# Patient Record
Sex: FEMALE | Race: WHITE | NOT HISPANIC OR LATINO
[De-identification: names, ages, dates, MRNs, and addresses within clinical notes are randomized per-mention and may not be internally consistent; named-entity substitution may affect disease eponyms.]

---

## 2021-11-11 ENCOUNTER — NON-APPOINTMENT (OUTPATIENT)
Age: 51
End: 2021-11-11

## 2021-11-15 ENCOUNTER — APPOINTMENT (OUTPATIENT)
Dept: BREAST CENTER | Facility: CLINIC | Age: 51
End: 2021-11-15
Payer: MEDICAID

## 2021-11-15 ENCOUNTER — NON-APPOINTMENT (OUTPATIENT)
Age: 51
End: 2021-11-15

## 2021-11-15 DIAGNOSIS — Z86.59 PERSONAL HISTORY OF OTHER MENTAL AND BEHAVIORAL DISORDERS: ICD-10-CM

## 2021-11-15 DIAGNOSIS — Z83.3 FAMILY HISTORY OF DIABETES MELLITUS: ICD-10-CM

## 2021-11-15 DIAGNOSIS — Z78.9 OTHER SPECIFIED HEALTH STATUS: ICD-10-CM

## 2021-11-15 DIAGNOSIS — Z82.49 FAMILY HISTORY OF ISCHEMIC HEART DISEASE AND OTHER DISEASES OF THE CIRCULATORY SYSTEM: ICD-10-CM

## 2021-11-15 PROCEDURE — 99203 OFFICE O/P NEW LOW 30 MIN: CPT

## 2021-11-15 RX ORDER — ALPRAZOLAM 0.5 MG/1
0.5 TABLET ORAL
Qty: 30 | Refills: 0 | Status: ACTIVE | COMMUNITY
Start: 2021-10-15

## 2021-11-15 NOTE — PHYSICAL EXAM
[Normocephalic] : normocephalic [Atraumatic] : atraumatic [Supple] : supple [No Supraclavicular Adenopathy] : no supraclavicular adenopathy [Examined in the supine and seated position] : examined in the supine and seated position [Bra Size: ___] : Bra Size: [unfilled] [No dominant masses] : no dominant masses in right breast  [No Nipple Retraction] : no left nipple retraction [No Nipple Discharge] : no left nipple discharge [No Axillary Lymphadenopathy] : no right axillary lymphadenopathy [No Edema] : no edema [No Rashes] : no rashes [No Ulceration] : no ulceration [de-identified] : bilateral implants intact. left axillary tail with 4cm soft vague mass. left axilla with 2cm soft palpable node

## 2021-11-15 NOTE — DATA REVIEWED
[FreeTextEntry1] : 11/5/21 (R) b/l dx mmg and US: heterogeneously dense. Corresponding to the patient's palpable finding is a 3.9 cm mass in the left breast 1-2 o'clock which is suspicious for malignancy. This would correspond to the area of architectural distortion seen on mammography. Recommend ultrasound-guided core biopsy and clip placement. BI-RADS 4.\par \par 11/9/21 (R/[facility] path) US bx:1. Left breast 1:00-2:00 5cm fn, core biopsy:  Invasive moderately differentiated mammary carcinoma with associated calcifications - Focal mammary carcinoma in situ. IHC: ESTROGEN RECEPTOR: POSITIVE 45% NUCLEAR STAINING WITH WEAK/MODERATE 1+/2+ INTENSITY PROGESTERONE RECEPTOR: POSITIVE 30% NUCLEAR STAINING WITH MODERATE 2+ INTENSITY HER-2: NEGATIVE (0 MEMBRANOUS STAINING) \par

## 2021-11-15 NOTE — PAST MEDICAL HISTORY
[Menstruating] : The patient is menstruating [Menarche Age ____] : age at menarche was [unfilled] [Definite ___ (Date)] : the last menstrual period was [unfilled] [Total Preg ___] : G[unfilled] [Living ___] : Living: [unfilled] [History of Hormone Replacement Treatment] : has no history of hormone replacement treatment [FreeTextEntry5] : No [FreeTextEntry6] : No [FreeTextEntry7] : No [FreeTextEntry8] : No

## 2021-11-15 NOTE — HISTORY OF PRESENT ILLNESS
[FreeTextEntry1] : 52 yo female referred by Dr. Figueroa at [facility] presents for newly diagnosed left breast invasive mammary carcinoma s/ focal mammary carcinoma in situ (ER+ UT+ HER2 negative) found on breast US as a 3.9cm mass that was felt on palpation by the patient; she states that the mass has been present for many years. Patient's last mammogram was in 2013, due to multiple personal issues. Denies nipple discharge, skin changes, fevers, chills. \par \par Of note the patient has b/l implants that were placed >10 years ago\par \par Discussed patient's new diagnosis of cancer. Discussed local therapy which would include breast and axillary surgery, depending on results of MRI. Discussed the possibility of radiation therapy if breast conservation is pursued. Discussed option for mastectomy and contralateral prophylactic mastectomy depending on MRI and genetic testing results. Discussed need for plastic surgery consult in regards to the implant. Also discussed systemic therapy including endocrine therapy as her tumor is ER+, and chemotherapy depending on the Ki67 (which is pending), final pathology and medical oncology recommendations. Patient expressed understanding and agrees with the plan.\par \par Discussed importance and implication of genetic testing in regards to her new cancer diagnosis and pre op planning. Patient would like to go forward with genetic testing.\par \par Discussed benefits of surveillance and well as implication of the sensitivity of breast MRI. Patient understands and agrees to go forward with MRI for pre op planning.

## 2021-11-23 DIAGNOSIS — R92.8 OTHER ABNORMAL AND INCONCLUSIVE FINDINGS ON DIAGNOSTIC IMAGING OF BREAST: ICD-10-CM

## 2021-11-29 ENCOUNTER — APPOINTMENT (OUTPATIENT)
Dept: BREAST CENTER | Facility: CLINIC | Age: 51
End: 2021-11-29
Payer: MEDICAID

## 2021-11-29 PROCEDURE — 99213 OFFICE O/P EST LOW 20 MIN: CPT | Mod: 95

## 2021-12-01 NOTE — DATA REVIEWED
[FreeTextEntry1] : 11/5/21 ([facility]) b/l dx mmg and US: heterogeneously dense. Corresponding to the patient's palpable finding is a 3.9 cm mass in the left breast 1-2 o'clock which is suspicious for malignancy. This would correspond to the area of architectural distortion seen on mammography. Recommend ultrasound-guided core biopsy and clip placement. BI-RADS 4.\par \par 11/9/21 (R/[facility] path) US bx:1. Left breast 1:00-2:00 5cm fn, core biopsy:  Invasive moderately differentiated mammary carcinoma with associated calcifications - Focal mammary carcinoma in situ. IHC: ESTROGEN RECEPTOR: POSITIVE 45% NUCLEAR STAINING WITH WEAK/MODERATE 1+/2+ INTENSITY PROGESTERONE RECEPTOR: POSITIVE 30% NUCLEAR STAINING WITH MODERATE 2+ INTENSITY HER-2: NEGATIVE (0 MEMBRANOUS STAINING) \par \par 11/23/21 ([facility]) MRI: 1. MR guided core biopsy or clip placement for the right 10:00 axis mass enhancement. 2. Appropriate action for the large left breast cancer.  3. Targeted left axillary ultrasound and possible ultrasound core biopsy based on a palpable abnormality in the left axilla by the referring clinician, which is likely not included on this study. BI-RADS 4. \par

## 2021-12-01 NOTE — REASON FOR VISIT
[Consultation] : a consultation visit [Home] : at home, [unfilled] , at the time of the visit. [Medical Office: ([facility])___] : at the medical office located in  [Verbal consent obtained from patient] : the patient, [unfilled] [Friend] : friend

## 2021-12-01 NOTE — HISTORY OF PRESENT ILLNESS
[FreeTextEntry1] : 52 yo female referred by Dr. Figueroa at [facility] presents for newly diagnosed left breast invasive mammary carcinoma s/ focal mammary carcinoma in situ (ER+ OH+ HER2 negative) found on breast US as a 3.9cm mass that was felt on palpation by the patient; she states that the mass has been present for many years. Patient's last mammogram was in 2013, due to multiple personal issues. Denies nipple discharge, skin changes, fevers, chills. \par \par Of note the patient has b/l implants that were placed >10 years ago\par \par Discussed patient's MRI results which show suspicious enhancement on right breast which is too close to the implant for a biopsy, therefore will have clip placed and will excise with the cancer surgery. Discussed recommendation for mastectomy due to the size of the mass on MRI. Discussed recommendation to meet with medical oncology due to the Ki67% of 35%. Discussed the possibility of systemic therapy prior to surgery. Discussed recommendation for plastic surgery consult. Patient and friend understood and agree with plan.

## 2021-12-06 ENCOUNTER — APPOINTMENT (OUTPATIENT)
Dept: PLASTIC SURGERY | Facility: CLINIC | Age: 51
End: 2021-12-06
Payer: MEDICAID

## 2021-12-06 VITALS — HEIGHT: 71.5 IN | WEIGHT: 136 LBS | BODY MASS INDEX: 18.62 KG/M2 | HEART RATE: 85 BPM | OXYGEN SATURATION: 98 %

## 2021-12-06 PROCEDURE — 99204 OFFICE O/P NEW MOD 45 MIN: CPT

## 2021-12-07 NOTE — HISTORY OF PRESENT ILLNESS
[FreeTextEntry1] : 52 y/o female newly diagnosed left breast invasive carcinoma referred by Dr. Elise presents for initial consultation to discuss options for breast reconstruction. She is unsure if she will be having mastectomy vs lumpectomy. She had a cosmetic breast augmentation with saline implants in 2003. She is currently a 31 D. \par Breasts: b/l retropectoral implants in place, well healed periareolar incisions, no nipple inversion, no nipple discharge. BD: 14-15 cm, projection: 4 cm \par Abdomen: inadequate tissue for autologous reconstruction\par \par Today we discussed all options for breast reconstruction including oncoplastic reconstruction for lumpectomy vs breast reconstruction with tissue expanders vs direct to implant exchange. The nature of each surgery, its risks, benefits, alternatives, expected postoperative course, recovery and long term results were discussed in detail. All questions were answered.\par Will coordinate surgery date with Dr. Elise.\par

## 2021-12-14 ENCOUNTER — LABORATORY RESULT (OUTPATIENT)
Age: 51
End: 2021-12-14

## 2021-12-14 ENCOUNTER — TRANSCRIPTION ENCOUNTER (OUTPATIENT)
Age: 51
End: 2021-12-14

## 2021-12-15 ENCOUNTER — NON-APPOINTMENT (OUTPATIENT)
Age: 51
End: 2021-12-15

## 2021-12-15 ENCOUNTER — APPOINTMENT (OUTPATIENT)
Dept: HEMATOLOGY ONCOLOGY | Facility: CLINIC | Age: 51
End: 2021-12-15
Payer: MEDICAID

## 2021-12-15 VITALS
DIASTOLIC BLOOD PRESSURE: 88 MMHG | WEIGHT: 134 LBS | BODY MASS INDEX: 18.76 KG/M2 | SYSTOLIC BLOOD PRESSURE: 136 MMHG | HEART RATE: 100 BPM | TEMPERATURE: 98 F | OXYGEN SATURATION: 97 % | HEIGHT: 71 IN | RESPIRATION RATE: 18 BRPM

## 2021-12-15 DIAGNOSIS — Z78.9 OTHER SPECIFIED HEALTH STATUS: ICD-10-CM

## 2021-12-15 DIAGNOSIS — R59.9 ENLARGED LYMPH NODES, UNSPECIFIED: ICD-10-CM

## 2021-12-15 PROCEDURE — 99205 OFFICE O/P NEW HI 60 MIN: CPT

## 2021-12-16 LAB
25(OH)D3 SERPL-MCNC: 28.2 NG/ML
BASOPHILS # BLD AUTO: 0.05 K/UL
BASOPHILS NFR BLD AUTO: 0.9 %
CANCER AG27-29 SERPL-ACNC: 22 U/ML
CEA SERPL-MCNC: 1.2 NG/ML
EOSINOPHIL # BLD AUTO: 0.08 K/UL
EOSINOPHIL NFR BLD AUTO: 1.4 %
ESTRADIOL SERPL-MCNC: 216 PG/ML
FSH SERPL-MCNC: 9.4 IU/L
HCT VFR BLD CALC: 45.7 %
HGB BLD-MCNC: 14.7 G/DL
IMM GRANULOCYTES NFR BLD AUTO: 0.4 %
LH SERPL-ACNC: 18 IU/L
LYMPHOCYTES # BLD AUTO: 0.61 K/UL
LYMPHOCYTES NFR BLD AUTO: 10.8 %
MAN DIFF?: NORMAL
MCHC RBC-ENTMCNC: 32.2 GM/DL
MCHC RBC-ENTMCNC: 33.3 PG
MCV RBC AUTO: 103.6 FL
MONOCYTES # BLD AUTO: 0.72 K/UL
MONOCYTES NFR BLD AUTO: 12.7 %
NEUTROPHILS # BLD AUTO: 4.18 K/UL
NEUTROPHILS NFR BLD AUTO: 73.8 %
PLATELET # BLD AUTO: 279 K/UL
RBC # BLD: 4.41 M/UL
RBC # FLD: 12.6 %
VIT B12 SERPL-MCNC: 619 PG/ML
WBC # FLD AUTO: 5.66 K/UL

## 2021-12-20 ENCOUNTER — TRANSCRIPTION ENCOUNTER (OUTPATIENT)
Age: 51
End: 2021-12-20

## 2021-12-29 PROBLEM — R59.9 PALPABLE LYMPH NODE: Status: ACTIVE | Noted: 2021-11-15

## 2021-12-29 PROBLEM — Z78.9: Status: ACTIVE | Noted: 2021-12-15
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
## 2021-12-29 NOTE — ASSESSMENT
[FreeTextEntry1] : 50 yo premenopausal Polish woman ( fluent in English and Polish) referred by Dr. Elise for evaluation of left breast cancer.   Biopsy revealed IDC ESTROGEN RECEPTOR: POSITIVE 45% NUCLEAR STAINING WITH WEAK/MODERATE 1+/2+ INTENSITY PROGESTERONE RECEPTOR: POSITIVE 30% NUCLEAR STAINING WITH MODERATE 2+ INTENSITY HER-2: NEGATIVE (0 MEMBRANOUS STAINING) KI 67 35 %.\par \par Imaging studies with left breast mass in US 3.9 x 1.1 x 1.2 cm, MRI max dimension 5.3 cm.\par Right breast lesion - biopsy unsuccessful.\par \par Patient decided on bilateral mastectomy.  She understand indication for systemic chemotherapy based on lymph node status and molecular testing. 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 Reviewed protocols of chemotherapy including ACdd> Tweekly, TC and CMF with the last one with lowest incidence of hair loss.\par Reviewed options for scalp cooling to decrease hair loss. \par Reviewed with patient indication for radiation therapy if tumor over 5 cm or LN positive.\par Plan for endocrine treatment with or without CDK 4/6 based on the final pathology.\par \par

## 2021-12-29 NOTE — PHYSICAL EXAM
[Fully active, able to carry on all pre-disease performance without restriction] : Status 0 - Fully active, able to carry on all pre-disease performance without restriction [Normal] : affect appropriate [de-identified] : b/l retropec implants, soft left axilla LN

## 2022-01-06 ENCOUNTER — TRANSCRIPTION ENCOUNTER (OUTPATIENT)
Age: 52
End: 2022-01-06

## 2022-01-06 ENCOUNTER — APPOINTMENT (OUTPATIENT)
Dept: PLASTIC SURGERY | Facility: CLINIC | Age: 52
End: 2022-01-06
Payer: MEDICAID

## 2022-01-06 PROCEDURE — 99442: CPT

## 2022-01-06 NOTE — HISTORY OF PRESENT ILLNESS
[Home] : at home, [unfilled] , at the time of the visit. [Medical Office: ([facility])___] : at the medical office located in  [Verbal consent obtained from patient] : the patient, [unfilled] [FreeTextEntry1] : Patient Name: MARY REYES \par : 1970 \par Date: 2022 \par Attending: Dr. Jose R Barry\par \par HPI: preop consultation for bilateral tissue expander breast reconstruction 21.\par \par Allergies: NKDA\par Medication prescribed: valium 5 mg, percocet 5-325 mg\par \par ROS: complete 14 point review of systems negative except pertinent items reviewed in the HPI. Other non-contributory items reviewed in our new patient questionnaire and we have submitted it to be scanned into the medical record. \par \par Physical Exam: \par completed at time of in office evaluation \par \par Assessment/Plan:\par We have discussed pre and postop instructions, recovery limitations, restrictions and expectations, ERAS protocol, NPO status, transportation home, postop medications, COVID-19 policies, benefits and risks of the procedure. Pre-op labs reviewed. The patient would like to proceed with surgery as scheduled.\par \par JERO MARCOS NP\par \par

## 2022-01-10 ENCOUNTER — LABORATORY RESULT (OUTPATIENT)
Age: 52
End: 2022-01-10

## 2022-01-12 ENCOUNTER — NON-APPOINTMENT (OUTPATIENT)
Age: 52
End: 2022-01-12

## 2022-01-19 ENCOUNTER — APPOINTMENT (OUTPATIENT)
Dept: HEMATOLOGY ONCOLOGY | Facility: CLINIC | Age: 52
End: 2022-01-19
Payer: MEDICAID

## 2022-01-26 ENCOUNTER — LABORATORY RESULT (OUTPATIENT)
Age: 52
End: 2022-01-26

## 2022-01-26 ENCOUNTER — APPOINTMENT (OUTPATIENT)
Dept: HEMATOLOGY ONCOLOGY | Facility: CLINIC | Age: 52
End: 2022-01-26
Payer: MEDICAID

## 2022-01-26 ENCOUNTER — OUTPATIENT (OUTPATIENT)
Dept: OUTPATIENT SERVICES | Facility: HOSPITAL | Age: 52
LOS: 1 days | End: 2022-01-26
Payer: COMMERCIAL

## 2022-01-26 ENCOUNTER — APPOINTMENT (OUTPATIENT)
Age: 52
End: 2022-01-26

## 2022-01-26 VITALS
BODY MASS INDEX: 19.04 KG/M2 | TEMPERATURE: 97.7 F | HEART RATE: 77 BPM | DIASTOLIC BLOOD PRESSURE: 79 MMHG | SYSTOLIC BLOOD PRESSURE: 130 MMHG | WEIGHT: 136 LBS | RESPIRATION RATE: 18 BRPM | HEIGHT: 71 IN | OXYGEN SATURATION: 100 %

## 2022-01-26 DIAGNOSIS — C50.919 MALIGNANT NEOPLASM OF UNSPECIFIED SITE OF UNSPECIFIED FEMALE BREAST: ICD-10-CM

## 2022-01-26 PROCEDURE — 99214 OFFICE O/P EST MOD 30 MIN: CPT | Mod: 25

## 2022-01-26 PROCEDURE — 96372 THER/PROPH/DIAG INJ SC/IM: CPT

## 2022-01-26 RX ORDER — BUPROPION HYDROCHLORIDE 200 MG/1
200 TABLET, FILM COATED, EXTENDED RELEASE ORAL
Qty: 30 | Refills: 0 | Status: DISCONTINUED | COMMUNITY
Start: 2021-11-01 | End: 2022-01-26

## 2022-01-26 RX ORDER — BUPROPION HYDROCHLORIDE 300 MG/1
300 TABLET, EXTENDED RELEASE ORAL
Qty: 30 | Refills: 0 | Status: DISCONTINUED | COMMUNITY
Start: 2021-11-11 | End: 2022-01-26

## 2022-01-26 RX ORDER — GOSERELIN ACETATE 10.8 MG/1
3.6 IMPLANT SUBCUTANEOUS ONCE
Refills: 0 | Status: COMPLETED | OUTPATIENT
Start: 2022-01-26 | End: 2022-01-26

## 2022-01-26 RX ORDER — BUSPIRONE HYDROCHLORIDE 30 MG/1
30 TABLET ORAL
Qty: 60 | Refills: 0 | Status: DISCONTINUED | COMMUNITY
Start: 2021-11-11 | End: 2022-01-26

## 2022-01-26 RX ADMIN — GOSERELIN ACETATE 3.6 MILLIGRAM(S): 10.8 IMPLANT SUBCUTANEOUS at 16:24

## 2022-01-26 NOTE — ASSESSMENT
[FreeTextEntry1] : 50 yo premenopausal Polish woman ( fluent in English and Polish) referred by Dr. Elise for evaluation of left breast cancer.   Biopsy revealed IDC ESTROGEN RECEPTOR: POSITIVE 45% NUCLEAR STAINING WITH WEAK/MODERATE 1+/2+ INTENSITY PROGESTERONE RECEPTOR: POSITIVE 30% NUCLEAR STAINING WITH MODERATE 2+ INTENSITY HER-2: NEGATIVE (0 MEMBRANOUS STAINING) KI 67 35 %.\par \par Imaging studies with left breast mass in US 3.9 x 1.1 x 1.2 cm, MRI max dimension 5.3 cm.\par Right breast lesion - biopsy unsuccessful.\par \par Patient decided on bilateral mastectomy.  She understand indication for systemic chemotherapy based on lymph node status and molecular testing. 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 Reviewed protocols of chemotherapy including ACdd> Tweekly, TC and CMF with the last one with lowest incidence of hair loss.\par Reviewed options for scalp cooling to decrease hair loss. \par Reviewed with patient indication for radiation therapy if tumor over 5 cm or LN positive.\par Plan for endocrine treatment with or without CDK 4/6 based on the final pathology.\par \par Patient scheduled for bilateral mastectomy next week.\par Mammaprint results c/w low recurrence score, reviewed with patient if LN positive given the fact that she is premenopausal she will have indication for adjuvant chemotherapy. If LN negative plan for AI. \par Zoladex 3.6 mg today, reviewed side effects.
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
## 2022-01-26 NOTE — DISCUSSION/SUMMARY
[FreeTextEntry1] : Pt with slightly elevated LFTS's- she states when she was diagnosed she increased her wine consuption however has stopped last week.  Will obtain abdominal US and repeat labs in 2-3 weeks

## 2022-01-27 LAB
CANCER AG27-29 SERPL-ACNC: 20.8 U/ML
CEA SERPL-MCNC: 0.9 NG/ML
CRP SERPL-MCNC: <3 MG/L
ERYTHROCYTE [SEDIMENTATION RATE] IN BLOOD BY WESTERGREN METHOD: 16 MM/HR
LDH SERPL-CCNC: 209 U/L
MAGNESIUM SERPL-MCNC: 1.9 MG/DL
PHOSPHATE SERPL-MCNC: 3.4 MG/DL
URATE SERPL-MCNC: 4.7 MG/DL

## 2022-01-31 ENCOUNTER — TRANSCRIPTION ENCOUNTER (OUTPATIENT)
Age: 52
End: 2022-01-31

## 2022-01-31 ENCOUNTER — OUTPATIENT (OUTPATIENT)
Dept: OUTPATIENT SERVICES | Facility: HOSPITAL | Age: 52
LOS: 1 days | End: 2022-01-31
Payer: COMMERCIAL

## 2022-01-31 DIAGNOSIS — N88.8 OTHER SPECIFIED NONINFLAMMATORY DISORDERS OF CERVIX UTERI: Chronic | ICD-10-CM

## 2022-01-31 DIAGNOSIS — Z98.82 BREAST IMPLANT STATUS: Chronic | ICD-10-CM

## 2022-01-31 DIAGNOSIS — Z86.018 PERSONAL HISTORY OF OTHER BENIGN NEOPLASM: Chronic | ICD-10-CM

## 2022-01-31 PROCEDURE — 78195 LYMPH SYSTEM IMAGING: CPT

## 2022-01-31 PROCEDURE — 78195 LYMPH SYSTEM IMAGING: CPT | Mod: 26

## 2022-01-31 PROCEDURE — A9541: CPT

## 2022-01-31 NOTE — ASU PATIENT PROFILE, ADULT - FALL HARM RISK - UNIVERSAL INTERVENTIONS
Bed in lowest position, wheels locked, appropriate side rails in place/Call bell, personal items and telephone in reach/Instruct patient to call for assistance before getting out of bed or chair/Non-slip footwear when patient is out of bed/[place] to call system/Physically safe environment - no spills, clutter or unnecessary equipment/Purposeful Proactive Rounding/Room/bathroom lighting operational, light cord in reach

## 2022-01-31 NOTE — ASU PATIENT PROFILE, ADULT - NSICDXPASTSURGICALHX_GEN_ALL_CORE_FT
PAST SURGICAL HISTORY:  Cervical mass removal    H/O breast implant     History of benign breast tumor removal     PAST SURGICAL HISTORY:  Cervical mass removal    H/O breast implant     History of benign breast tumor removal right
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
## 2022-02-01 ENCOUNTER — OUTPATIENT (OUTPATIENT)
Dept: OUTPATIENT SERVICES | Facility: HOSPITAL | Age: 52
LOS: 1 days | Discharge: HOME | End: 2022-02-01
Payer: COMMERCIAL

## 2022-02-01 ENCOUNTER — RESULT REVIEW (OUTPATIENT)
Age: 52
End: 2022-02-01

## 2022-02-01 ENCOUNTER — APPOINTMENT (OUTPATIENT)
Dept: BREAST CENTER | Facility: CLINIC | Age: 52
End: 2022-02-01

## 2022-02-01 VITALS
SYSTOLIC BLOOD PRESSURE: 121 MMHG | HEART RATE: 83 BPM | OXYGEN SATURATION: 98 % | WEIGHT: 137.35 LBS | TEMPERATURE: 98 F | RESPIRATION RATE: 18 BRPM | HEIGHT: 71 IN | DIASTOLIC BLOOD PRESSURE: 81 MMHG

## 2022-02-01 DIAGNOSIS — Z86.018 PERSONAL HISTORY OF OTHER BENIGN NEOPLASM: Chronic | ICD-10-CM

## 2022-02-01 DIAGNOSIS — N88.8 OTHER SPECIFIED NONINFLAMMATORY DISORDERS OF CERVIX UTERI: Chronic | ICD-10-CM

## 2022-02-01 DIAGNOSIS — Z98.82 BREAST IMPLANT STATUS: Chronic | ICD-10-CM

## 2022-02-01 LAB
ANION GAP SERPL CALC-SCNC: 14 MMOL/L — SIGNIFICANT CHANGE UP (ref 5–17)
BASE EXCESS BLDA CALC-SCNC: -2.1 MMOL/L — LOW (ref -2–3)
BASE EXCESS BLDA CALC-SCNC: -3 MMOL/L — LOW (ref -2–3)
BUN SERPL-MCNC: 10 MG/DL — SIGNIFICANT CHANGE UP (ref 7–23)
CA-I BLDA-SCNC: 1.17 MMOL/L — SIGNIFICANT CHANGE UP (ref 1.15–1.33)
CALCIUM SERPL-MCNC: 8.4 MG/DL — SIGNIFICANT CHANGE UP (ref 8.4–10.5)
CHLORIDE SERPL-SCNC: 108 MMOL/L — SIGNIFICANT CHANGE UP (ref 96–108)
CO2 BLDA-SCNC: 22 MMOL/L — SIGNIFICANT CHANGE UP (ref 19–24)
CO2 BLDA-SCNC: 24 MMOL/L — SIGNIFICANT CHANGE UP (ref 19–24)
CO2 SERPL-SCNC: 18 MMOL/L — LOW (ref 22–31)
COHGB MFR BLDA: 0 % — SIGNIFICANT CHANGE UP
CREAT SERPL-MCNC: 0.49 MG/DL — LOW (ref 0.5–1.3)
GLUCOSE BLDA-MCNC: 212 MG/DL — HIGH (ref 70–99)
GLUCOSE BLDC GLUCOMTR-MCNC: 193 MG/DL — HIGH (ref 70–99)
GLUCOSE SERPL-MCNC: 202 MG/DL — HIGH (ref 70–99)
HCO3 BLDA-SCNC: 21 MMOL/L — SIGNIFICANT CHANGE UP (ref 21–28)
HCO3 BLDA-SCNC: 23 MMOL/L — SIGNIFICANT CHANGE UP (ref 21–28)
HCT VFR BLD CALC: 35 % — SIGNIFICANT CHANGE UP (ref 34.5–45)
HGB BLD-MCNC: 11.9 G/DL — SIGNIFICANT CHANGE UP (ref 11.5–15.5)
HGB BLDA-MCNC: 13.3 G/DL — SIGNIFICANT CHANGE UP (ref 11.7–16.1)
ISTAT ARTERIAL BE: -1 MMOL/L — SIGNIFICANT CHANGE UP (ref -2–3)
ISTAT ARTERIAL GLUCOSE: 142 MG/DL — HIGH (ref 70–99)
ISTAT ARTERIAL HCO3: 24 MMOL/L — SIGNIFICANT CHANGE UP (ref 22–26)
ISTAT ARTERIAL HEMATOCRIT: 37 % — SIGNIFICANT CHANGE UP (ref 34.5–45)
ISTAT ARTERIAL HEMOGLOBIN: 12.6 G/DL — SIGNIFICANT CHANGE UP (ref 11.5–15.5)
ISTAT ARTERIAL IONIZED CALCIUM: 1.15 MMOL/L — SIGNIFICANT CHANGE UP (ref 1.12–1.3)
ISTAT ARTERIAL PCO2: 44 MMHG — SIGNIFICANT CHANGE UP (ref 35–45)
ISTAT ARTERIAL PH: 7.35 — SIGNIFICANT CHANGE UP (ref 7.35–7.45)
ISTAT ARTERIAL PO2: 429 MMHG — HIGH (ref 80–105)
ISTAT ARTERIAL POTASSIUM: 2.7 MMOL/L — CRITICAL LOW (ref 3.5–5.3)
ISTAT ARTERIAL SO2: 100 % — HIGH (ref 95–98)
ISTAT ARTERIAL SODIUM: 140 MMOL/L — SIGNIFICANT CHANGE UP (ref 135–145)
ISTAT ARTERIAL TCO2: 26 MMOL/L — SIGNIFICANT CHANGE UP (ref 22–31)
LACTATE SERPL-SCNC: 2.2 MMOL/L — HIGH (ref 0.5–2)
MAGNESIUM SERPL-MCNC: 1.1 MG/DL — LOW (ref 1.6–2.6)
MCHC RBC-ENTMCNC: 32.1 PG — SIGNIFICANT CHANGE UP (ref 27–34)
MCHC RBC-ENTMCNC: 34 GM/DL — SIGNIFICANT CHANGE UP (ref 32–36)
MCV RBC AUTO: 94.3 FL — SIGNIFICANT CHANGE UP (ref 80–100)
METHGB MFR BLDA: 1.2 % — SIGNIFICANT CHANGE UP
NRBC # BLD: 0 /100 WBCS — SIGNIFICANT CHANGE UP (ref 0–0)
OXYHGB MFR BLDA: 96.6 % — HIGH (ref 90–95)
PCO2 BLDA: 29 MMHG — LOW (ref 32–35)
PCO2 BLDA: 42 MMHG — HIGH (ref 32–35)
PH BLDA: 7.34 — LOW (ref 7.35–7.45)
PH BLDA: 7.46 — HIGH (ref 7.35–7.45)
PHOSPHATE SERPL-MCNC: 2.7 MG/DL — SIGNIFICANT CHANGE UP (ref 2.5–4.5)
PLATELET # BLD AUTO: 191 K/UL — SIGNIFICANT CHANGE UP (ref 150–400)
PO2 BLDA: 215 MMHG — HIGH (ref 83–108)
PO2 BLDA: 399 MMHG — HIGH (ref 83–108)
POTASSIUM BLDA-SCNC: 2.9 MMOL/L — CRITICAL LOW (ref 3.5–5.1)
POTASSIUM SERPL-MCNC: 3.6 MMOL/L — SIGNIFICANT CHANGE UP (ref 3.5–5.3)
POTASSIUM SERPL-SCNC: 3.6 MMOL/L — SIGNIFICANT CHANGE UP (ref 3.5–5.3)
RBC # BLD: 3.71 M/UL — LOW (ref 3.8–5.2)
RBC # FLD: 11.7 % — SIGNIFICANT CHANGE UP (ref 10.3–14.5)
SAO2 % BLDA: 97.8 % — SIGNIFICANT CHANGE UP (ref 94–98)
SAO2 % BLDA: 97.9 % — SIGNIFICANT CHANGE UP (ref 94–98)
SODIUM BLDA-SCNC: 137 MMOL/L — SIGNIFICANT CHANGE UP (ref 136–145)
SODIUM SERPL-SCNC: 140 MMOL/L — SIGNIFICANT CHANGE UP (ref 135–145)
WBC # BLD: 15.83 K/UL — HIGH (ref 3.8–10.5)
WBC # FLD AUTO: 15.83 K/UL — HIGH (ref 3.8–10.5)

## 2022-02-01 PROCEDURE — 88305 TISSUE EXAM BY PATHOLOGIST: CPT | Mod: 26

## 2022-02-01 PROCEDURE — 99291 CRITICAL CARE FIRST HOUR: CPT

## 2022-02-01 PROCEDURE — 71045 X-RAY EXAM CHEST 1 VIEW: CPT | Mod: 26

## 2022-02-01 RX ORDER — FENTANYL CITRATE 50 UG/ML
40 INJECTION INTRAVENOUS ONCE
Refills: 0 | Status: DISCONTINUED | OUTPATIENT
Start: 2022-02-01 | End: 2022-02-01

## 2022-02-01 RX ORDER — SODIUM CHLORIDE 9 MG/ML
1000 INJECTION, SOLUTION INTRAVENOUS
Refills: 0 | Status: DISCONTINUED | OUTPATIENT
Start: 2022-02-01 | End: 2022-02-01

## 2022-02-01 RX ORDER — SODIUM CHLORIDE 9 MG/ML
1000 INJECTION, SOLUTION INTRAVENOUS
Refills: 0 | Status: DISCONTINUED | OUTPATIENT
Start: 2022-02-01 | End: 2022-02-03

## 2022-02-01 RX ORDER — DEXTROSE 50 % IN WATER 50 %
25 SYRINGE (ML) INTRAVENOUS ONCE
Refills: 0 | Status: DISCONTINUED | OUTPATIENT
Start: 2022-02-01 | End: 2022-02-03

## 2022-02-01 RX ORDER — SODIUM CHLORIDE 9 MG/ML
1000 INJECTION, SOLUTION INTRAVENOUS
Refills: 0 | Status: DISCONTINUED | OUTPATIENT
Start: 2022-02-01 | End: 2022-02-02

## 2022-02-01 RX ORDER — DEXTROSE 50 % IN WATER 50 %
15 SYRINGE (ML) INTRAVENOUS ONCE
Refills: 0 | Status: DISCONTINUED | OUTPATIENT
Start: 2022-02-01 | End: 2022-02-01

## 2022-02-01 RX ORDER — EPINEPHRINE 0.3 MG/.3ML
0.05 INJECTION INTRAMUSCULAR; SUBCUTANEOUS
Qty: 4 | Refills: 0 | Status: DISCONTINUED | OUTPATIENT
Start: 2022-02-01 | End: 2022-02-02

## 2022-02-01 RX ORDER — DIPHENHYDRAMINE HCL 50 MG
25 CAPSULE ORAL EVERY 6 HOURS
Refills: 0 | Status: DISCONTINUED | OUTPATIENT
Start: 2022-02-01 | End: 2022-02-02

## 2022-02-01 RX ORDER — DIPHENHYDRAMINE HCL 50 MG
25 CAPSULE ORAL EVERY 6 HOURS
Refills: 0 | Status: DISCONTINUED | OUTPATIENT
Start: 2022-02-01 | End: 2022-02-01

## 2022-02-01 RX ORDER — POTASSIUM CHLORIDE 20 MEQ
10 PACKET (EA) ORAL
Refills: 0 | Status: COMPLETED | OUTPATIENT
Start: 2022-02-01 | End: 2022-02-01

## 2022-02-01 RX ORDER — SODIUM CHLORIDE 9 MG/ML
1000 INJECTION, SOLUTION INTRAVENOUS ONCE
Refills: 0 | Status: COMPLETED | OUTPATIENT
Start: 2022-02-01 | End: 2022-02-01

## 2022-02-01 RX ORDER — FENTANYL CITRATE 50 UG/ML
0.5 INJECTION INTRAVENOUS ONCE
Qty: 2500 | Refills: 0 | Status: DISCONTINUED | OUTPATIENT
Start: 2022-02-01 | End: 2022-02-01

## 2022-02-01 RX ORDER — INSULIN LISPRO 100/ML
VIAL (ML) SUBCUTANEOUS ONCE
Refills: 0 | Status: DISCONTINUED | OUTPATIENT
Start: 2022-02-01 | End: 2022-02-01

## 2022-02-01 RX ORDER — FENTANYL CITRATE 50 UG/ML
0.5 INJECTION INTRAVENOUS
Qty: 2500 | Refills: 0 | Status: DISCONTINUED | OUTPATIENT
Start: 2022-02-01 | End: 2022-02-01

## 2022-02-01 RX ORDER — CHLORHEXIDINE GLUCONATE 213 G/1000ML
15 SOLUTION TOPICAL EVERY 12 HOURS
Refills: 0 | Status: DISCONTINUED | OUTPATIENT
Start: 2022-02-01 | End: 2022-02-02

## 2022-02-01 RX ORDER — INSULIN LISPRO 100/ML
VIAL (ML) SUBCUTANEOUS EVERY 6 HOURS
Refills: 0 | Status: DISCONTINUED | OUTPATIENT
Start: 2022-02-01 | End: 2022-02-03

## 2022-02-01 RX ORDER — BUPIVACAINE 13.3 MG/ML
20 INJECTION, SUSPENSION, LIPOSOMAL INFILTRATION ONCE
Refills: 0 | Status: DISCONTINUED | OUTPATIENT
Start: 2022-02-01 | End: 2022-02-03

## 2022-02-01 RX ORDER — DEXTROSE 50 % IN WATER 50 %
15 SYRINGE (ML) INTRAVENOUS ONCE
Refills: 0 | Status: DISCONTINUED | OUTPATIENT
Start: 2022-02-01 | End: 2022-02-03

## 2022-02-01 RX ORDER — GLUCAGON INJECTION, SOLUTION 0.5 MG/.1ML
1 INJECTION, SOLUTION SUBCUTANEOUS ONCE
Refills: 0 | Status: DISCONTINUED | OUTPATIENT
Start: 2022-02-01 | End: 2022-02-03

## 2022-02-01 RX ORDER — CHLORHEXIDINE GLUCONATE 213 G/1000ML
1 SOLUTION TOPICAL ONCE
Refills: 0 | Status: COMPLETED | OUTPATIENT
Start: 2022-02-01 | End: 2022-02-02

## 2022-02-01 RX ORDER — PANTOPRAZOLE SODIUM 20 MG/1
40 TABLET, DELAYED RELEASE ORAL ONCE
Refills: 0 | Status: COMPLETED | OUTPATIENT
Start: 2022-02-01 | End: 2022-02-01

## 2022-02-01 RX ORDER — FENTANYL CITRATE 50 UG/ML
50 INJECTION INTRAVENOUS ONCE
Refills: 0 | Status: DISCONTINUED | OUTPATIENT
Start: 2022-02-01 | End: 2022-02-01

## 2022-02-01 RX ORDER — DEXTROSE 50 % IN WATER 50 %
25 SYRINGE (ML) INTRAVENOUS ONCE
Refills: 0 | Status: DISCONTINUED | OUTPATIENT
Start: 2022-02-01 | End: 2022-02-01

## 2022-02-01 RX ORDER — FENTANYL CITRATE 50 UG/ML
0.5 INJECTION INTRAVENOUS
Qty: 2500 | Refills: 0 | Status: DISCONTINUED | OUTPATIENT
Start: 2022-02-01 | End: 2022-02-02

## 2022-02-01 RX ORDER — DEXTROSE 50 % IN WATER 50 %
12.5 SYRINGE (ML) INTRAVENOUS ONCE
Refills: 0 | Status: DISCONTINUED | OUTPATIENT
Start: 2022-02-01 | End: 2022-02-03

## 2022-02-01 RX ORDER — PROPOFOL 10 MG/ML
13.38 INJECTION, EMULSION INTRAVENOUS
Qty: 1000 | Refills: 0 | Status: DISCONTINUED | OUTPATIENT
Start: 2022-02-01 | End: 2022-02-02

## 2022-02-01 RX ORDER — MAGNESIUM SULFATE 500 MG/ML
2 VIAL (ML) INJECTION
Refills: 0 | Status: COMPLETED | OUTPATIENT
Start: 2022-02-01 | End: 2022-02-01

## 2022-02-01 RX ORDER — CHLORHEXIDINE GLUCONATE 213 G/1000ML
15 SOLUTION TOPICAL ONCE
Refills: 0 | Status: DISCONTINUED | OUTPATIENT
Start: 2022-02-01 | End: 2022-02-01

## 2022-02-01 RX ORDER — DEXTROSE 50 % IN WATER 50 %
12.5 SYRINGE (ML) INTRAVENOUS ONCE
Refills: 0 | Status: DISCONTINUED | OUTPATIENT
Start: 2022-02-01 | End: 2022-02-01

## 2022-02-01 RX ORDER — NOREPINEPHRINE BITARTRATE/D5W 8 MG/250ML
0.05 PLASTIC BAG, INJECTION (ML) INTRAVENOUS
Qty: 8 | Refills: 0 | Status: DISCONTINUED | OUTPATIENT
Start: 2022-02-01 | End: 2022-02-02

## 2022-02-01 RX ORDER — FAMOTIDINE 10 MG/ML
20 INJECTION INTRAVENOUS EVERY 12 HOURS
Refills: 0 | Status: DISCONTINUED | OUTPATIENT
Start: 2022-02-01 | End: 2022-02-02

## 2022-02-01 RX ORDER — GLUCAGON INJECTION, SOLUTION 0.5 MG/.1ML
1 INJECTION, SOLUTION SUBCUTANEOUS ONCE
Refills: 0 | Status: DISCONTINUED | OUTPATIENT
Start: 2022-02-01 | End: 2022-02-01

## 2022-02-01 RX ADMIN — Medication 25 MILLIGRAM(S): at 23:01

## 2022-02-01 RX ADMIN — SODIUM CHLORIDE 100 MILLILITER(S): 9 INJECTION, SOLUTION INTRAVENOUS at 18:38

## 2022-02-01 RX ADMIN — SODIUM CHLORIDE 1000 MILLILITER(S): 9 INJECTION, SOLUTION INTRAVENOUS at 18:00

## 2022-02-01 RX ADMIN — FENTANYL CITRATE 50 MICROGRAM(S): 50 INJECTION INTRAVENOUS at 17:57

## 2022-02-01 RX ADMIN — PROPOFOL 5 MICROGRAM(S)/KG/MIN: 10 INJECTION, EMULSION INTRAVENOUS at 22:40

## 2022-02-01 RX ADMIN — Medication 5.84 MICROGRAM(S)/KG/MIN: at 18:37

## 2022-02-01 RX ADMIN — FENTANYL CITRATE 0.62 MICROGRAM(S)/KG/HR: 50 INJECTION INTRAVENOUS at 18:37

## 2022-02-01 RX ADMIN — Medication 25 GRAM(S): at 19:22

## 2022-02-01 RX ADMIN — Medication 100 MILLIEQUIVALENT(S): at 22:32

## 2022-02-01 RX ADMIN — FENTANYL CITRATE 50 MICROGRAM(S): 50 INJECTION INTRAVENOUS at 18:00

## 2022-02-01 RX ADMIN — EPINEPHRINE 11.7 MICROGRAM(S)/KG/MIN: 0.3 INJECTION INTRAMUSCULAR; SUBCUTANEOUS at 18:36

## 2022-02-01 RX ADMIN — FENTANYL CITRATE 50 MICROGRAM(S): 50 INJECTION INTRAVENOUS at 17:55

## 2022-02-01 RX ADMIN — PROPOFOL 5 MICROGRAM(S)/KG/MIN: 10 INJECTION, EMULSION INTRAVENOUS at 18:36

## 2022-02-01 RX ADMIN — PANTOPRAZOLE SODIUM 40 MILLIGRAM(S): 20 TABLET, DELAYED RELEASE ORAL at 19:22

## 2022-02-01 NOTE — PROVIDER CONTACT NOTE (CRITICAL VALUE NOTIFICATION) - ACTION/TREATMENT ORDERED:
PA made aware. LR bolus active, no other interventions at current time. Pt will be monitored closely.
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
## 2022-02-01 NOTE — CONSULT NOTE ADULT - ATTENDING COMMENTS
S/P acute hypotension and bradycardia intraop during injection of isosulfan blue dye in prep for bilateral mastectomy  Possible anaphylactic shock  Pressor needs of NE and epinephrine are stable  Her lips are swollen but not tongue.   No rash now, no wheezing  It is possible this is a reaction to something else but appears temporally related to the dye.  Leak test in AM; for now keep intubated with propofol/fentanyl sedation to RASS -3 to -4  Perfusion appears excellent on /hour  Got decadron 40 and ; likely enough steroid for today, will reevaluate in AM  Benadryl/pepcid as antihistamine
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
## 2022-02-01 NOTE — H&P ADULT - NSHPLABSRESULTS_GEN_ALL_CORE
Blood Gas Profile - Arterial (02.01.22 @ 18:03)   pH, Arterial: 7.46   pCO2, Arterial: 29 mmHg   pO2, Arterial: 215 mmHg   HCO3, Arterial: 21 mmol/L   Base Excess, Arterial: -2.1 mmol/L   Oxygen Saturation, Arterial: 97.9 %   Total CO2, Arterial: 22 mmol/L     Basic Metabolic Panel (02.01.22 @ 18:02)   Sodium, Serum: 140 mmol/L   Chloride, Serum: 108 mmol/L   Carbon Dioxide, Serum: 18 mmol/L   Anion Gap, Serum: 14 mmol/L   Blood Urea Nitrogen, Serum: 10 mg/dL   Creatinine, Serum: 0.49 mg/dL   Glucose, Serum: 202 mg/dL     Complete Blood Count (02.01.22 @ 18:02)   Nucleated RBC: 0 /100 WBCs   WBC Count: 15.83 K/uL   RBC Count: 3.71 M/uL   Hemoglobin: 11.9 g/dL   Hematocrit: 35.0 %   Mean Cell Volume: 94.3 fl   Mean Cell Hemoglobin: 32.1 pg   Mean Cell Hemoglobin Conc: 34.0 gm/dL   Red Cell Distrib Width: 11.7 %   Platelet Count - Automated: 191 K/uL
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
## 2022-02-01 NOTE — H&P ADULT - NSHPPHYSICALEXAM_GEN_ALL_CORE
Pre-operative physical exam:  General- well appearing female in NAD  Card- no peripheral edema  Pulm- no acute respiratory distress, speaking in complete sentences  Extremities- warm and well perfused.
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
## 2022-02-01 NOTE — BRIEF OPERATIVE NOTE - OPERATION/FINDINGS
Bilateral breasts prepped and draped. Isosulfan blue dye injected into bilateral breast and massaged into lymphatics. Left vertical breast incision made inferiorly to left areola. Dissected down to breast implant capsule. Case aborted secondary to hypotension and bradycardia 15-20min into case, associated peripheral erythema, working diagnosis of anaphylaxis. Levophed and epinephrine infusions initiated and titrated to BP and HR response. Bilateral breasts prepped and draped. Isosulfan blue dye injected into bilateral breast and massaged into lymphatics. Left vertical breast incision made inferiorly to left areola. Dissected down to breast implant capsule. Case aborted secondary to hypotension and bradycardia 15-20min into case, associated peripheral erythema, working diagnosis of anaphylaxis. Levophed and epinephrine infusions initiated and titrated to BP and HR response after steroid and anti-histamine boluses.

## 2022-02-01 NOTE — BRIEF OPERATIVE NOTE - NSICDXBRIEFPROCEDURE_GEN_ALL_CORE_FT
PROCEDURES:  Incision of lesion of left breast 01-Feb-2022 18:55:49  Nacho Hernandez  Intraoperative identification of sentinel lymph node using injection of dye 01-Feb-2022 18:57:20  Nacho Hernandez

## 2022-02-02 LAB
A1C WITH ESTIMATED AVERAGE GLUCOSE RESULT: 4.8 % — SIGNIFICANT CHANGE UP (ref 4–5.6)
ANION GAP SERPL CALC-SCNC: 9 MMOL/L — SIGNIFICANT CHANGE UP (ref 5–17)
BASOPHILS # BLD AUTO: 0.03 K/UL — SIGNIFICANT CHANGE UP (ref 0–0.2)
BASOPHILS NFR BLD AUTO: 0.2 % — SIGNIFICANT CHANGE UP (ref 0–2)
BUN SERPL-MCNC: 6 MG/DL — LOW (ref 7–23)
CALCIUM SERPL-MCNC: 7.5 MG/DL — LOW (ref 8.4–10.5)
CHLORIDE SERPL-SCNC: 111 MMOL/L — HIGH (ref 96–108)
CO2 SERPL-SCNC: 22 MMOL/L — SIGNIFICANT CHANGE UP (ref 22–31)
CREAT SERPL-MCNC: 0.42 MG/DL — LOW (ref 0.5–1.3)
EOSINOPHIL # BLD AUTO: 0 K/UL — SIGNIFICANT CHANGE UP (ref 0–0.5)
EOSINOPHIL NFR BLD AUTO: 0 % — SIGNIFICANT CHANGE UP (ref 0–6)
ESTIMATED AVERAGE GLUCOSE: 91 MG/DL — SIGNIFICANT CHANGE UP (ref 68–114)
GLUCOSE BLDC GLUCOMTR-MCNC: 114 MG/DL — HIGH (ref 70–99)
GLUCOSE BLDC GLUCOMTR-MCNC: 80 MG/DL — SIGNIFICANT CHANGE UP (ref 70–99)
GLUCOSE BLDC GLUCOMTR-MCNC: 81 MG/DL — SIGNIFICANT CHANGE UP (ref 70–99)
GLUCOSE BLDC GLUCOMTR-MCNC: 95 MG/DL — SIGNIFICANT CHANGE UP (ref 70–99)
GLUCOSE SERPL-MCNC: 121 MG/DL — HIGH (ref 70–99)
HCT VFR BLD CALC: 35.8 % — SIGNIFICANT CHANGE UP (ref 34.5–45)
HGB BLD-MCNC: 12 G/DL — SIGNIFICANT CHANGE UP (ref 11.5–15.5)
IMM GRANULOCYTES NFR BLD AUTO: 0.5 % — SIGNIFICANT CHANGE UP (ref 0–1.5)
LYMPHOCYTES # BLD AUTO: 0.98 K/UL — LOW (ref 1–3.3)
LYMPHOCYTES # BLD AUTO: 6.1 % — LOW (ref 13–44)
MAGNESIUM SERPL-MCNC: 2 MG/DL — SIGNIFICANT CHANGE UP (ref 1.6–2.6)
MCHC RBC-ENTMCNC: 32.1 PG — SIGNIFICANT CHANGE UP (ref 27–34)
MCHC RBC-ENTMCNC: 33.5 GM/DL — SIGNIFICANT CHANGE UP (ref 32–36)
MCV RBC AUTO: 95.7 FL — SIGNIFICANT CHANGE UP (ref 80–100)
MONOCYTES # BLD AUTO: 1.22 K/UL — HIGH (ref 0–0.9)
MONOCYTES NFR BLD AUTO: 7.6 % — SIGNIFICANT CHANGE UP (ref 2–14)
NEUTROPHILS # BLD AUTO: 13.74 K/UL — HIGH (ref 1.8–7.4)
NEUTROPHILS NFR BLD AUTO: 85.6 % — HIGH (ref 43–77)
NRBC # BLD: 0 /100 WBCS — SIGNIFICANT CHANGE UP (ref 0–0)
PHOSPHATE SERPL-MCNC: 3.6 MG/DL — SIGNIFICANT CHANGE UP (ref 2.5–4.5)
PLATELET # BLD AUTO: 254 K/UL — SIGNIFICANT CHANGE UP (ref 150–400)
POTASSIUM SERPL-MCNC: 4 MMOL/L — SIGNIFICANT CHANGE UP (ref 3.5–5.3)
POTASSIUM SERPL-SCNC: 4 MMOL/L — SIGNIFICANT CHANGE UP (ref 3.5–5.3)
RBC # BLD: 3.74 M/UL — LOW (ref 3.8–5.2)
RBC # FLD: 11.8 % — SIGNIFICANT CHANGE UP (ref 10.3–14.5)
SODIUM SERPL-SCNC: 142 MMOL/L — SIGNIFICANT CHANGE UP (ref 135–145)
WBC # BLD: 16.37 K/UL — HIGH (ref 3.8–10.5)
WBC # FLD AUTO: 16.37 K/UL — HIGH (ref 3.8–10.5)

## 2022-02-02 PROCEDURE — 99214 OFFICE O/P EST MOD 30 MIN: CPT

## 2022-02-02 PROCEDURE — 71045 X-RAY EXAM CHEST 1 VIEW: CPT | Mod: 26

## 2022-02-02 RX ORDER — ENOXAPARIN SODIUM 100 MG/ML
40 INJECTION SUBCUTANEOUS EVERY 24 HOURS
Refills: 0 | Status: DISCONTINUED | OUTPATIENT
Start: 2022-02-02 | End: 2022-02-03

## 2022-02-02 RX ORDER — HEPARIN SODIUM 5000 [USP'U]/ML
5000 INJECTION INTRAVENOUS; SUBCUTANEOUS EVERY 8 HOURS
Refills: 0 | Status: DISCONTINUED | OUTPATIENT
Start: 2022-02-02 | End: 2022-02-02

## 2022-02-02 RX ORDER — BENZOCAINE AND MENTHOL 5; 1 G/100ML; G/100ML
1 LIQUID ORAL EVERY 4 HOURS
Refills: 0 | Status: DISCONTINUED | OUTPATIENT
Start: 2022-02-02 | End: 2022-02-03

## 2022-02-02 RX ORDER — ERYTHROMYCIN BASE 5 MG/GRAM
1 OINTMENT (GRAM) OPHTHALMIC (EYE) EVERY 6 HOURS
Refills: 0 | Status: DISCONTINUED | OUTPATIENT
Start: 2022-02-02 | End: 2022-02-03

## 2022-02-02 RX ORDER — CHLORHEXIDINE GLUCONATE 213 G/1000ML
15 SOLUTION TOPICAL EVERY 12 HOURS
Refills: 0 | Status: DISCONTINUED | OUTPATIENT
Start: 2022-02-02 | End: 2022-02-02

## 2022-02-02 RX ORDER — ZOLPIDEM TARTRATE 10 MG/1
5 TABLET ORAL AT BEDTIME
Refills: 0 | Status: DISCONTINUED | OUTPATIENT
Start: 2022-02-02 | End: 2022-02-03

## 2022-02-02 RX ORDER — ACETAMINOPHEN 500 MG
650 TABLET ORAL EVERY 6 HOURS
Refills: 0 | Status: DISCONTINUED | OUTPATIENT
Start: 2022-02-02 | End: 2022-02-03

## 2022-02-02 RX ADMIN — CHLORHEXIDINE GLUCONATE 15 MILLILITER(S): 213 SOLUTION TOPICAL at 06:48

## 2022-02-02 RX ADMIN — ZOLPIDEM TARTRATE 5 MILLIGRAM(S): 10 TABLET ORAL at 22:07

## 2022-02-02 RX ADMIN — Medication 1 APPLICATION(S): at 17:27

## 2022-02-02 RX ADMIN — Medication 2: at 00:17

## 2022-02-02 RX ADMIN — PROPOFOL 5 MICROGRAM(S)/KG/MIN: 10 INJECTION, EMULSION INTRAVENOUS at 05:34

## 2022-02-02 RX ADMIN — Medication 25 MILLIGRAM(S): at 06:48

## 2022-02-02 RX ADMIN — ENOXAPARIN SODIUM 40 MILLIGRAM(S): 100 INJECTION SUBCUTANEOUS at 19:38

## 2022-02-02 RX ADMIN — FAMOTIDINE 20 MILLIGRAM(S): 10 INJECTION INTRAVENOUS at 17:27

## 2022-02-02 RX ADMIN — SODIUM CHLORIDE 100 MILLILITER(S): 9 INJECTION, SOLUTION INTRAVENOUS at 07:03

## 2022-02-02 RX ADMIN — Medication 25 GRAM(S): at 00:07

## 2022-02-02 RX ADMIN — CHLORHEXIDINE GLUCONATE 1 APPLICATION(S): 213 SOLUTION TOPICAL at 06:49

## 2022-02-02 RX ADMIN — Medication 100 MILLIEQUIVALENT(S): at 00:07

## 2022-02-02 RX ADMIN — FAMOTIDINE 20 MILLIGRAM(S): 10 INJECTION INTRAVENOUS at 06:48

## 2022-02-02 RX ADMIN — Medication 1 APPLICATION(S): at 23:24

## 2022-02-02 NOTE — PROGRESS NOTE ADULT - SUBJECTIVE AND OBJECTIVE BOX
STATUS POST:  2/1: Aborted due to anaphylaxis - nipple sparing  mastectomy bilateral with tissue expander, L breast incision stapled     SUBJECTIVE: Patient seen and examined bedside by chief resident.  patient intubated, however alert and awake. writing on paper to communicate to team. complains of difficulty taking deep breathes due to constant need to spit.     norepinephrine Infusion 0.05 MICROgram(s)/kG/Min IV Continuous <Continuous>      Vital Signs Last 24 Hrs  T(C): 36.9 (02 Feb 2022 05:01), Max: 37.4 (01 Feb 2022 22:12)  T(F): 98.4 (02 Feb 2022 05:01), Max: 99.3 (01 Feb 2022 22:12)  HR: 65 (02 Feb 2022 05:24) (56 - 109)  BP: 112/55 (01 Feb 2022 19:46) (112/55 - 131/69)  BP(mean): 76 (01 Feb 2022 19:46) (76 - 93)  RR: 12 (02 Feb 2022 05:24) (8 - 29)  SpO2: 100% (02 Feb 2022 05:24) (98% - 100%)  I&O's Detail    01 Feb 2022 07:01  -  02 Feb 2022 07:00  --------------------------------------------------------  IN:    EPINEPHrine: 39.8 mL    FentaNYL: 68.5 mL    Lactated Ringers: 1000 mL    Lactated Ringers Bolus: 1000 mL    Norepinephrine: 131.4 mL    Propofol: 181.4 mL  Total IN: 2421.1 mL    OUT:    Indwelling Catheter - Urethral (mL): 1125 mL  Total OUT: 1125 mL    Total NET: 1296.1 mL          General: NAD, resting comfortably in bed, intubated   Breast: left breast incision with abdominal pad over, clean, dry, intact   Abd: soft, NT/ND.  Extrem: WWP, no edema, SCDs in place        LABS:                        12.0   16.37 )-----------( 254      ( 02 Feb 2022 05:52 )             35.8     02-02    142  |  111<H>  |  6<L>  ----------------------------<  121<H>  4.0   |  22  |  0.42<L>    Ca    7.5<L>      02 Feb 2022 05:54  Phos  3.6     02-02  Mg     2.0     02-02            RADIOLOGY & ADDITIONAL STUDIES:
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
## 2022-02-03 ENCOUNTER — TRANSCRIPTION ENCOUNTER (OUTPATIENT)
Age: 52
End: 2022-02-03

## 2022-02-03 ENCOUNTER — NON-APPOINTMENT (OUTPATIENT)
Age: 52
End: 2022-02-03

## 2022-02-03 VITALS — HEART RATE: 80 BPM

## 2022-02-03 DIAGNOSIS — T78.40XA ALLERGY, UNSPECIFIED, INITIAL ENCOUNTER: ICD-10-CM

## 2022-02-03 LAB
ANION GAP SERPL CALC-SCNC: 11 MMOL/L — SIGNIFICANT CHANGE UP (ref 5–17)
APPEARANCE UR: CLEAR — SIGNIFICANT CHANGE UP
BACTERIA # UR AUTO: SIGNIFICANT CHANGE UP /HPF
BILIRUB UR-MCNC: NEGATIVE — SIGNIFICANT CHANGE UP
BUN SERPL-MCNC: 7 MG/DL — SIGNIFICANT CHANGE UP (ref 7–23)
CALCIUM SERPL-MCNC: 8.2 MG/DL — LOW (ref 8.4–10.5)
CHLORIDE SERPL-SCNC: 105 MMOL/L — SIGNIFICANT CHANGE UP (ref 96–108)
CO2 SERPL-SCNC: 23 MMOL/L — SIGNIFICANT CHANGE UP (ref 22–31)
COLOR SPEC: YELLOW — SIGNIFICANT CHANGE UP
CREAT SERPL-MCNC: 0.5 MG/DL — SIGNIFICANT CHANGE UP (ref 0.5–1.3)
DIFF PNL FLD: ABNORMAL
EPI CELLS # UR: SIGNIFICANT CHANGE UP /HPF (ref 0–5)
GLUCOSE BLDC GLUCOMTR-MCNC: 100 MG/DL — HIGH (ref 70–99)
GLUCOSE BLDC GLUCOMTR-MCNC: 77 MG/DL — SIGNIFICANT CHANGE UP (ref 70–99)
GLUCOSE SERPL-MCNC: 84 MG/DL — SIGNIFICANT CHANGE UP (ref 70–99)
GLUCOSE UR QL: NEGATIVE — SIGNIFICANT CHANGE UP
HCT VFR BLD CALC: 33.9 % — LOW (ref 34.5–45)
HGB BLD-MCNC: 11 G/DL — LOW (ref 11.5–15.5)
KETONES UR-MCNC: NEGATIVE — SIGNIFICANT CHANGE UP
LEUKOCYTE ESTERASE UR-ACNC: NEGATIVE — SIGNIFICANT CHANGE UP
MAGNESIUM SERPL-MCNC: 1.6 MG/DL — SIGNIFICANT CHANGE UP (ref 1.6–2.6)
MCHC RBC-ENTMCNC: 31.3 PG — SIGNIFICANT CHANGE UP (ref 27–34)
MCHC RBC-ENTMCNC: 32.4 GM/DL — SIGNIFICANT CHANGE UP (ref 32–36)
MCV RBC AUTO: 96.6 FL — SIGNIFICANT CHANGE UP (ref 80–100)
NITRITE UR-MCNC: NEGATIVE — SIGNIFICANT CHANGE UP
NRBC # BLD: 0 /100 WBCS — SIGNIFICANT CHANGE UP (ref 0–0)
PH UR: 8 — SIGNIFICANT CHANGE UP (ref 5–8)
PHOSPHATE SERPL-MCNC: 2.8 MG/DL — SIGNIFICANT CHANGE UP (ref 2.5–4.5)
PLATELET # BLD AUTO: 142 K/UL — LOW (ref 150–400)
POTASSIUM SERPL-MCNC: 3.7 MMOL/L — SIGNIFICANT CHANGE UP (ref 3.5–5.3)
POTASSIUM SERPL-SCNC: 3.7 MMOL/L — SIGNIFICANT CHANGE UP (ref 3.5–5.3)
PROT UR-MCNC: NEGATIVE MG/DL — SIGNIFICANT CHANGE UP
RBC # BLD: 3.51 M/UL — LOW (ref 3.8–5.2)
RBC # FLD: 12 % — SIGNIFICANT CHANGE UP (ref 10.3–14.5)
RBC CASTS # UR COMP ASSIST: < 5 /HPF — SIGNIFICANT CHANGE UP
SODIUM SERPL-SCNC: 139 MMOL/L — SIGNIFICANT CHANGE UP (ref 135–145)
SP GR SPEC: 1.01 — SIGNIFICANT CHANGE UP (ref 1–1.03)
UROBILINOGEN FLD QL: 1 E.U./DL — SIGNIFICANT CHANGE UP
WBC # BLD: 5.14 K/UL — SIGNIFICANT CHANGE UP (ref 3.8–10.5)
WBC # FLD AUTO: 5.14 K/UL — SIGNIFICANT CHANGE UP (ref 3.8–10.5)
WBC UR QL: < 5 /HPF — SIGNIFICANT CHANGE UP

## 2022-02-03 PROCEDURE — 99232 SBSQ HOSP IP/OBS MODERATE 35: CPT | Mod: GC

## 2022-02-03 RX ORDER — ALPRAZOLAM 0.25 MG
0 TABLET ORAL
Qty: 0 | Refills: 0 | DISCHARGE

## 2022-02-03 RX ORDER — MAGNESIUM SULFATE 500 MG/ML
2 VIAL (ML) INJECTION ONCE
Refills: 0 | Status: COMPLETED | OUTPATIENT
Start: 2022-02-03 | End: 2022-02-03

## 2022-02-03 RX ORDER — POTASSIUM CHLORIDE 20 MEQ
20 PACKET (EA) ORAL ONCE
Refills: 0 | Status: COMPLETED | OUTPATIENT
Start: 2022-02-03 | End: 2022-02-03

## 2022-02-03 RX ORDER — ONDANSETRON 8 MG/1
4 TABLET, FILM COATED ORAL ONCE
Refills: 0 | Status: COMPLETED | OUTPATIENT
Start: 2022-02-03 | End: 2022-02-03

## 2022-02-03 RX ORDER — ACETAMINOPHEN 500 MG
1000 TABLET ORAL ONCE
Refills: 0 | Status: COMPLETED | OUTPATIENT
Start: 2022-02-03 | End: 2022-02-03

## 2022-02-03 RX ORDER — ONDANSETRON 8 MG/1
4 TABLET, FILM COATED ORAL EVERY 4 HOURS
Refills: 0 | Status: DISCONTINUED | OUTPATIENT
Start: 2022-02-03 | End: 2022-02-03

## 2022-02-03 RX ORDER — SODIUM,POTASSIUM PHOSPHATES 278-250MG
1 POWDER IN PACKET (EA) ORAL ONCE
Refills: 0 | Status: COMPLETED | OUTPATIENT
Start: 2022-02-03 | End: 2022-02-03

## 2022-02-03 RX ADMIN — Medication 650 MILLIGRAM(S): at 06:03

## 2022-02-03 RX ADMIN — Medication 650 MILLIGRAM(S): at 07:05

## 2022-02-03 RX ADMIN — Medication 1 PACKET(S): at 07:05

## 2022-02-03 RX ADMIN — Medication 20 MILLIEQUIVALENT(S): at 07:01

## 2022-02-03 RX ADMIN — ONDANSETRON 4 MILLIGRAM(S): 8 TABLET, FILM COATED ORAL at 09:40

## 2022-02-03 RX ADMIN — Medication 1000 MILLIGRAM(S): at 13:01

## 2022-02-03 RX ADMIN — Medication 25 GRAM(S): at 07:03

## 2022-02-03 RX ADMIN — Medication 1000 MILLIGRAM(S): at 08:00

## 2022-02-03 RX ADMIN — ONDANSETRON 4 MILLIGRAM(S): 8 TABLET, FILM COATED ORAL at 06:03

## 2022-02-03 RX ADMIN — Medication 400 MILLIGRAM(S): at 07:01

## 2022-02-03 RX ADMIN — BENZOCAINE AND MENTHOL 1 LOZENGE: 5; 1 LIQUID ORAL at 03:37

## 2022-02-03 RX ADMIN — Medication 400 MILLIGRAM(S): at 12:46

## 2022-02-03 RX ADMIN — Medication 1 APPLICATION(S): at 12:46

## 2022-02-03 RX ADMIN — Medication 1 APPLICATION(S): at 06:04
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
## 2022-02-03 NOTE — DISCHARGE NOTE PROVIDER - NSDCCPCAREPLAN_GEN_ALL_CORE_FT
PRINCIPAL DISCHARGE DIAGNOSIS  Diagnosis: Invasive carcinoma of breast  Assessment and Plan of Treatment: Aborted procedure due to intra-operative anaphylactic reaction. Patient stabilized in SICU and deemed stable for discharge with plan for return to OR next week.

## 2022-02-03 NOTE — DISCHARGE NOTE NURSING/CASE MANAGEMENT/SOCIAL WORK - PATIENT PORTAL LINK FT
You can access the FollowMyHealth Patient Portal offered by [facility] by registering at the following website: [email or web address]. By joining [employer]’s FollowMyHealth portal, you will also be able to view your health information using other applications (apps) compatible with our system.
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
## 2022-02-03 NOTE — DISCHARGE NOTE NURSING/CASE MANAGEMENT/SOCIAL WORK - NSDCPEFALRISK_GEN_ALL_CORE
For information on Fall & Injury Prevention, visit: [email or web address] OR  [email or web address] OR  https://www.cdc.gov/steadi/patient.html

## 2022-02-03 NOTE — DISCHARGE NOTE PROVIDER - NSDCFUSCHEDAPPT_GEN_ALL_CORE_FT
[facility] ; 02/14/2022 ; [facility] PlastSurg [address] [facility] ; 02/23/2022 ; [facility] HemOnc  [address] [facility] ; 02/23/2022 ; [facility]

## 2022-02-03 NOTE — DISCHARGE NOTE PROVIDER - NSDCACTIVITY_GEN_ALL_CORE
Showering allowed/Stairs allowed/Driving allowed/Walking - Indoors allowed/No heavy lifting/straining/Follow Instructions Provided by your Surgical Team

## 2022-02-03 NOTE — DISCHARGE NOTE PROVIDER - CARE PROVIDER_API CALL
Juli Elise (DO)  Surgery  [address] [place] [address] [id number]  Phone: [phone]  Fax: [phone]  Follow Up Time:

## 2022-02-03 NOTE — PROGRESS NOTE ADULT - ASSESSMENT
52 yo F with left invasive breast cancer (ER, [place] +ve, Her2 -ve) s/p attempted bilateral mastectomy aborted secondary to isosulfan blue anaphylaxis intra-operatively; patient transferred to SICU intubated. Patient extubated yesterday uneventfully. Patient tolerating room air well. +N. +HA.     Recommendations:  - Pain and nausea control  - Regular diet  - Surgical dressing replaced: xeroform + abdominal pad + tegaderm, will likely discharge with xeroform  - Allergist discussion pending  - May step down to Tele today  - Remaining care at discretion of SICU team

## 2022-02-03 NOTE — DISCHARGE NOTE PROVIDER - NSDCMRMEDTOKEN_GEN_ALL_CORE_FT
Ambien 10 mg oral tablet: 1 tab(s) orally once a day (at bedtime)   ALPRAZolam 0.5 mg oral tablet: 1 tab(s) orally once a day, As Needed  Ambien 10 mg oral tablet: 1 tab(s) orally once a day (at bedtime)

## 2022-02-03 NOTE — PROGRESS NOTE ADULT - ATTENDING COMMENTS
s/p possible anaphylactic reaction in OR  T to 100.3 today with headache and nausea that feels likel her migraines  lungs clear, breast wound Clean  UA negative  Plan is for patient to be discharged; she can monitor fever;  HA better with tylenol and zofran
Breast cancer s/p anaphylaxis in OR.  Extubated successfully after + leak test  Start diet  Likely from dye although possibly rocuronium or ancef; would use vanco for prophylaxis and consider trying not to paralyze; discussed avoiding dye for lymphatics

## 2022-02-03 NOTE — DISCHARGE NOTE PROVIDER - NSDCFUADDAPPT_GEN_ALL_CORE_FT
Surgery Scheduler from Dr. Elise's office will call to schedule your repeat surgery for Tuesday, 2/8/22. You will be pre-medicated the night before surgery in preparation for returning to the OR; medications will be sent to the pharmacy of your choosing.

## 2022-02-03 NOTE — DISCHARGE NOTE PROVIDER - NSDCFUADDINST_GEN_ALL_CORE_FT
Follow up with Dr. Orlando in 1 week. Call the office at  to schedule your appointment.  You may shower; soap and water over incision sites. Do not scrub. Pat dry when done. Apply a new dressing after showering daily: place xeroform over surgical incision, cover with gauze and tape around edges. No tub bathing or swimming until cleared. Keep incision sites out of the sun as scars will darken. No heavy lifting (>10lbs) or strenuous exercise. Diet: Regular diet as tolerated. 64 fluid ounces water daily. Drink small sips throughout the day. Continue diet as outlined by your surgeon. You should be urinating at least 3-4x per day. Call the office if you experience increasing abdominal pain, nausea, vomiting, or temperature >100.4F.  NO ASPIRIN OR NSAIDs until approved by Dr. Elise. Avoid alcoholic beverages until cleared by Dr. Elise.   Follow up with Dr. Orlando in 1 week. Call the office at  to schedule your appointment.  You may shower; soap and water over incision sites. Do not scrub. Pat dry when done. Apply a new dressing after showering daily: place xeroform over surgical incision, cover with gauze and tape around edges. No tub bathing or swimming until cleared. Keep incision sites out of the sun as scars will darken. No heavy lifting (>10lbs) or strenuous exercise. Diet: Regular diet as tolerated. 64 fluid ounces water daily. Drink small sips throughout the day. Continue diet as outlined by your surgeon. You should be urinating at least 3-4x per day. Call the office if you experience increasing abdominal pain, nausea, vomiting, or temperature >100.4F.  NO ASPIRIN OR NSAIDs until approved by Dr. Elise. Avoid alcoholic beverages until cleared by Dr. Elise.    Please take Tylenol as prescribed over the counter every 6 hours as needed for pain. Do not exceed 4000mg Tylenol/acetaminophen in 24 hours.    Follow up with Dr. Orlando in 1 week. Call the office at  to schedule your appointment.  You may shower; soap and water over incision sites. Do not scrub. Pat dry when done. Apply a new dressing after showering once-a-day: as instructed in the hospital, place a xeroform strip over the surgical incision and cover with gauze pads, secure with paper tape. No tub bathing or swimming until cleared. Keep incision sites out of the sun as scars will darken. No heavy lifting (>10lbs) or strenuous exercise. Diet: Regular diet as tolerated. 64 fluid ounces water daily. Drink small sips throughout the day. Continue diet as outlined by your surgeon. You should be urinating at least 3-4x per day. Call the office if you experience increasing abdominal pain, nausea, vomiting, or temperature >100.4F.  NO ASPIRIN OR NSAIDs until approved by Dr. Elise. Avoid alcoholic beverages until cleared by Dr. Elise.    Please take Tylenol as prescribed over the counter every 6 hours as needed for pain. Do not exceed 4000mg Tylenol/acetaminophen in 24 hours.

## 2022-02-03 NOTE — PROGRESS NOTE ADULT - SUBJECTIVE AND OBJECTIVE BOX
SUBJECTIVE:   Patient seen and evaluated. Patient currently endorses nausea as well as a mild headache. She denies any dizziness.     enoxaparin Injectable 40 milliGRAM(s) SubCutaneous every 24 hours      Vital Signs Last 24 Hrs  T(C): 37.9 (03 Feb 2022 05:20), Max: 37.9 (03 Feb 2022 05:20)  T(F): 100.3 (03 Feb 2022 05:20), Max: 100.3 (03 Feb 2022 05:20)  HR: 77 (03 Feb 2022 07:00) (64 - 104)  BP: 109/65 (03 Feb 2022 07:00) (94/65 - 127/68)  BP(mean): 82 (03 Feb 2022 07:00) (73 - 107)  RR: 17 (03 Feb 2022 07:00) (12 - 90)  SpO2: 95% (03 Feb 2022 07:00) (94% - 100%)  I&O's Detail    02 Feb 2022 07:01  -  03 Feb 2022 07:00  --------------------------------------------------------  IN:    FentaNYL: 9.2 mL    Lactated Ringers: 1000 mL    Norepinephrine: 11 mL    Oral Fluid: 2120 mL  Total IN: 3140.2 mL    OUT:    Indwelling Catheter - Urethral (mL): 1325 mL    Propofol: 0 mL    Voided (mL): 1350 mL  Total OUT: 2675 mL    Total NET: 465.2 mL      03 Feb 2022 07:01  -  03 Feb 2022 07:20  --------------------------------------------------------  IN:    IV PiggyBack: 150 mL  Total IN: 150 mL    OUT:  Total OUT: 0 mL    Total NET: 150 mL          General: NAD, resting comfortably in bed  C/V: Normal rate.   Pulm: Nonlabored breathing, no respiratory distress. Speaking in complete sentences.  Chest: L breast surgical dressing with no blood or purulence accumulated; dressing taken down, wound c/d/i, no erythema/purulence/fluctuance. Staples intact.   Extrem: WWP, no edema, SCDs in place      LABS:                        11.0   5.14  )-----------( 142      ( 03 Feb 2022 06:05 )             33.9     02-03    139  |  105  |  7   ----------------------------<  84  3.7   |  23  |  0.50    Ca    8.2<L>      03 Feb 2022 06:05  Phos  2.8     02-03  Mg     1.6     02-03

## 2022-02-04 PROBLEM — F41.9 ANXIETY DISORDER, UNSPECIFIED: Chronic | Status: ACTIVE | Noted: 2022-01-31

## 2022-02-04 PROBLEM — Z86.16 PERSONAL HISTORY OF COVID-19: Chronic | Status: ACTIVE | Noted: 2022-02-01

## 2022-02-04 PROBLEM — Z87.898 PERSONAL HISTORY OF OTHER SPECIFIED CONDITIONS: Chronic | Status: ACTIVE | Noted: 2022-01-31

## 2022-02-04 PROBLEM — C50.912 MALIGNANT NEOPLASM OF UNSPECIFIED SITE OF LEFT FEMALE BREAST: Chronic | Status: ACTIVE | Noted: 2022-01-31

## 2022-02-04 LAB
SURGICAL PATHOLOGY STUDY: SIGNIFICANT CHANGE UP
TRYPTASE SERPL-MCNC: 37 UG/L — HIGH (ref 2.2–13.2)

## 2022-02-07 ENCOUNTER — APPOINTMENT (OUTPATIENT)
Dept: PLASTIC SURGERY | Facility: CLINIC | Age: 52
End: 2022-02-07
Payer: MEDICAID

## 2022-02-07 ENCOUNTER — OUTPATIENT (OUTPATIENT)
Dept: OUTPATIENT SERVICES | Facility: HOSPITAL | Age: 52
LOS: 1 days | End: 2022-02-07
Payer: COMMERCIAL

## 2022-02-07 ENCOUNTER — TRANSCRIPTION ENCOUNTER (OUTPATIENT)
Age: 52
End: 2022-02-07

## 2022-02-07 DIAGNOSIS — Z86.018 PERSONAL HISTORY OF OTHER BENIGN NEOPLASM: Chronic | ICD-10-CM

## 2022-02-07 DIAGNOSIS — Z98.82 BREAST IMPLANT STATUS: Chronic | ICD-10-CM

## 2022-02-07 DIAGNOSIS — N88.8 OTHER SPECIFIED NONINFLAMMATORY DISORDERS OF CERVIX UTERI: Chronic | ICD-10-CM

## 2022-02-07 PROCEDURE — 78195 LYMPH SYSTEM IMAGING: CPT

## 2022-02-07 PROCEDURE — A9541: CPT

## 2022-02-07 PROCEDURE — 78195 LYMPH SYSTEM IMAGING: CPT | Mod: 26

## 2022-02-07 NOTE — ASU PATIENT PROFILE, ADULT - FALL HARM RISK - UNIVERSAL INTERVENTIONS
Bed in lowest position, wheels locked, appropriate side rails in place/Call bell, personal items and telephone in reach/Instruct patient to call for assistance before getting out of bed or chair/Non-slip footwear when patient is out of bed/[place] to call system/Physically safe environment - no spills, clutter or unnecessary equipment/Purposeful Proactive Rounding/Room/bathroom lighting operational, light cord in reach

## 2022-02-07 NOTE — ASU PATIENT PROFILE, ADULT - FALL HARM RISK - UNIVERSAL INTERVENTIONS
Bed in lowest position, wheels locked, appropriate side rails in place/Call bell, personal items and telephone in reach/Instruct patient to call for assistance before getting out of bed or chair/Non-slip footwear when patient is out of bed/[place] to call system/Physically safe environment - no spills, clutter or unnecessary equipment/Purposeful Proactive Rounding/Room/bathroom lighting operational, light cord in reach

## 2022-02-07 NOTE — ASU PATIENT PROFILE, ADULT - NSICDXPASTSURGICALHX_GEN_ALL_CORE_FT
PAST SURGICAL HISTORY:  Cervical mass removal    H/O breast implant     History of benign breast tumor removal right

## 2022-02-08 ENCOUNTER — RESULT REVIEW (OUTPATIENT)
Age: 52
End: 2022-02-08

## 2022-02-08 ENCOUNTER — APPOINTMENT (OUTPATIENT)
Dept: BREAST CENTER | Facility: CLINIC | Age: 52
End: 2022-02-08

## 2022-02-08 ENCOUNTER — OUTPATIENT (OUTPATIENT)
Dept: OUTPATIENT SERVICES | Facility: HOSPITAL | Age: 52
LOS: 1 days | Discharge: HOME | End: 2022-02-08
Payer: COMMERCIAL

## 2022-02-08 VITALS
SYSTOLIC BLOOD PRESSURE: 118 MMHG | RESPIRATION RATE: 16 BRPM | HEIGHT: 70.87 IN | TEMPERATURE: 98 F | WEIGHT: 134.48 LBS | HEART RATE: 83 BPM | OXYGEN SATURATION: 97 % | DIASTOLIC BLOOD PRESSURE: 78 MMHG

## 2022-02-08 DIAGNOSIS — N88.8 OTHER SPECIFIED NONINFLAMMATORY DISORDERS OF CERVIX UTERI: Chronic | ICD-10-CM

## 2022-02-08 DIAGNOSIS — Z86.018 PERSONAL HISTORY OF OTHER BENIGN NEOPLASM: Chronic | ICD-10-CM

## 2022-02-08 DIAGNOSIS — Z98.82 BREAST IMPLANT STATUS: Chronic | ICD-10-CM

## 2022-02-08 PROCEDURE — 88341 IMHCHEM/IMCYTCHM EA ADD ANTB: CPT | Mod: 26

## 2022-02-08 PROCEDURE — 88331 PATH CONSLTJ SURG 1 BLK 1SPC: CPT | Mod: 26

## 2022-02-08 PROCEDURE — 76098 X-RAY EXAM SURGICAL SPECIMEN: CPT | Mod: 26

## 2022-02-08 PROCEDURE — 88307 TISSUE EXAM BY PATHOLOGIST: CPT | Mod: 26

## 2022-02-08 PROCEDURE — 88302 TISSUE EXAM BY PATHOLOGIST: CPT | Mod: 26

## 2022-02-08 PROCEDURE — 88342 IMHCHEM/IMCYTCHM 1ST ANTB: CPT | Mod: 26

## 2022-02-08 PROCEDURE — 13101 CMPLX RPR TRUNK 2.6-7.5 CM: CPT

## 2022-02-08 PROCEDURE — 38900 IO MAP OF SENT LYMPH NODE: CPT | Mod: LT

## 2022-02-08 PROCEDURE — 88305 TISSUE EXAM BY PATHOLOGIST: CPT | Mod: 26

## 2022-02-08 PROCEDURE — 19303 MAST SIMPLE COMPLETE: CPT | Mod: 50

## 2022-02-08 PROCEDURE — 38525 BIOPSY/REMOVAL LYMPH NODES: CPT | Mod: 50

## 2022-02-08 PROCEDURE — 88332 PATH CONSLTJ SURG EA ADD BLK: CPT | Mod: 26

## 2022-02-08 PROCEDURE — 13102 CMPLX RPR TRUNK ADDL 5CM/<: CPT

## 2022-02-08 DEVICE — TISSEEL 4ML: Type: IMPLANTABLE DEVICE | Status: FUNCTIONAL

## 2022-02-08 RX ORDER — ACETAMINOPHEN 500 MG
1000 TABLET ORAL ONCE
Refills: 0 | Status: COMPLETED | OUTPATIENT
Start: 2022-02-08 | End: 2022-02-09

## 2022-02-08 RX ORDER — BUPIVACAINE 13.3 MG/ML
20 INJECTION, SUSPENSION, LIPOSOMAL INFILTRATION ONCE
Refills: 0 | Status: DISCONTINUED | OUTPATIENT
Start: 2022-02-08 | End: 2022-02-09

## 2022-02-08 RX ORDER — SODIUM CHLORIDE 9 MG/ML
1000 INJECTION, SOLUTION INTRAVENOUS
Refills: 0 | Status: DISCONTINUED | OUTPATIENT
Start: 2022-02-08 | End: 2022-02-09

## 2022-02-08 RX ORDER — LANOLIN ALCOHOL/MO/W.PET/CERES
1 CREAM (GRAM) TOPICAL AT BEDTIME
Refills: 0 | Status: DISCONTINUED | OUTPATIENT
Start: 2022-02-08 | End: 2022-02-08

## 2022-02-08 NOTE — BRIEF OPERATIVE NOTE - NSICDXBRIEFPROCEDURE_GEN_ALL_CORE_FT
PROCEDURES:  Nipple sparing mastectomy of left breast 08-Feb-2022 17:40:01  Gilberto Gao  Left mastectomy with sentinel lymph node biopsy 08-Feb-2022 17:40:19  Gilberto Gao  
PROCEDURES:  Closure, surgical wound or dehiscence, extensive or complex, secondary 08-Feb-2022 19:03:02  Jeanine Morataya E

## 2022-02-08 NOTE — ASU DISCHARGE PLAN (ADULT/PEDIATRIC) - NS MD DC FALL RISK RISK
For information on Fall & Injury Prevention, visit: [email or web address] OR  [email or web address] OR  https://www.cdc.gov/steadi/patient.html

## 2022-02-08 NOTE — PACU DISCHARGE NOTE - COMMENTS
Patient is hemodynamically stable and reports pain managed.  Meets PACU discharge criteria.  Report given to unit RN.  Patient trasnported via bed to unit.  Accompanied by PCAx2

## 2022-02-08 NOTE — BRIEF OPERATIVE NOTE - OPERATION/FINDINGS
Staples from previous incision removed prior to prep. Area prepped with chloraprep and betadine. Breast tissue and partial capsulectomy performed from clavicle, to inframammary fold from sternum to axillary tail. Skin sutures placed in skin proximate to mass to ab skin margin. Previous implant explanted in tact. Additional margins anterior sent for permanent section. Left sentinal lymph node biopsy performed through contiguous incision, hot count >34 sent for frozen section. Plastic surgery commenced their part of procedure
COmplex closure of wound following nipple sparing mastectomy

## 2022-02-08 NOTE — ASU DISCHARGE PLAN (ADULT/PEDIATRIC) - ASU DC SPECIAL INSTRUCTIONSFT
- no heavy lifting until you see Dr. Elise and Dr. Barry in the office  - You may take tylenol for pain. You may take oxycodone for severe pain  - You may shower, do not scrub  - Please empty the drain and keep a log of the output - no heavy lifting until you see Dr. Elise and Dr. Barry in the office  - You may take tylenol for pain. You may take oxycodone for severe pain  - You may shower, do not scrub, pat dry  - Please empty the drain and keep a log of the output

## 2022-02-08 NOTE — ASU DISCHARGE PLAN (ADULT/PEDIATRIC) - FOR NEXT 24 HOURS DO NOT:
Patient ID: Chrissy Pascal is a 83 [facility]   Statement Selected

## 2022-02-08 NOTE — ASU DISCHARGE PLAN (ADULT/PEDIATRIC) - CARE PROVIDER_API CALL
Juli Elise (DO)  Surgery  [address], [address] [place] [id number]  Phone: [phone]  Fax: [phone]  Follow Up Time:     Jose R Barry)  Plastic Surgery  [address] [address]  Phone: [phone]  Fax: [phone]  Follow Up Time:

## 2022-02-09 ENCOUNTER — TRANSCRIPTION ENCOUNTER (OUTPATIENT)
Age: 52
End: 2022-02-09

## 2022-02-09 VITALS
SYSTOLIC BLOOD PRESSURE: 116 MMHG | HEART RATE: 67 BPM | TEMPERATURE: 98 F | RESPIRATION RATE: 15 BRPM | OXYGEN SATURATION: 99 % | DIASTOLIC BLOOD PRESSURE: 67 MMHG

## 2022-02-09 LAB
ANION GAP SERPL CALC-SCNC: 11 MMOL/L — SIGNIFICANT CHANGE UP (ref 5–17)
BASOPHILS # BLD AUTO: 0.03 K/UL — SIGNIFICANT CHANGE UP (ref 0–0.2)
BASOPHILS NFR BLD AUTO: 0.3 % — SIGNIFICANT CHANGE UP (ref 0–2)
BUN SERPL-MCNC: 9 MG/DL — SIGNIFICANT CHANGE UP (ref 7–23)
CALCIUM SERPL-MCNC: 8.7 MG/DL — SIGNIFICANT CHANGE UP (ref 8.4–10.5)
CHLORIDE SERPL-SCNC: 104 MMOL/L — SIGNIFICANT CHANGE UP (ref 96–108)
CO2 SERPL-SCNC: 22 MMOL/L — SIGNIFICANT CHANGE UP (ref 22–31)
CREAT SERPL-MCNC: 0.47 MG/DL — LOW (ref 0.5–1.3)
EOSINOPHIL # BLD AUTO: 0.01 K/UL — SIGNIFICANT CHANGE UP (ref 0–0.5)
EOSINOPHIL NFR BLD AUTO: 0.1 % — SIGNIFICANT CHANGE UP (ref 0–6)
GLUCOSE SERPL-MCNC: 95 MG/DL — SIGNIFICANT CHANGE UP (ref 70–99)
HCT VFR BLD CALC: 33.9 % — LOW (ref 34.5–45)
HGB BLD-MCNC: 11 G/DL — LOW (ref 11.5–15.5)
IMM GRANULOCYTES NFR BLD AUTO: 0.6 % — SIGNIFICANT CHANGE UP (ref 0–1.5)
LYMPHOCYTES # BLD AUTO: 1.02 K/UL — SIGNIFICANT CHANGE UP (ref 1–3.3)
LYMPHOCYTES # BLD AUTO: 9.1 % — LOW (ref 13–44)
MAGNESIUM SERPL-MCNC: 1.7 MG/DL — SIGNIFICANT CHANGE UP (ref 1.6–2.6)
MCHC RBC-ENTMCNC: 31.6 PG — SIGNIFICANT CHANGE UP (ref 27–34)
MCHC RBC-ENTMCNC: 32.4 GM/DL — SIGNIFICANT CHANGE UP (ref 32–36)
MCV RBC AUTO: 97.4 FL — SIGNIFICANT CHANGE UP (ref 80–100)
MONOCYTES # BLD AUTO: 1.12 K/UL — HIGH (ref 0–0.9)
MONOCYTES NFR BLD AUTO: 10 % — SIGNIFICANT CHANGE UP (ref 2–14)
NEUTROPHILS # BLD AUTO: 8.91 K/UL — HIGH (ref 1.8–7.4)
NEUTROPHILS NFR BLD AUTO: 79.9 % — HIGH (ref 43–77)
NRBC # BLD: 0 /100 WBCS — SIGNIFICANT CHANGE UP (ref 0–0)
PHOSPHATE SERPL-MCNC: 3.5 MG/DL — SIGNIFICANT CHANGE UP (ref 2.5–4.5)
PLATELET # BLD AUTO: 246 K/UL — SIGNIFICANT CHANGE UP (ref 150–400)
POTASSIUM SERPL-MCNC: 4 MMOL/L — SIGNIFICANT CHANGE UP (ref 3.5–5.3)
POTASSIUM SERPL-SCNC: 4 MMOL/L — SIGNIFICANT CHANGE UP (ref 3.5–5.3)
RBC # BLD: 3.48 M/UL — LOW (ref 3.8–5.2)
RBC # FLD: 12.2 % — SIGNIFICANT CHANGE UP (ref 10.3–14.5)
SODIUM SERPL-SCNC: 137 MMOL/L — SIGNIFICANT CHANGE UP (ref 135–145)
WBC # BLD: 11.16 K/UL — HIGH (ref 3.8–10.5)
WBC # FLD AUTO: 11.16 K/UL — HIGH (ref 3.8–10.5)

## 2022-02-09 PROCEDURE — 88307 TISSUE EXAM BY PATHOLOGIST: CPT

## 2022-02-09 PROCEDURE — 88332 PATH CONSLTJ SURG EA ADD BLK: CPT

## 2022-02-09 PROCEDURE — 80048 BASIC METABOLIC PNL TOTAL CA: CPT

## 2022-02-09 PROCEDURE — 83735 ASSAY OF MAGNESIUM: CPT

## 2022-02-09 PROCEDURE — 36415 COLL VENOUS BLD VENIPUNCTURE: CPT

## 2022-02-09 PROCEDURE — 86900 BLOOD TYPING SEROLOGIC ABO: CPT

## 2022-02-09 PROCEDURE — 88305 TISSUE EXAM BY PATHOLOGIST: CPT

## 2022-02-09 PROCEDURE — 19303 MAST SIMPLE COMPLETE: CPT | Mod: LT

## 2022-02-09 PROCEDURE — C1889: CPT

## 2022-02-09 PROCEDURE — 88331 PATH CONSLTJ SURG 1 BLK 1SPC: CPT

## 2022-02-09 PROCEDURE — 88302 TISSUE EXAM BY PATHOLOGIST: CPT

## 2022-02-09 PROCEDURE — 86850 RBC ANTIBODY SCREEN: CPT

## 2022-02-09 PROCEDURE — 38525 BIOPSY/REMOVAL LYMPH NODES: CPT

## 2022-02-09 PROCEDURE — 88341 IMHCHEM/IMCYTCHM EA ADD ANTB: CPT

## 2022-02-09 PROCEDURE — 85025 COMPLETE CBC W/AUTO DIFF WBC: CPT

## 2022-02-09 PROCEDURE — 86901 BLOOD TYPING SEROLOGIC RH(D): CPT

## 2022-02-09 PROCEDURE — 84100 ASSAY OF PHOSPHORUS: CPT

## 2022-02-09 RX ORDER — MAGNESIUM SULFATE 500 MG/ML
1 VIAL (ML) INJECTION ONCE
Refills: 0 | Status: COMPLETED | OUTPATIENT
Start: 2022-02-09 | End: 2022-02-09

## 2022-02-09 RX ORDER — ACETAMINOPHEN 500 MG
650 TABLET ORAL ONCE
Refills: 0 | Status: COMPLETED | OUTPATIENT
Start: 2022-02-09 | End: 2022-02-09

## 2022-02-09 RX ORDER — MAGNESIUM SULFATE 500 MG/ML
1 VIAL (ML) INJECTION ONCE
Refills: 0 | Status: DISCONTINUED | OUTPATIENT
Start: 2022-02-09 | End: 2022-02-09

## 2022-02-09 RX ADMIN — Medication 100 GRAM(S): at 09:18

## 2022-02-09 RX ADMIN — Medication 650 MILLIGRAM(S): at 13:51

## 2022-02-09 RX ADMIN — Medication 1000 MILLIGRAM(S): at 07:03

## 2022-02-09 RX ADMIN — Medication 400 MILLIGRAM(S): at 06:33

## 2022-02-09 RX ADMIN — Medication 650 MILLIGRAM(S): at 14:51

## 2022-02-09 NOTE — PROGRESS NOTE ADULT - SUBJECTIVE AND OBJECTIVE BOX
Procedure: left nipple sparing mastectomy and left SLNB and complex wound closure with plastic surgery   Surgeon: Dr. Elise and Dr. Barry    S: Pt has no complaints. Denies CP, SOB, dizziness, calf tenderness. Pain well controlled with medication.     O:  T(C): 36.7 (02-08-22 @ 21:30), Max: 36.7 (02-08-22 @ 21:30)  T(F): 98.1 (02-08-22 @ 21:30), Max: 98.1 (02-08-22 @ 21:30)  HR: 74 (02-08-22 @ 21:30) (67 - 89)  BP: 108/68 (02-08-22 @ 21:30) (86/54 - 108/68)  RR: 16 (02-08-22 @ 21:30) (13 - 45)  SpO2: 98% (02-08-22 @ 21:30) (97% - 100%)  Wt(kg): --      Gen: NAD, resting comfortably in bed  Breast: Telfa and tegaderm covering incision, no swelling, no erythema, minimal tenderness to palpation, JAKE -sang  C/V: NSR  Pulm: Nonlabored breathing, no respiratory distress  Abd: soft, NT/ND   Extrem: WWP, no calf edema, SCDs in place      A/P: 51yFemale s/p above procedure  Diet: Regular  IVF: none  Pain/nausea control  DVT ppx: SCDs  Dispo plan: 23 hours obs
SUBJECTIVE:   Patient seen and evaluated. Patient says that she is doing well.       Vital Signs Last 24 Hrs  T(C): 36.8 (09 Feb 2022 04:48), Max: 37.1 (09 Feb 2022 00:29)  T(F): 98.2 (09 Feb 2022 04:48), Max: 98.7 (09 Feb 2022 00:29)  HR: 66 (09 Feb 2022 04:48) (66 - 89)  BP: 110/70 (09 Feb 2022 04:48) (86/54 - 118/78)  BP(mean): 83 (09 Feb 2022 04:48) (64 - 83)  RR: 16 (09 Feb 2022 04:48) (13 - 45)  SpO2: 98% (09 Feb 2022 04:48) (97% - 100%)  I&O's Detail    08 Feb 2022 07:01  -  09 Feb 2022 06:59  --------------------------------------------------------  IN:    Lactated Ringers: 800 mL  Total IN: 800 mL    OUT:    Bulb (mL): 35 mL    Voided (mL): 750 mL  Total OUT: 785 mL    Total NET: 15 mL          General: NAD, resting comfortably in bed  C/V: Normal rate.   Pulm: Nonlabored breathing, no respiratory distress. Speaking in complete sentences.  Chest: L breast surgical bandage in place with no blood or purulence accumulated, mild tenderness to palpation. No underlying hematoma appreciated. JAKE drain in place with serosanguinous (sang > ser) fluid collected.   Abd: no obvious abdominal distention  Extrem: WWP, no edema, SCDs in place      LABS:                  
SUBJECTIVE:  No overnight events.   Patient did not receive home dose of ambien so was unable to sleep last night.  Pain controlled when lying in bed. She has been doing IS every hour.     OBJECTIVE:     ** VITAL SIGNS / I&O's **    Vital Signs Last 24 Hrs  T(C): 36.8 (09 Feb 2022 04:48), Max: 37.1 (09 Feb 2022 00:29)  T(F): 98.2 (09 Feb 2022 04:48), Max: 98.7 (09 Feb 2022 00:29)  HR: 66 (09 Feb 2022 04:48) (66 - 89)  BP: 110/70 (09 Feb 2022 04:48) (86/54 - 118/78)  BP(mean): 83 (09 Feb 2022 04:48) (64 - 83)  RR: 16 (09 Feb 2022 04:48) (13 - 45)  SpO2: 98% (09 Feb 2022 04:48) (97% - 100%)      08 Feb 2022 07:01  -  09 Feb 2022 07:00  --------------------------------------------------------  IN:    Lactated Ringers: 880 mL  Total IN: 880 mL    OUT:    Bulb (mL): 35 mL    Voided (mL): 950 mL  Total OUT: 985 mL    Total NET: -105 mL          ** PHYSICAL EXAM **    -- CONSTITUTIONAL: Alert, Awake. NAD.   -- RESPIRATORY: unlabored breathing, no respiratory distress  -- CHEST: left chest dressing c/d/i, no collections, sherman serosanguinous      ** LABS **                          11.0   11.16 )-----------( 246      ( 09 Feb 2022 07:14 )             33.9     09 Feb 2022 07:14    137    |  104    |  9      ----------------------------<  95     4.0     |  22     |  x        Ca    8.7        09 Feb 2022 07:14  Phos  3.5       09 Feb 2022 07:14  Mg     1.7       09 Feb 2022 07:14        CAPILLARY BLOOD GLUCOSE

## 2022-02-09 NOTE — DISCHARGE NOTE NURSING/CASE MANAGEMENT/SOCIAL WORK - NSDCPEFALRISK_GEN_ALL_CORE
For information on Fall & Injury Prevention, visit: [email or web address] OR  [email or web address] OR  https://www.cdc.gov/steadi/patient.html

## 2022-02-09 NOTE — PROGRESS NOTE ADULT - ASSESSMENT
52 y/o female s/p L NSM with closure  - continue sherman care  - prn analgesia  - tylenol ATC  - keep dressing in place  - shower and pat dry  - follow up with Dr Barry in office 1-2 weeks
51F PMHx pituitary adenoma, invasive left breast invasive mammary carcinoma (focal mammary carcinoma in situ (ER+, FL+, HER2) now s/p left nipple sparing mastectomy and left SLNB and complex wound closure with plastic surgery (2/8). Patient doing well. No recurrent anaphylaxis.     Regular  Pain/Nausea PRN  JAKE x1  23 hours obs  No HSQ  Dispo: discharge today

## 2022-02-09 NOTE — DISCHARGE NOTE NURSING/CASE MANAGEMENT/SOCIAL WORK - PATIENT PORTAL LINK FT
You can access the FollowMyHealth Patient Portal offered by [facility] by registering at the following website: [email or web address]. By joining [facility]’s FollowMyHealth portal, you will also be able to view your health information using other applications (apps) compatible with our system.

## 2022-02-14 ENCOUNTER — APPOINTMENT (OUTPATIENT)
Dept: PLASTIC SURGERY | Facility: CLINIC | Age: 52
End: 2022-02-14
Payer: MEDICAID

## 2022-02-14 ENCOUNTER — APPOINTMENT (OUTPATIENT)
Dept: BREAST CENTER | Facility: CLINIC | Age: 52
End: 2022-02-14

## 2022-02-14 VITALS — BODY MASS INDEX: 18.2 KG/M2 | WEIGHT: 130 LBS | HEIGHT: 71 IN | OXYGEN SATURATION: 98 % | HEART RATE: 72 BPM

## 2022-02-14 PROCEDURE — 99024 POSTOP FOLLOW-UP VISIT: CPT

## 2022-02-15 ENCOUNTER — NON-APPOINTMENT (OUTPATIENT)
Age: 52
End: 2022-02-15

## 2022-02-15 LAB — SURGICAL PATHOLOGY STUDY: SIGNIFICANT CHANGE UP

## 2022-02-15 NOTE — HISTORY OF PRESENT ILLNESS
[FreeTextEntry1] : 52 y/o female presents for follow up regarding breast reconstruction. Patient had left mastectomy done with Dr. Elise on the 2/8. No reconstruction placed due to quality of skin and poor perfusion. Denies any f/c/n/v. Patient is taking oxycodone for the pain. She has one left brast JAKE drain --> serosang fluid, ready for removal. \par \par PE: Incisions c/d/i, no collections or s/sx of infection, JAKE drain removed\par \par RTCin 2 weeks \par Once she is healed, we will plan for left TE placement.

## 2022-02-17 ENCOUNTER — APPOINTMENT (OUTPATIENT)
Dept: BREAST CENTER | Facility: CLINIC | Age: 52
End: 2022-02-17
Payer: MEDICAID

## 2022-02-17 VITALS
DIASTOLIC BLOOD PRESSURE: 92 MMHG | HEART RATE: 78 BPM | HEIGHT: 71 IN | SYSTOLIC BLOOD PRESSURE: 134 MMHG | OXYGEN SATURATION: 99 % | WEIGHT: 131.19 LBS | TEMPERATURE: 98.2 F | BODY MASS INDEX: 18.37 KG/M2

## 2022-02-17 PROCEDURE — 99024 POSTOP FOLLOW-UP VISIT: CPT

## 2022-02-22 PROCEDURE — 71045 X-RAY EXAM CHEST 1 VIEW: CPT

## 2022-02-22 PROCEDURE — 83520 IMMUNOASSAY QUANT NOS NONAB: CPT

## 2022-02-22 PROCEDURE — 83735 ASSAY OF MAGNESIUM: CPT

## 2022-02-22 PROCEDURE — 84295 ASSAY OF SERUM SODIUM: CPT

## 2022-02-22 PROCEDURE — 85014 HEMATOCRIT: CPT

## 2022-02-22 PROCEDURE — 84100 ASSAY OF PHOSPHORUS: CPT

## 2022-02-22 PROCEDURE — 94002 VENT MGMT INPAT INIT DAY: CPT

## 2022-02-22 PROCEDURE — 82962 GLUCOSE BLOOD TEST: CPT

## 2022-02-22 PROCEDURE — 19301 PARTIAL MASTECTOMY: CPT | Mod: 74,LT

## 2022-02-22 PROCEDURE — 84132 ASSAY OF SERUM POTASSIUM: CPT

## 2022-02-22 PROCEDURE — 36415 COLL VENOUS BLD VENIPUNCTURE: CPT

## 2022-02-22 PROCEDURE — 85027 COMPLETE CBC AUTOMATED: CPT

## 2022-02-22 PROCEDURE — 82947 ASSAY GLUCOSE BLOOD QUANT: CPT

## 2022-02-22 PROCEDURE — 82330 ASSAY OF CALCIUM: CPT

## 2022-02-22 PROCEDURE — 83605 ASSAY OF LACTIC ACID: CPT

## 2022-02-22 PROCEDURE — 82803 BLOOD GASES ANY COMBINATION: CPT

## 2022-02-22 PROCEDURE — 85018 HEMOGLOBIN: CPT

## 2022-02-22 PROCEDURE — 81001 URINALYSIS AUTO W/SCOPE: CPT

## 2022-02-22 PROCEDURE — 88305 TISSUE EXAM BY PATHOLOGIST: CPT

## 2022-02-22 PROCEDURE — 83036 HEMOGLOBIN GLYCOSYLATED A1C: CPT

## 2022-02-22 PROCEDURE — 80048 BASIC METABOLIC PNL TOTAL CA: CPT

## 2022-02-23 ENCOUNTER — APPOINTMENT (OUTPATIENT)
Age: 52
End: 2022-02-23

## 2022-02-23 ENCOUNTER — OUTPATIENT (OUTPATIENT)
Dept: OUTPATIENT SERVICES | Facility: HOSPITAL | Age: 52
LOS: 1 days | End: 2022-02-23
Payer: COMMERCIAL

## 2022-02-23 VITALS
HEIGHT: 71 IN | SYSTOLIC BLOOD PRESSURE: 107 MMHG | RESPIRATION RATE: 17 BRPM | HEART RATE: 88 BPM | DIASTOLIC BLOOD PRESSURE: 74 MMHG | OXYGEN SATURATION: 98 % | TEMPERATURE: 98 F | WEIGHT: 130.07 LBS

## 2022-02-23 DIAGNOSIS — N88.8 OTHER SPECIFIED NONINFLAMMATORY DISORDERS OF CERVIX UTERI: Chronic | ICD-10-CM

## 2022-02-23 DIAGNOSIS — Z86.018 PERSONAL HISTORY OF OTHER BENIGN NEOPLASM: Chronic | ICD-10-CM

## 2022-02-23 DIAGNOSIS — C50.919 MALIGNANT NEOPLASM OF UNSPECIFIED SITE OF UNSPECIFIED FEMALE BREAST: ICD-10-CM

## 2022-02-23 DIAGNOSIS — Z98.82 BREAST IMPLANT STATUS: Chronic | ICD-10-CM

## 2022-02-23 PROCEDURE — 96372 THER/PROPH/DIAG INJ SC/IM: CPT

## 2022-02-23 RX ORDER — GOSERELIN ACETATE 10.8 MG/1
3.6 IMPLANT SUBCUTANEOUS ONCE
Refills: 0 | Status: COMPLETED | OUTPATIENT
Start: 2022-02-23 | End: 2022-02-23

## 2022-02-23 RX ADMIN — GOSERELIN ACETATE 3.6 MILLIGRAM(S): 10.8 IMPLANT SUBCUTANEOUS at 14:50

## 2022-02-24 ENCOUNTER — NON-APPOINTMENT (OUTPATIENT)
Age: 52
End: 2022-02-24

## 2022-02-24 NOTE — HISTORY OF PRESENT ILLNESS
[FreeTextEntry1] : 50 yo female presents for post-operative evaluation; she is s/p left breast mastectomy w/ SLN on 2/8/22 without recon, for left breast invasive mammary carcinoma s/ focal mammary carcinoma in situ (ER+ AR+ HER2 negative). The patient was planned for b/l mastectomy w/ TE recon on 2/1/22, with anaphylactic reaction. Final surgical path from 2/8/22 revealed IDC with 1/2 LN positive. Denies pain, fever, drainage or redness around the incision and is without complaint. \par \par Discussed the positive node and that Oncotype DX is pending. Patient understands. She also understands she made need chemotherapy and radiation at this point.\par \par She has an appt with allergist. After cleared will plan for completion of her surgery, right nipple sparing mastectomy and right SLN, with bilateral recon by Dr. Barry

## 2022-02-24 NOTE — PHYSICAL EXAM
[Normocephalic] : normocephalic [Atraumatic] : atraumatic [Supple] : supple [No Supraclavicular Adenopathy] : no supraclavicular adenopathy [Examined in the supine and seated position] : examined in the supine and seated position [No dominant masses] : no dominant masses in right breast  [No Nipple Retraction] : no left nipple retraction [No Nipple Discharge] : no left nipple discharge [No Axillary Lymphadenopathy] : no left axillary lymphadenopathy [No Edema] : no edema [No Rashes] : no rashes [No Ulceration] : no ulceration [de-identified] : left mastectomy flap healing well, no hematoma, seroma or signs of infection

## 2022-02-28 ENCOUNTER — APPOINTMENT (OUTPATIENT)
Dept: PLASTIC SURGERY | Facility: CLINIC | Age: 52
End: 2022-02-28
Payer: MEDICAID

## 2022-02-28 PROCEDURE — 99024 POSTOP FOLLOW-UP VISIT: CPT

## 2022-03-01 NOTE — SURGICAL HISTORY
[de-identified] : 01/13/22; bilateral tissue expander breast reconstruction [de-identified] : 02/08/22; Mastectomy

## 2022-03-01 NOTE — HISTORY OF PRESENT ILLNESS
[FreeTextEntry1] : 52 y/o female presents for follow up regarding breast reconstruction. Patient had left mastectomy done with Dr. Elise on the 2/8. No reconstruction placed due to quality of skin and poor perfusion. Denies any f/c/n/v. Patient takes OTC tylenol for heaches prn.\par \par PE: Incisions c/d/i, no collections or s/sx of infection\par \par Will plan for right mastectomy and b/l TE placement in the near future.\par Will coordinate with Dr. Elise.

## 2022-03-02 ENCOUNTER — NON-APPOINTMENT (OUTPATIENT)
Age: 52
End: 2022-03-02

## 2022-03-02 ENCOUNTER — APPOINTMENT (OUTPATIENT)
Dept: HEMATOLOGY ONCOLOGY | Facility: CLINIC | Age: 52
End: 2022-03-02
Payer: MEDICAID

## 2022-03-02 VITALS
DIASTOLIC BLOOD PRESSURE: 84 MMHG | BODY MASS INDEX: 18.06 KG/M2 | TEMPERATURE: 98.5 F | SYSTOLIC BLOOD PRESSURE: 129 MMHG | OXYGEN SATURATION: 99 % | RESPIRATION RATE: 18 BRPM | HEIGHT: 71 IN | WEIGHT: 129 LBS | HEART RATE: 86 BPM

## 2022-03-02 PROCEDURE — 99215 OFFICE O/P EST HI 40 MIN: CPT | Mod: 25
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
## 2022-03-02 NOTE — PHYSICAL EXAM
[Fully active, able to carry on all pre-disease performance without restriction] : Status 0 - Fully active, able to carry on all pre-disease performance without restriction [Normal] : affect appropriate [de-identified] : left sided mastectomy

## 2022-03-02 NOTE — ASSESSMENT
[FreeTextEntry1] : 50 yo premenopausal Polish woman ( fluent in English and Polish) referred by Dr. Elise for evaluation of left breast cancer.   Biopsy revealed IDC ESTROGEN RECEPTOR: POSITIVE 45% NUCLEAR STAINING WITH WEAK/MODERATE 1+/2+ INTENSITY PROGESTERONE RECEPTOR: POSITIVE 30% NUCLEAR STAINING WITH MODERATE 2+ INTENSITY HER-2: NEGATIVE (0 MEMBRANOUS STAINING) KI 67 35 %.\par \par Imaging studies with left breast mass in US 3.9 x 1.1 x 1.2 cm, MRI max dimension 5.3 cm.\par Right breast lesion - biopsy unsuccessful.\par \par Patient decided on bilateral mastectomy.  She understand indication for systemic chemotherapy based on lymph node status and molecular testing. 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 Reviewed protocols of chemotherapy including ACdd> Tweekly, TC and CMF with the last one with lowest incidence of hair loss.\par Reviewed options for scalp cooling to decrease hair loss. \par Reviewed with patient indication for radiation therapy if tumor over 5 cm or LN positive.\par Plan for endocrine treatment with or without CDK 4/6 based on the final pathology.\par \par Mammaprint results c/w low recurrence score, reviewed with patient if LN positive given the fact that she is premenopausal she will have indication for adjuvant chemotherapy. If LN negative plan for AI. \par Zoladex 3.6 mg today, reviewed side effects. \par \par Patient underwent left sided mastectomy - pathology report reviewed, IDC 4 cm, T2, N1, with LVI.\par Reviewed with patient high risk features and benefit from chemotherapy based of RxPonder data.  \par Reviewed with patient options for chemotherapy with Acdd> Tw.  Side effects and schema as well as need for Mediport placement and ECHO prior to Adriamycin reviewed with patient.  \par She is scheduled for right sided mastectomy- will follow pathology from right breast. \par AC dd q 2 weeks\par Adriamycin 60 mg /m2 x 1.75 = 105 mg over 30 min\par Cytoxan 600 mg /m2  x 1.75= 1050  mg over 30 min\par Neulasta onpro 6 mg SQ x 4 q 2 weeks\par Emend 150 mg IVSS\par Aloxi 0.25 IVP\par Dexamethasone 12 mg IVSS\par \par followed by \par \par Taxol 80 mg/m2 =140 mg over 60 min\par Benadryl 50 mg IV\par Famotidine 20mg  IV\par Dexamethasone 12 mg IVSS\par Plan for mastectomy followed by chemotherapy reviewed with Dr. Monteiro.\par Labs ordered and reviewed.

## 2022-03-03 LAB
ALBUMIN SERPL ELPH-MCNC: 4.4 G/DL
ALP BLD-CCNC: 61 U/L
ALT SERPL-CCNC: 16 U/L
ANION GAP SERPL CALC-SCNC: 18 MMOL/L
AST SERPL-CCNC: 18 U/L
BASOPHILS # BLD AUTO: 0.04 K/UL
BASOPHILS NFR BLD AUTO: 0.9 %
BILIRUB SERPL-MCNC: 0.3 MG/DL
BUN SERPL-MCNC: 8 MG/DL
CALCIUM SERPL-MCNC: 9.9 MG/DL
CANCER AG27-29 SERPL-ACNC: 22 U/ML
CEA SERPL-MCNC: 0.9 NG/ML
CHLORIDE SERPL-SCNC: 102 MMOL/L
CO2 SERPL-SCNC: 22 MMOL/L
CREAT SERPL-MCNC: 0.47 MG/DL
CRP SERPL-MCNC: <3 MG/L
EGFR: 114 ML/MIN/1.73M2
EOSINOPHIL # BLD AUTO: 0.07 K/UL
EOSINOPHIL NFR BLD AUTO: 1.6 %
ERYTHROCYTE [SEDIMENTATION RATE] IN BLOOD BY WESTERGREN METHOD: 11 MM/HR
FERRITIN SERPL-MCNC: 84 NG/ML
GLUCOSE SERPL-MCNC: 47 MG/DL
HCT VFR BLD CALC: 40.9 %
HGB BLD-MCNC: 13.4 G/DL
IMM GRANULOCYTES NFR BLD AUTO: 0.5 %
IRON SATN MFR SERPL: 29 %
IRON SERPL-MCNC: 95 UG/DL
LDH SERPL-CCNC: 205 U/L
LYMPHOCYTES # BLD AUTO: 0.83 K/UL
LYMPHOCYTES NFR BLD AUTO: 19.5 %
MAGNESIUM SERPL-MCNC: 1.9 MG/DL
MAN DIFF?: NORMAL
MCHC RBC-ENTMCNC: 31.2 PG
MCHC RBC-ENTMCNC: 32.8 GM/DL
MCV RBC AUTO: 95.3 FL
MONOCYTES # BLD AUTO: 0.44 K/UL
MONOCYTES NFR BLD AUTO: 10.3 %
NEUTROPHILS # BLD AUTO: 2.86 K/UL
NEUTROPHILS NFR BLD AUTO: 67.2 %
PHOSPHATE SERPL-MCNC: 3.5 MG/DL
PLATELET # BLD AUTO: 234 K/UL
POTASSIUM SERPL-SCNC: 3.8 MMOL/L
PROT SERPL-MCNC: 7.5 G/DL
RBC # BLD: 4.29 M/UL
RBC # FLD: 12.6 %
SODIUM SERPL-SCNC: 142 MMOL/L
TIBC SERPL-MCNC: 329 UG/DL
UIBC SERPL-MCNC: 235 UG/DL
URATE SERPL-MCNC: 5 MG/DL
WBC # FLD AUTO: 4.26 K/UL

## 2022-03-04 ENCOUNTER — APPOINTMENT (OUTPATIENT)
Dept: PLASTIC SURGERY | Facility: CLINIC | Age: 52
End: 2022-03-04

## 2022-03-08 ENCOUNTER — NON-APPOINTMENT (OUTPATIENT)
Age: 52
End: 2022-03-08

## 2022-03-08 ENCOUNTER — LABORATORY RESULT (OUTPATIENT)
Age: 52
End: 2022-03-08

## 2022-03-08 DIAGNOSIS — Z01.818 ENCOUNTER FOR OTHER PREPROCEDURAL EXAMINATION: ICD-10-CM

## 2022-03-08 RX ORDER — FAMOTIDINE 20 MG/1
20 TABLET, FILM COATED ORAL
Qty: 1 | Refills: 0 | Status: COMPLETED | COMMUNITY
Start: 2022-02-03 | End: 2022-03-09

## 2022-03-08 RX ORDER — DEXAMETHASONE 1 MG/1
1 TABLET ORAL
Qty: 1 | Refills: 0 | Status: COMPLETED | COMMUNITY
Start: 2022-02-03 | End: 2022-03-09

## 2022-03-08 RX ORDER — DIPHENHYDRAMINE HCL 25 MG/1
25 CAPSULE ORAL
Qty: 1 | Refills: 0 | Status: COMPLETED | COMMUNITY
Start: 2022-02-03 | End: 2022-03-09

## 2022-03-08 RX ORDER — LORATADINE 10 MG/1
10 TABLET ORAL
Qty: 1 | Refills: 0 | Status: COMPLETED | COMMUNITY
Start: 2022-02-03 | End: 2022-03-09

## 2022-03-09 ENCOUNTER — OUTPATIENT (OUTPATIENT)
Dept: OUTPATIENT SERVICES | Facility: HOSPITAL | Age: 52
LOS: 1 days | End: 2022-03-09
Payer: COMMERCIAL

## 2022-03-09 ENCOUNTER — RESULT REVIEW (OUTPATIENT)
Age: 52
End: 2022-03-09

## 2022-03-09 ENCOUNTER — NON-APPOINTMENT (OUTPATIENT)
Age: 52
End: 2022-03-09

## 2022-03-09 ENCOUNTER — TRANSCRIPTION ENCOUNTER (OUTPATIENT)
Age: 52
End: 2022-03-09

## 2022-03-09 DIAGNOSIS — N88.8 OTHER SPECIFIED NONINFLAMMATORY DISORDERS OF CERVIX UTERI: Chronic | ICD-10-CM

## 2022-03-09 DIAGNOSIS — Z86.018 PERSONAL HISTORY OF OTHER BENIGN NEOPLASM: Chronic | ICD-10-CM

## 2022-03-09 DIAGNOSIS — Z98.82 BREAST IMPLANT STATUS: Chronic | ICD-10-CM

## 2022-03-09 PROCEDURE — A9541: CPT

## 2022-03-09 PROCEDURE — 78195 LYMPH SYSTEM IMAGING: CPT | Mod: 26

## 2022-03-09 PROCEDURE — 78195 LYMPH SYSTEM IMAGING: CPT

## 2022-03-09 NOTE — ASU PATIENT PROFILE, ADULT - FALL HARM RISK - UNIVERSAL INTERVENTIONS
Bed in lowest position, wheels locked, appropriate side rails in place/Call bell, personal items and telephone in reach/Instruct patient to call for assistance before getting out of bed or chair/[place] to call system/Physically safe environment - no spills, clutter or unnecessary equipment/Purposeful Proactive Rounding/Room/bathroom lighting operational, light cord in reach Bed in lowest position, wheels locked, appropriate side rails in place/Call bell, personal items and telephone in reach/Instruct patient to call for assistance before getting out of bed or chair/Non-slip footwear when patient is out of bed/[place] to call system/Physically safe environment - no spills, clutter or unnecessary equipment/Purposeful Proactive Rounding/Room/bathroom lighting operational, light cord in reach

## 2022-03-09 NOTE — ASU PATIENT PROFILE, ADULT - NSICDXPASTSURGICALHX_GEN_ALL_CORE_FT
PAST SURGICAL HISTORY:  Cervical mass removal    H/O breast implant     History of benign breast tumor removal right     PAST SURGICAL HISTORY:  Cervical mass removal    H/O breast implant bilat    History of benign breast tumor removal left

## 2022-03-10 ENCOUNTER — RESULT REVIEW (OUTPATIENT)
Age: 52
End: 2022-03-10

## 2022-03-10 ENCOUNTER — INPATIENT (INPATIENT)
Facility: HOSPITAL | Age: 52
LOS: 2 days | Discharge: HOME HEALTH | DRG: 580 | End: 2022-03-13
Attending: SURGERY | Admitting: SURGERY
Payer: COMMERCIAL

## 2022-03-10 ENCOUNTER — APPOINTMENT (OUTPATIENT)
Dept: BREAST CENTER | Facility: CLINIC | Age: 52
End: 2022-03-10

## 2022-03-10 ENCOUNTER — TRANSCRIPTION ENCOUNTER (OUTPATIENT)
Age: 52
End: 2022-03-10

## 2022-03-10 VITALS
OXYGEN SATURATION: 100 % | TEMPERATURE: 98 F | WEIGHT: 128.97 LBS | DIASTOLIC BLOOD PRESSURE: 86 MMHG | HEART RATE: 80 BPM | HEIGHT: 71 IN | RESPIRATION RATE: 16 BRPM | SYSTOLIC BLOOD PRESSURE: 125 MMHG

## 2022-03-10 DIAGNOSIS — C50.919 MALIGNANT NEOPLASM OF UNSPECIFIED SITE OF UNSPECIFIED FEMALE BREAST: ICD-10-CM

## 2022-03-10 DIAGNOSIS — Z85.3 PERSONAL HISTORY OF MALIGNANT NEOPLASM OF BREAST: ICD-10-CM

## 2022-03-10 DIAGNOSIS — Z91.018 ALLERGY TO OTHER FOODS: ICD-10-CM
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
## 2022-03-10 DIAGNOSIS — Z98.82 BREAST IMPLANT STATUS: ICD-10-CM
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
## 2022-03-10 DIAGNOSIS — Z86.018 PERSONAL HISTORY OF OTHER BENIGN NEOPLASM: Chronic | ICD-10-CM

## 2022-03-10 DIAGNOSIS — Z91.410 PERSONAL HISTORY OF ADULT PHYSICAL AND SEXUAL ABUSE: ICD-10-CM

## 2022-03-10 DIAGNOSIS — Z17.0 ESTROGEN RECEPTOR POSITIVE STATUS [ER+]: ICD-10-CM

## 2022-03-10 DIAGNOSIS — Z45.811 ENCOUNTER FOR ADJUSTMENT OR REMOVAL OF RIGHT BREAST IMPLANT: ICD-10-CM

## 2022-03-10 DIAGNOSIS — Z90.12 ACQUIRED ABSENCE OF LEFT BREAST AND NIPPLE: ICD-10-CM

## 2022-03-10 DIAGNOSIS — M96.841 POSTPROCEDURAL HEMATOMA OF A MUSCULOSKELETAL STRUCTURE FOLLOWING OTHER PROCEDURE: ICD-10-CM

## 2022-03-10 DIAGNOSIS — Z86.16 PERSONAL HISTORY OF COVID-19: ICD-10-CM
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
## 2022-03-10 DIAGNOSIS — F41.9 ANXIETY DISORDER, UNSPECIFIED: ICD-10-CM

## 2022-03-10 DIAGNOSIS — Z98.82 BREAST IMPLANT STATUS: Chronic | ICD-10-CM

## 2022-03-10 DIAGNOSIS — D35.2 BENIGN NEOPLASM OF PITUITARY GLAND: ICD-10-CM

## 2022-03-10 DIAGNOSIS — Z42.1 ENCOUNTER FOR BREAST RECONSTRUCTION FOLLOWING MASTECTOMY: ICD-10-CM

## 2022-03-10 DIAGNOSIS — R63.6 UNDERWEIGHT: ICD-10-CM

## 2022-03-10 DIAGNOSIS — N88.8 OTHER SPECIFIED NONINFLAMMATORY DISORDERS OF CERVIX UTERI: Chronic | ICD-10-CM

## 2022-03-10 DIAGNOSIS — Z91.041 RADIOGRAPHIC DYE ALLERGY STATUS: ICD-10-CM

## 2022-03-10 DIAGNOSIS — D05.92 UNSPECIFIED TYPE OF CARCINOMA IN SITU OF LEFT BREAST: ICD-10-CM

## 2022-03-10 PROCEDURE — 88305 TISSUE EXAM BY PATHOLOGIST: CPT | Mod: 26

## 2022-03-10 PROCEDURE — 38900 IO MAP OF SENT LYMPH NODE: CPT | Mod: RT,79

## 2022-03-10 PROCEDURE — 38525 BIOPSY/REMOVAL LYMPH NODES: CPT | Mod: RT,79

## 2022-03-10 PROCEDURE — 19303 MAST SIMPLE COMPLETE: CPT | Mod: RT,79

## 2022-03-10 PROCEDURE — 88307 TISSUE EXAM BY PATHOLOGIST: CPT | Mod: 26

## 2022-03-10 PROCEDURE — 19357 TISS XPNDR PLMT BRST RCNSTJ: CPT | Mod: 50

## 2022-03-10 DEVICE — XPND TISSUE BREAST SMOOTH MOD VAR PROJ 500CC: Type: IMPLANTABLE DEVICE | Status: FUNCTIONAL

## 2022-03-10 RX ORDER — DIPHENHYDRAMINE HCL 50 MG
25 CAPSULE ORAL ONCE
Refills: 0 | Status: COMPLETED | OUTPATIENT
Start: 2022-03-10 | End: 2022-03-10

## 2022-03-10 RX ORDER — DIAZEPAM 5 MG
5 TABLET ORAL EVERY 8 HOURS
Refills: 0 | Status: DISCONTINUED | OUTPATIENT
Start: 2022-03-10 | End: 2022-03-11

## 2022-03-10 RX ORDER — OXYCODONE HYDROCHLORIDE 5 MG/1
5 TABLET ORAL EVERY 6 HOURS
Refills: 0 | Status: DISCONTINUED | OUTPATIENT
Start: 2022-03-10 | End: 2022-03-11

## 2022-03-10 RX ORDER — FAMOTIDINE 10 MG/ML
20 INJECTION INTRAVENOUS ONCE
Refills: 0 | Status: COMPLETED | OUTPATIENT
Start: 2022-03-10 | End: 2022-03-10

## 2022-03-10 RX ORDER — KETOROLAC TROMETHAMINE 30 MG/ML
30 SYRINGE (ML) INJECTION EVERY 6 HOURS
Refills: 0 | Status: DISCONTINUED | OUTPATIENT
Start: 2022-03-10 | End: 2022-03-11

## 2022-03-10 RX ORDER — BUPIVACAINE 13.3 MG/ML
20 INJECTION, SUSPENSION, LIPOSOMAL INFILTRATION ONCE
Refills: 0 | Status: DISCONTINUED | OUTPATIENT
Start: 2022-03-10 | End: 2022-03-11

## 2022-03-10 RX ORDER — SODIUM CHLORIDE 9 MG/ML
1000 INJECTION, SOLUTION INTRAVENOUS
Refills: 0 | Status: DISCONTINUED | OUTPATIENT
Start: 2022-03-10 | End: 2022-03-13

## 2022-03-10 RX ORDER — DEXAMETHASONE 0.5 MG/5ML
6 ELIXIR ORAL ONCE
Refills: 0 | Status: DISCONTINUED | OUTPATIENT
Start: 2022-03-10 | End: 2022-03-10

## 2022-03-10 RX ORDER — DEXAMETHASONE 0.5 MG/5ML
10 ELIXIR ORAL ONCE
Refills: 0 | Status: DISCONTINUED | OUTPATIENT
Start: 2022-03-10 | End: 2022-03-10

## 2022-03-10 RX ORDER — ACETAMINOPHEN 500 MG
650 TABLET ORAL EVERY 6 HOURS
Refills: 0 | Status: DISCONTINUED | OUTPATIENT
Start: 2022-03-10 | End: 2022-03-11

## 2022-03-10 RX ADMIN — Medication 30 MILLIGRAM(S): at 23:21

## 2022-03-10 RX ADMIN — Medication 650 MILLIGRAM(S): at 23:21

## 2022-03-10 RX ADMIN — FAMOTIDINE 20 MILLIGRAM(S): 10 INJECTION INTRAVENOUS at 13:10

## 2022-03-10 RX ADMIN — Medication 30 MILLIGRAM(S): at 20:10

## 2022-03-10 RX ADMIN — Medication 30 MILLIGRAM(S): at 23:30

## 2022-03-10 RX ADMIN — Medication 25 MILLIGRAM(S): at 13:10

## 2022-03-10 RX ADMIN — Medication 650 MILLIGRAM(S): at 23:50

## 2022-03-10 RX ADMIN — Medication 5 MILLIGRAM(S): at 23:21

## 2022-03-10 RX ADMIN — Medication 30 MILLIGRAM(S): at 19:37
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
## 2022-03-10 NOTE — H&P ADULT - NSICDXPASTSURGICALHX_GEN_ALL_CORE_FT
PAST SURGICAL HISTORY:  Cervical mass removal    H/O breast implant bilat    History of benign breast tumor removal left

## 2022-03-10 NOTE — PROGRESS NOTE ADULT - SUBJECTIVE AND OBJECTIVE BOX
Procedure: Nipple sparing mastectomy of right breast    Mastectomy with insertion of tissue expander    Surgeon: Sid Elise and Dec    Subjective: Pt doing well in the post op period. Denies n/v. Denies cp/sob. Denies f/bm. Denies f/c.    Vital Signs Last 24 Hrs  T(C): 36.7 (10 Mar 2022 20:56), Max: 36.8 (10 Mar 2022 12:20)  T(F): 98 (10 Mar 2022 20:56), Max: 98.2 (10 Mar 2022 12:20)  HR: 94 (10 Mar 2022 20:56) (68 - 96)  BP: 115/63 (10 Mar 2022 20:56) (114/62 - 134/72)  BP(mean): 84 (10 Mar 2022 20:56) (82 - 95)  RR: 18 (10 Mar 2022 20:56) (15 - 25)  SpO2: 98% (10 Mar 2022 20:56) (98% - 100%)    Physical Exam:  General: NAD, resting comfortably in bed  Pulmonary: Nonlabored breathing, no respiratory distress  Breast: appropriately tender, incision w/ overlying gauze clean/dry/intact  Cardiovascular: NSR  Abdominal: soft, nontender, nondistended  Extremities: WWP, normal strength  Neuro: A/O x 3, CNs II-XII grossly intact, no focal deficits      Assessment:51y Female s/p R mastectomy w/ TE    Plan:  Pain/nausea PRN  Home meds as appropriate  IS/OOBA  Reg diet  SCDs  AM labs
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
## 2022-03-10 NOTE — H&P ADULT - NSHPPHYSICALEXAM_GEN_ALL_CORE
Vital Signs Last 24 Hrs  T(C): 36.8 (10 Mar 2022 12:20), Max: 36.8 (10 Mar 2022 12:20)  T(F): 98.2 (10 Mar 2022 12:20), Max: 98.2 (10 Mar 2022 12:20)  HR: 80 (10 Mar 2022 12:20) (80 - 80)  BP: 125/86 (10 Mar 2022 12:20) (125/86 - 125/86)  BP(mean): --  RR: 16 (10 Mar 2022 12:20) (16 - 16)  SpO2: 100% (10 Mar 2022 12:20) (100% - 100%)    General: AAOx3, NAD, WDWN, laying comfortably in bed  Cardio: S1,S2, No MRG, RRR  Pulm: Lungs bilaterally clear to auscultation  Breast: S/p left mastectomy, incisions well healed.   Abdomen: soft, NTND, no rebound, no guarding  Extremities: WWP, peripheral pulses appreciated

## 2022-03-11 ENCOUNTER — TRANSCRIPTION ENCOUNTER (OUTPATIENT)
Age: 52
End: 2022-03-11

## 2022-03-11 LAB
ANION GAP SERPL CALC-SCNC: 10 MMOL/L — SIGNIFICANT CHANGE UP (ref 5–17)
APTT BLD: 30.4 SEC — SIGNIFICANT CHANGE UP (ref 27.5–35.5)
BLD GP AB SCN SERPL QL: NEGATIVE — SIGNIFICANT CHANGE UP
BUN SERPL-MCNC: 9 MG/DL — SIGNIFICANT CHANGE UP (ref 7–23)
CALCIUM SERPL-MCNC: 8.7 MG/DL — SIGNIFICANT CHANGE UP (ref 8.4–10.5)
CHLORIDE SERPL-SCNC: 105 MMOL/L — SIGNIFICANT CHANGE UP (ref 96–108)
CO2 SERPL-SCNC: 22 MMOL/L — SIGNIFICANT CHANGE UP (ref 22–31)
CREAT SERPL-MCNC: 0.43 MG/DL — LOW (ref 0.5–1.3)
EGFR: 117 ML/MIN/1.73M2 — SIGNIFICANT CHANGE UP
GLUCOSE SERPL-MCNC: 169 MG/DL — HIGH (ref 70–99)
HCG UR QL: NEGATIVE — SIGNIFICANT CHANGE UP
HCT VFR BLD CALC: 31 % — LOW (ref 34.5–45)
HCT VFR BLD CALC: 32.1 % — LOW (ref 34.5–45)
HGB BLD-MCNC: 10 G/DL — LOW (ref 11.5–15.5)
HGB BLD-MCNC: 10.3 G/DL — LOW (ref 11.5–15.5)
INR BLD: 1.09 — SIGNIFICANT CHANGE UP (ref 0.88–1.16)
MAGNESIUM SERPL-MCNC: 1.7 MG/DL — SIGNIFICANT CHANGE UP (ref 1.6–2.6)
MCHC RBC-ENTMCNC: 30.9 PG — SIGNIFICANT CHANGE UP (ref 27–34)
MCHC RBC-ENTMCNC: 31.5 PG — SIGNIFICANT CHANGE UP (ref 27–34)
MCHC RBC-ENTMCNC: 32.1 GM/DL — SIGNIFICANT CHANGE UP (ref 32–36)
MCHC RBC-ENTMCNC: 32.3 GM/DL — SIGNIFICANT CHANGE UP (ref 32–36)
MCV RBC AUTO: 96.4 FL — SIGNIFICANT CHANGE UP (ref 80–100)
MCV RBC AUTO: 97.8 FL — SIGNIFICANT CHANGE UP (ref 80–100)
NRBC # BLD: 0 /100 WBCS — SIGNIFICANT CHANGE UP (ref 0–0)
NRBC # BLD: 0 /100 WBCS — SIGNIFICANT CHANGE UP (ref 0–0)
PHOSPHATE SERPL-MCNC: 4.3 MG/DL — SIGNIFICANT CHANGE UP (ref 2.5–4.5)
PLATELET # BLD AUTO: 187 K/UL — SIGNIFICANT CHANGE UP (ref 150–400)
PLATELET # BLD AUTO: 199 K/UL — SIGNIFICANT CHANGE UP (ref 150–400)
POTASSIUM SERPL-MCNC: 3.8 MMOL/L — SIGNIFICANT CHANGE UP (ref 3.5–5.3)
POTASSIUM SERPL-SCNC: 3.8 MMOL/L — SIGNIFICANT CHANGE UP (ref 3.5–5.3)
PROTHROM AB SERPL-ACNC: 13 SEC — SIGNIFICANT CHANGE UP (ref 10.5–13.4)
RBC # BLD: 3.17 M/UL — LOW (ref 3.8–5.2)
RBC # BLD: 3.33 M/UL — LOW (ref 3.8–5.2)
RBC # FLD: 12.7 % — SIGNIFICANT CHANGE UP (ref 10.3–14.5)
RBC # FLD: 12.9 % — SIGNIFICANT CHANGE UP (ref 10.3–14.5)
RH IG SCN BLD-IMP: POSITIVE — SIGNIFICANT CHANGE UP
SODIUM SERPL-SCNC: 137 MMOL/L — SIGNIFICANT CHANGE UP (ref 135–145)
WBC # BLD: 7.04 K/UL — SIGNIFICANT CHANGE UP (ref 3.8–10.5)
WBC # BLD: 9.94 K/UL — SIGNIFICANT CHANGE UP (ref 3.8–10.5)
WBC # FLD AUTO: 7.04 K/UL — SIGNIFICANT CHANGE UP (ref 3.8–10.5)
WBC # FLD AUTO: 9.94 K/UL — SIGNIFICANT CHANGE UP (ref 3.8–10.5)

## 2022-03-11 DEVICE — ARISTA 3GR: Type: IMPLANTABLE DEVICE | Status: FUNCTIONAL

## 2022-03-11 RX ORDER — CEFAZOLIN SODIUM 1 G
2000 VIAL (EA) INJECTION ONCE
Refills: 0 | Status: COMPLETED | OUTPATIENT
Start: 2022-03-11 | End: 2022-03-11

## 2022-03-11 RX ORDER — MAGNESIUM SULFATE 500 MG/ML
1 VIAL (ML) INJECTION ONCE
Refills: 0 | Status: COMPLETED | OUTPATIENT
Start: 2022-03-11 | End: 2022-03-11

## 2022-03-11 RX ORDER — OXYCODONE HYDROCHLORIDE 5 MG/1
5 TABLET ORAL EVERY 6 HOURS
Refills: 0 | Status: DISCONTINUED | OUTPATIENT
Start: 2022-03-11 | End: 2022-03-13

## 2022-03-11 RX ORDER — DIAZEPAM 5 MG
5 TABLET ORAL EVERY 8 HOURS
Refills: 0 | Status: DISCONTINUED | OUTPATIENT
Start: 2022-03-11 | End: 2022-03-13

## 2022-03-11 RX ORDER — ENOXAPARIN SODIUM 100 MG/ML
40 INJECTION SUBCUTANEOUS EVERY 24 HOURS
Refills: 0 | Status: DISCONTINUED | OUTPATIENT
Start: 2022-03-11 | End: 2022-03-11

## 2022-03-11 RX ORDER — POTASSIUM CHLORIDE 20 MEQ
40 PACKET (EA) ORAL ONCE
Refills: 0 | Status: COMPLETED | OUTPATIENT
Start: 2022-03-11 | End: 2022-03-11

## 2022-03-11 RX ORDER — CEFAZOLIN SODIUM 1 G
2000 VIAL (EA) INJECTION EVERY 8 HOURS
Refills: 0 | Status: DISCONTINUED | OUTPATIENT
Start: 2022-03-11 | End: 2022-03-11

## 2022-03-11 RX ORDER — CEFAZOLIN SODIUM 1 G
VIAL (EA) INJECTION
Refills: 0 | Status: DISCONTINUED | OUTPATIENT
Start: 2022-03-11 | End: 2022-03-11

## 2022-03-11 RX ORDER — SODIUM CHLORIDE 9 MG/ML
500 INJECTION, SOLUTION INTRAVENOUS ONCE
Refills: 0 | Status: DISCONTINUED | OUTPATIENT
Start: 2022-03-11 | End: 2022-03-11

## 2022-03-11 RX ORDER — ACETAMINOPHEN 500 MG
650 TABLET ORAL EVERY 6 HOURS
Refills: 0 | Status: DISCONTINUED | OUTPATIENT
Start: 2022-03-12 | End: 2022-03-13

## 2022-03-11 RX ADMIN — Medication 650 MILLIGRAM(S): at 06:30

## 2022-03-11 RX ADMIN — ENOXAPARIN SODIUM 40 MILLIGRAM(S): 100 INJECTION SUBCUTANEOUS at 09:26

## 2022-03-11 RX ADMIN — Medication 650 MILLIGRAM(S): at 12:01

## 2022-03-11 RX ADMIN — Medication 5 MILLIGRAM(S): at 06:00

## 2022-03-11 RX ADMIN — Medication 30 MILLIGRAM(S): at 04:29

## 2022-03-11 RX ADMIN — Medication 30 MILLIGRAM(S): at 11:30

## 2022-03-11 RX ADMIN — Medication 650 MILLIGRAM(S): at 06:00

## 2022-03-11 RX ADMIN — Medication 650 MILLIGRAM(S): at 12:30

## 2022-03-11 RX ADMIN — Medication 5 MILLIGRAM(S): at 15:21

## 2022-03-11 RX ADMIN — Medication 100 MILLIGRAM(S): at 09:26

## 2022-03-11 RX ADMIN — Medication 40 MILLIEQUIVALENT(S): at 12:01

## 2022-03-11 RX ADMIN — Medication 30 MILLIGRAM(S): at 11:12

## 2022-03-11 RX ADMIN — Medication 30 MILLIGRAM(S): at 04:45

## 2022-03-11 RX ADMIN — SODIUM CHLORIDE 100 MILLILITER(S): 9 INJECTION, SOLUTION INTRAVENOUS at 04:29

## 2022-03-11 RX ADMIN — Medication 100 GRAM(S): at 12:01

## 2022-03-11 RX ADMIN — Medication 100 MILLIGRAM(S): at 17:01

## 2022-03-11 RX ADMIN — Medication 5 MILLIGRAM(S): at 22:49

## 2022-03-11 NOTE — DIETITIAN NUTRITION RISK NOTIFICATION - TREATMENT: THE FOLLOWING DIET HAS BEEN RECOMMENDED
Diet, Regular:   Supplement Feeding Modality:  Oral  Ensure Enlive Cans or Servings Per Day:  1       Frequency:  Three Times a day (03-11-22 @ 08:00) [Active]

## 2022-03-11 NOTE — DIETITIAN INITIAL EVALUATION ADULT. - PERTINENT LABORATORY DATA
Sodium, Serum: 137 mmol/L  Potassium, Serum: 3.8 mmol/L  Chloride, Serum: 105 mmol/L  BUN, Serum: 9 mg/dL  Creatinine, Serum: 0.43 mg/dL (Low)  Glucose, Serum: 169 mg/dL (Elevated)  Calcium, Serum: 8.7 mg/dL  Magnesium, Serum: 1.7 mg/dL  Phosphorus, Serum: 4.3 mg/dL    Last HbA1c 4.8% (2/2)

## 2022-03-11 NOTE — CHART NOTE - NSCHARTNOTEFT_GEN_A_CORE
Alerted by primary team to persistently high sanguinous JAKE drainage, >50cc/hr x 12hrs, pt seen and examined by plastics resident, denies N/V/CP/SOB, lightheadedness, or overall change in mental status. HDS + afebrile, maintaining SBP >100mmHg consistent w pre-op baseline, NSR, w appropriate UOP. On physical exam, R breast noted to asymmetrically enlarged w dense ecchymosis superior to surgical incision in IMF extending towads nipple areola complex, non-tender, w/o induration or firmness. Attending plastic surgeon + breast surgeon made aware of findings.     A/P:   f/u stat labs   consent + add on for RTOR class 2, R breast washout w breast surgeon team
Throughout day patient was having large amounts of sanguinous drain output from R breast JAKE (360 overnight, 345 during day). She was hemodynamically stable throughout this time with AM Hgb 10.3 from 13.4 pre-op, and repeat of 10.0 at 430PM. Her UOP during this time was 0.6 cc/kg/hr. She was taken to the OR where hematoma was evacuated w/ venous oozing. Patient brought to PACU post-op stable.

## 2022-03-11 NOTE — DIETITIAN INITIAL EVALUATION ADULT. - ADD RECOMMEND
1. Continue with current diet order 2. Encourage pt to meet nutritional needs as able 3. Encourage adherence to diet education (reinforce as able) 4. Pain and bowel regimen per team 5. Will assess/honor preferences as able
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
## 2022-03-11 NOTE — DIETITIAN INITIAL EVALUATION ADULT. - OTHER CALCULATIONS
Based on Standards of Care pt within % IBW thus actual body weight used for all calculations. Needs adjusted for post op/CA and plan for chemo/radiation.

## 2022-03-11 NOTE — PROGRESS NOTE ADULT - SUBJECTIVE AND OBJECTIVE BOX
Procedure: Washout, wound, chest    Removal of breast tissue expander    Surgeon: Dr. Elise, Dr. Ruelas    Subjective: Pt doing well in the post op period. Denies n/v. Denies cp/sob. Denies f/bm. Denies f/c.    Vital Signs Last 24 Hrs  T(C): 37 (11 Mar 2022 22:48), Max: 37.1 (11 Mar 2022 20:17)  T(F): 98.6 (11 Mar 2022 22:48), Max: 98.7 (11 Mar 2022 20:17)  HR: 69 (11 Mar 2022 22:48) (66 - 96)  BP: 116/71 (11 Mar 2022 22:48) (92/53 - 124/59)  BP(mean): 74 (11 Mar 2022 22:20) (65 - 85)  RR: 17 (11 Mar 2022 22:48) (16 - 18)  SpO2: 98% (11 Mar 2022 22:48) (95% - 100%)    Physical Exam:  General: NAD, resting comfortably in bed  Pulmonary: Nonlabored breathing, no respiratory distress  R Breast: appropriately tender, incision clean/dry/intact, JAKE w/ minimal output  L breast: appropriately tender, incisions clean/dry/intact, JAKE w/ serosang output  Cardiovascular: NSR  Abdominal: soft, nontender, nondistended  Extremities: WWP, normal strength  Neuro: A/O x 3, CNs II-XII grossly intact, no focal deficits    LABS:                        10.0   7.04  )-----------( 187      ( 11 Mar 2022 16:26 )             31.0     03-11    137  |  105  |  9   ----------------------------<  169<H>  3.8   |  22  |  0.43<L>    Ca    8.7      11 Mar 2022 05:54  Phos  4.3     03-11  Mg     1.7     03-11      PT/INR - ( 11 Mar 2022 16:26 )   PT: 13.0 sec;   INR: 1.09          PTT - ( 11 Mar 2022 16:26 )  PTT:30.4 sec  CAPILLARY BLOOD GLUCOSE            ABO Interpretation: O (03-11 @ 16:18)      Assessment:51y Female s/p RTOR for hematoma evacuation    Plan:  Pain/nausea PRN  Home meds as appropriate  IS/OOBA  Regular  SCDs/hold SQL

## 2022-03-11 NOTE — PROGRESS NOTE ADULT - SUBJECTIVE AND OBJECTIVE BOX
POD1 S/P Right mastectomy w/ SLNBx w/ b/l tissue expanders    SUBJECTIVE: Patient visited with the surgery team multiple times today (7am, 12pm, and 4pm). Was notified by the nurse about continued sanguinous drainage from right JAKE (190cc since morning by noon and 300cc by 4pm). Is lying comfortably in bed and has no complaints. Has no pain over the mastectomy sites. Denies fever, chills, dizziness, light-headedness, palpitation, SOB, CP, or pain in extremities.     MEDICATIONS  (STANDING):  acetaminophen     Tablet .. 650 milliGRAM(s) Oral every 6 hours  BUpivacaine liposome 1.3% Injectable (no eMAR) 20 milliLiter(s) Local Injection Once  ceFAZolin   IVPB      ceFAZolin   IVPB 2000 milliGRAM(s) IV Intermittent every 8 hours  diazepam    Tablet 5 milliGRAM(s) Oral every 8 hours  enoxaparin Injectable 40 milliGRAM(s) SubCutaneous every 24 hours  lactated ringers. 1000 milliLiter(s) (100 mL/Hr) IV Continuous <Continuous>    MEDICATIONS  (PRN):  oxyCODONE    IR 5 milliGRAM(s) Oral every 6 hours PRN Severe Pain (7 - 10)      Vital Signs Last 24 Hrs  T(C): 36.4 (11 Mar 2022 17:13), Max: 36.9 (10 Mar 2022 21:39)  T(F): 97.5 (11 Mar 2022 17:13), Max: 98.5 (10 Mar 2022 21:39)  HR: 85 (11 Mar 2022 17:13) (66 - 96)  BP: 100/63 (11 Mar 2022 17:13) (100/63 - 134/72)  BP(mean): 84 (10 Mar 2022 20:56) (82 - 95)  RR: 18 (11 Mar 2022 17:13) (15 - 25)  SpO2: 95% (11 Mar 2022 17:13) (95% - 100%)    Physical Exam:  General: NAD, resting comfortably in bed  Pulmonary: Nonlabored breathing, no respiratory distress  Breast: Dressing over right breast stained w/ blood. Dressing removed. Breasts looking asymmetric, right breast ecchymosis around vertical incision w/ underlying hematoma and oozing from the incision. Point tenderness around right JAKE entry site. JPs (x2) with sanguinous output (Rt: ~300cc; Lt: 60cc since AM). No fulness or edema in axillary area.   Cardiovascular: NSR  Abdominal: soft, nontender, nondistended  Extremities: WWP, normal strength, SCDs in place  Neuro: A/O x 3, CNs II-XII grossly intact, no focal deficits    I&O's Summary    10 Mar 2022 07:01  -  11 Mar 2022 07:00  --------------------------------------------------------  IN: 1500 mL / OUT: 1310 mL / NET: 190 mL    11 Mar 2022 07:01  -  11 Mar 2022 18:47  --------------------------------------------------------  IN: 1750 mL / OUT: 855 mL / NET: 895 mL        LABS:                        10.0   7.04  )-----------( 187      ( 11 Mar 2022 16:26 )             31.0     03-11    137  |  105  |  9   ----------------------------<  169<H>  3.8   |  22  |  0.43<L>    Ca    8.7      11 Mar 2022 05:54  Phos  4.3     03-11  Mg     1.7     03-11      PT/INR - ( 11 Mar 2022 16:26 )   PT: 13.0 sec;   INR: 1.09          PTT - ( 11 Mar 2022 16:26 )  PTT:30.4 sec    CAPILLARY BLOOD GLUCOSE            RADIOLOGY & ADDITIONAL STUDIES:

## 2022-03-11 NOTE — BRIEF OPERATIVE NOTE - OPERATION/FINDINGS
Pt placed in supine position. Prepped and draped in sterile fashion. Vertical incision from inferior areola to right breast base and 4 cm transverse incision at breast base. Subcutaneous breast tissue dissected with electrocautery to the chest wall posteriorly, sternum medially, clavicle superiorly, and lat dorsi laterally. Right breast implant and capsule spared. Specimen removed. Anza node identified with navigator probe and taken with electrocautery. Hemostasis achieved. Plastics to follow for bilateral tissue expander implants.
Bilateral tissue expanders placement s/p mastectomy JAKE X 2
Surgical field prepped and draped in standard fashion. Previous surgical incision opened. Tissue expander mobilized and removed. Large hematoma estimated at ~600cc was noted posterior to the TE and was removed. No pulsatile bleeding noted upon visualization of all four quadrants of breast cavity. Strict hemostasis was achieved with electrocautery. JAKE drain was placed. Wound was closed primarily. Xeroform and telfa for dressing.

## 2022-03-11 NOTE — BRIEF OPERATIVE NOTE - NSICDXBRIEFPOSTOP_GEN_ALL_CORE_FT
POST-OP DIAGNOSIS:  Breast cancer 10-Mar-2022 17:41:19  Tuan Jameson  
POST-OP DIAGNOSIS:  Breast cancer 10-Mar-2022 17:41:19  Tuan Jameson  
POST-OP DIAGNOSIS:  Breast hematoma 11-Mar-2022 20:45:51  Ian Ma

## 2022-03-11 NOTE — BRIEF OPERATIVE NOTE - NSICDXBRIEFPROCEDURE_GEN_ALL_CORE_FT
PROCEDURES:  Washout, wound, chest 11-Mar-2022 20:44:23  Ian Ma  Removal of breast tissue expander 11-Mar-2022 20:45:09  Ian Ma  
PROCEDURES:  Nipple sparing mastectomy of right breast 10-Mar-2022 17:40:59  Tuan Jameson  
PROCEDURES:  Mastectomy with insertion of tissue expander 10-Mar-2022 19:06:56  Romelia Thompson

## 2022-03-11 NOTE — BRIEF OPERATIVE NOTE - NSICDXBRIEFPREOP_GEN_ALL_CORE_FT
PRE-OP DIAGNOSIS:  Breast cancer 10-Mar-2022 17:41:14  Tuan Jameson  
PRE-OP DIAGNOSIS:  Hematoma of right breast 11-Mar-2022 20:45:39  Ian Ma  
PRE-OP DIAGNOSIS:  Breast cancer 10-Mar-2022 17:41:14  Tuan Jameson

## 2022-03-12 DIAGNOSIS — Z98.890 OTHER SPECIFIED POSTPROCEDURAL STATES: ICD-10-CM

## 2022-03-12 LAB
ANION GAP SERPL CALC-SCNC: 9 MMOL/L — SIGNIFICANT CHANGE UP (ref 5–17)
BUN SERPL-MCNC: 10 MG/DL — SIGNIFICANT CHANGE UP (ref 7–23)
CALCIUM SERPL-MCNC: 8.6 MG/DL — SIGNIFICANT CHANGE UP (ref 8.4–10.5)
CHLORIDE SERPL-SCNC: 104 MMOL/L — SIGNIFICANT CHANGE UP (ref 96–108)
CO2 SERPL-SCNC: 24 MMOL/L — SIGNIFICANT CHANGE UP (ref 22–31)
CREAT SERPL-MCNC: 0.55 MG/DL — SIGNIFICANT CHANGE UP (ref 0.5–1.3)
EGFR: 110 ML/MIN/1.73M2 — SIGNIFICANT CHANGE UP
GLUCOSE SERPL-MCNC: 159 MG/DL — HIGH (ref 70–99)
HCT VFR BLD CALC: 26.2 % — LOW (ref 34.5–45)
HCT VFR BLD CALC: 26.7 % — LOW (ref 34.5–45)
HGB BLD-MCNC: 8.2 G/DL — LOW (ref 11.5–15.5)
HGB BLD-MCNC: 8.3 G/DL — LOW (ref 11.5–15.5)
MAGNESIUM SERPL-MCNC: 1.7 MG/DL — SIGNIFICANT CHANGE UP (ref 1.6–2.6)
MCHC RBC-ENTMCNC: 30.4 PG — SIGNIFICANT CHANGE UP (ref 27–34)
MCHC RBC-ENTMCNC: 30.7 PG — SIGNIFICANT CHANGE UP (ref 27–34)
MCHC RBC-ENTMCNC: 31.1 GM/DL — LOW (ref 32–36)
MCHC RBC-ENTMCNC: 31.3 GM/DL — LOW (ref 32–36)
MCV RBC AUTO: 97.8 FL — SIGNIFICANT CHANGE UP (ref 80–100)
MCV RBC AUTO: 98.1 FL — SIGNIFICANT CHANGE UP (ref 80–100)
NRBC # BLD: 0 /100 WBCS — SIGNIFICANT CHANGE UP (ref 0–0)
NRBC # BLD: 0 /100 WBCS — SIGNIFICANT CHANGE UP (ref 0–0)
PHOSPHATE SERPL-MCNC: 4.2 MG/DL — SIGNIFICANT CHANGE UP (ref 2.5–4.5)
PLATELET # BLD AUTO: 191 K/UL — SIGNIFICANT CHANGE UP (ref 150–400)
PLATELET # BLD AUTO: 199 K/UL — SIGNIFICANT CHANGE UP (ref 150–400)
POTASSIUM SERPL-MCNC: 4.1 MMOL/L — SIGNIFICANT CHANGE UP (ref 3.5–5.3)
POTASSIUM SERPL-SCNC: 4.1 MMOL/L — SIGNIFICANT CHANGE UP (ref 3.5–5.3)
RBC # BLD: 2.67 M/UL — LOW (ref 3.8–5.2)
RBC # BLD: 2.73 M/UL — LOW (ref 3.8–5.2)
RBC # FLD: 13 % — SIGNIFICANT CHANGE UP (ref 10.3–14.5)
RBC # FLD: 13.2 % — SIGNIFICANT CHANGE UP (ref 10.3–14.5)
SODIUM SERPL-SCNC: 137 MMOL/L — SIGNIFICANT CHANGE UP (ref 135–145)
WBC # BLD: 5.79 K/UL — SIGNIFICANT CHANGE UP (ref 3.8–10.5)
WBC # BLD: 7.39 K/UL — SIGNIFICANT CHANGE UP (ref 3.8–10.5)
WBC # FLD AUTO: 5.79 K/UL — SIGNIFICANT CHANGE UP (ref 3.8–10.5)
WBC # FLD AUTO: 7.39 K/UL — SIGNIFICANT CHANGE UP (ref 3.8–10.5)

## 2022-03-12 RX ORDER — MAGNESIUM SULFATE 500 MG/ML
1 VIAL (ML) INJECTION ONCE
Refills: 0 | Status: COMPLETED | OUTPATIENT
Start: 2022-03-12 | End: 2022-03-12

## 2022-03-12 RX ORDER — CEFAZOLIN SODIUM 1 G
2000 VIAL (EA) INJECTION EVERY 8 HOURS
Refills: 0 | Status: DISCONTINUED | OUTPATIENT
Start: 2022-03-12 | End: 2022-03-13

## 2022-03-12 RX ADMIN — Medication 650 MILLIGRAM(S): at 17:08

## 2022-03-12 RX ADMIN — Medication 5 MILLIGRAM(S): at 13:07

## 2022-03-12 RX ADMIN — Medication 100 MILLIGRAM(S): at 13:11

## 2022-03-12 RX ADMIN — OXYCODONE HYDROCHLORIDE 5 MILLIGRAM(S): 5 TABLET ORAL at 00:21

## 2022-03-12 RX ADMIN — Medication 5 MILLIGRAM(S): at 21:53

## 2022-03-12 RX ADMIN — Medication 5 MILLIGRAM(S): at 05:26

## 2022-03-12 RX ADMIN — OXYCODONE HYDROCHLORIDE 5 MILLIGRAM(S): 5 TABLET ORAL at 01:04

## 2022-03-12 RX ADMIN — Medication 650 MILLIGRAM(S): at 12:00

## 2022-03-12 RX ADMIN — Medication 650 MILLIGRAM(S): at 17:48

## 2022-03-12 RX ADMIN — Medication 100 GRAM(S): at 13:49

## 2022-03-12 RX ADMIN — Medication 100 MILLIGRAM(S): at 21:54

## 2022-03-12 RX ADMIN — Medication 650 MILLIGRAM(S): at 05:26

## 2022-03-12 RX ADMIN — Medication 650 MILLIGRAM(S): at 23:43

## 2022-03-12 RX ADMIN — Medication 650 MILLIGRAM(S): at 23:09

## 2022-03-12 RX ADMIN — Medication 650 MILLIGRAM(S): at 06:02

## 2022-03-12 RX ADMIN — Medication 650 MILLIGRAM(S): at 11:05

## 2022-03-12 RX ADMIN — Medication 100 MILLIGRAM(S): at 05:26

## 2022-03-12 NOTE — PROGRESS NOTE ADULT - SUBJECTIVE AND OBJECTIVE BOX
SUBJECTIVE:  No overnight events, no complaints    OBJECTIVE:     ** VITAL SIGNS / I&O's **    Vital Signs Last 24 Hrs  T(C): 36.8 (09 Feb 2022 04:48), Max: 37.1 (09 Feb 2022 00:29)  T(F): 98.2 (09 Feb 2022 04:48), Max: 98.7 (09 Feb 2022 00:29)  HR: 66 (09 Feb 2022 04:48) (66 - 89)  BP: 110/70 (09 Feb 2022 04:48) (86/54 - 118/78)  BP(mean): 83 (09 Feb 2022 04:48) (64 - 83)  RR: 16 (09 Feb 2022 04:48) (13 - 45)  SpO2: 98% (09 Feb 2022 04:48) (97% - 100%)      08 Feb 2022 07:01  -  09 Feb 2022 07:00  --------------------------------------------------------  IN:    Lactated Ringers: 880 mL  Total IN: 880 mL    OUT:    Bulb (mL): 35 mL    Voided (mL): 950 mL  Total OUT: 985 mL    Total NET: -105 mL          ** PHYSICAL EXAM **    -- CONSTITUTIONAL: Alert, Awake. NAD.   -- RESPIRATORY: unlabored breathing, no respiratory distress  -- CHEST: b/l chest dressings c/d/i, no swelling, drains ss      ** LABS **                          11.0   11.16 )-----------( 246      ( 09 Feb 2022 07:14 )             33.9     09 Feb 2022 07:14    137    |  104    |  9      ----------------------------<  95     4.0     |  22     |  x        Ca    8.7        09 Feb 2022 07:14  Phos  3.5       09 Feb 2022 07:14  Mg     1.7       09 Feb 2022 07:14        CAPILLARY BLOOD GLUCOSE

## 2022-03-12 NOTE — DISCHARGE NOTE PROVIDER - NSDCFUSCHEDAPPT_GEN_ALL_CORE_FT
[facility] ; 03/17/2022 ; NPP Breast [address]  [facility] ; 03/21/2022 ; NPP PlastSurg [address] [facility] ; 03/23/2022 ; NPP HemOnc  [address] [facility] ; 03/23/2022 ; [facility]

## 2022-03-12 NOTE — DISCHARGE NOTE PROVIDER - CARE PROVIDER_API CALL
Juli Elise (DO)  Surgery  [address], [address] [place] [phone]  Phone: [phone]  Fax: [phone]  Follow Up Time: 1 week    Jose R Barry)  Plastic Surgery  [address] [address]  Phone: [phone]  Fax: [phone]  Follow Up Time: 1 week   Juli Elise (DO)  Surgery  [address], [address] [place] [id number]  Phone: [phone]  Fax: [phone]  Follow Up Time: 1 week    Jose R Barry)  Plastic Surgery  [address] [address]  Phone: [phone]  Fax: [phone]  Scheduled Appointment: 03/17/2022

## 2022-03-12 NOTE — DISCHARGE NOTE PROVIDER - NSDCFUADDINST_GEN_ALL_CORE_FT
You may shower; soap and water over incision sites. Do not scrub. Pat dry when done. No tub bathing or swimming until cleared. Keep incision sites out of the sun as scars will darken. Ambulate as tolerated, but no heavy lifting (>10lbs) or strenuous exercise. You may resume regular diet. You should be urinating at least 3-4x per day. Call the office if you experience increasing abdominal pain, nausea, vomiting, or temperature >101 F.  Warning Signs:  Please call your doctor or nurse practitioner if you experience the following:  *You experience new chest pain, pressure, squeezing or tightness.  *New or worsening cough, shortness of breath, or wheeze.  *If you are vomiting and cannot keep down fluids or your medications.  *You are getting dehydrated due to continued vomiting, diarrhea, or other reasons. Signs of dehydration include dry mouth, rapid heartbeat, or feeling dizzy or faint when standing.  *You see blood or dark/black material when you vomit or have a bowel movement.  *You experience burning when you urinate, have blood in your urine, or experience a discharge.  *Your pain is not improving within 8-12 hours or is not gone within 24 hours. Call or return immediately if your pain is getting worse, changes location, or moves to your chest or back.  *You have shaking chills, or fever greater than 101.5 degrees Fahrenheit or 38 degrees Celsius.  *Any change in your symptoms, or any new symptoms that concern you. Please follow up with Lisbeth Elise (Breast Surgery) and Dec (Plastic Surgery) in one week; you may call their offices at the numbers provided to make appointments at your earliest convenience.  Please take 2 tablets of extra-strength Tylenol (500mg) every 6-8 hours for pain control. Do NOT exceed 4,000mg of Tylenol per 24 hours.    General Discharge Instructions:  Please resume all regular home medications unless specifically advised not to take a particular medication. Also, please take any new medications as prescribed.  Please get plenty of rest, continue to ambulate several times per day, and drink adequate amounts of fluids. Avoid lifting weights greater than 5-10 lbs until you follow-up with your surgeon, who will instruct you further regarding activity restrictions.  Avoid driving or operating heavy machinery while taking pain medications.  Please follow-up with your surgeon and Primary Care Provider (PCP) as advised.    Incision Care:  *Please call your doctor or nurse practitioner if you have increased pain, swelling, redness, or drainage from the incision site.  *Avoid swimming and baths until your follow-up appointment.    Warning Signs:  Please call your doctor or nurse practitioner if you experience the following:  *You experience new chest pain, pressure, squeezing or tightness.  *New or worsening cough, shortness of breath, or wheeze.  *If you are vomiting and cannot keep down fluids or your medications.  *You are getting dehydrated due to continued vomiting, diarrhea, or other reasons. Signs of dehydration include dry mouth, rapid heartbeat, or feeling dizzy or faint when standing.  *You see blood or dark/black material when you vomit or have a bowel movement.  *You experience burning when you urinate, have blood in your urine, or experience a discharge.  *Your pain is not improving within 8-12 hours or is not gone within 24 hours. Call or return immediately if your pain is getting worse, changes location, or moves to your chest or back.  *You have shaking chills, or fever greater than 101.5 degrees Fahrenheit or 38 degrees Celsius.  *Any change in your symptoms, or any new symptoms that concern you.    You are being discharged with 2 JAKE drains which will be re-examined at time of your office follow up visit. Please follow below instructions for drain care:  *Please look at the site every day for signs of infection (increased redness or pain, swelling, odor, yellow or bloody discharge, warm to touch, fever).  *Maintain suction of the bulb at all times.  *Note color, consistency, and amount of fluid in the drain. Call the doctor, nurse practitioner, or VNA nurse if the amount increases significantly or changes in character.  *Be sure to empty the drain frequently. Record the daily output in milliliters (cc), as instructed.  *Keep the insertion site clean and dry otherwise.  *Avoid swimming, baths, hot tubs; do not submerge yourself in water.  *Make sure to keep the drain attached securely to your body to prevent pulling or dislocation.   Please follow up with Lisbeth Elise (Breast Surgery) and Dec (Plastic Surgery) in one week; you may call their offices at the numbers provided to make appointments at your earliest convenience.  Please take one tablet of Percocet every 4-6 hours, if needed for pain control. Each tablet contains 325mg of acetaminophene. Do NOT exceed 4,000mg of acetaminophene (12 tablets) per 24 hours.    You are being discharged with 2 JAKE drains. These will be re-examined and may be removed during your follow up office visit by Dr. Barry. Please follow below instructions for drain care:  *Please look at the site every day for signs of infection (increased redness or pain, swelling, odor, yellow or bloody discharge, warm to touch, fever).  *Maintain suction of the bulb.  *Note color, consistency, and amount of fluid in the drain. Call the doctor, nurse practitioner, or VNA nurse if the amount increases significantly or changes in character.  *Be sure to empty the drain frequently. Record the daily output, as instructed.  *Keep the insertion site clean and dry otherwise.  *Avoid swimming, baths, hot tubs; do not submerge yourself in water.  *Make sure to keep the drain attached securely to your body to prevent pulling or dislocation.    Please do NOT remove the dressing and keep it dry until follow up visit by Dr. Barry (Plastic Surgery).    General Discharge Instructions:  Please resume all regular home medications unless specifically advised not to take a particular medication. Also, please take any new medications as prescribed.  Please get plenty of rest, continue to ambulate several times per day, and drink adequate amounts of fluids. Avoid lifting weights greater than 5-10 lbs until you follow-up with your surgeon, who will instruct you further regarding activity restrictions.  Avoid driving or operating heavy machinery while taking pain medications.  Please follow-up with your surgeon and Primary Care Provider (PCP) as advised.    Incision Care:  *Please call your doctor or nurse practitioner if you have increased pain, swelling, redness, or drainage from the incision site.  *Avoid swimming and baths until your follow-up appointment.    Warning Signs:  Please call your doctor or nurse practitioner if you experience the following:  *You experience new chest pain, pressure, squeezing or tightness.  *New or worsening cough, shortness of breath, or wheeze.  *If you are vomiting and cannot keep down fluids or your medications.  *You are getting dehydrated due to continued vomiting, diarrhea, or other reasons. Signs of dehydration include dry mouth, rapid heartbeat, or feeling dizzy or faint when standing.  *You see blood or dark/black material when you vomit or have a bowel movement.  *You experience burning when you urinate, have blood in your urine, or experience a discharge.  *Your pain is not improving within 8-12 hours or is not gone within 24 hours. Call or return immediately if your pain is getting worse, changes location, or moves to your chest or back.  *You have shaking chills, or fever greater than 101.5 degrees Fahrenheit or 38 degrees Celsius.  *Any change in your symptoms, or any new symptoms that concern you.

## 2022-03-12 NOTE — DISCHARGE NOTE PROVIDER - NSDCCPTREATMENT_GEN_ALL_CORE_FT
PRINCIPAL PROCEDURE  Procedure: Nipple sparing mastectomy of right breast  Findings and Treatment:       SECONDARY PROCEDURE  Procedure: Mastectomy with insertion of tissue expander  Findings and Treatment:     Procedure: Evacuation, hematoma, breast  Findings and Treatment:     Procedure: Removal of breast tissue expander  Findings and Treatment:

## 2022-03-12 NOTE — DISCHARGE NOTE PROVIDER - NSDCMRMEDTOKEN_GEN_ALL_CORE_FT
ALPRAZolam 0.5 mg oral tablet: 1 tab(s) orally once a day, As Needed  ALPRAZolam 0.5 mg oral tablet, extended release: 1 tab(s) orally once a day (in the morning)  Ambien 10 mg oral tablet: 1 tab(s) orally once a day (at bedtime)   Ambien 10 mg oral tablet: 1 tab(s) orally once a day (at bedtime)  diazePAM 5 mg oral tablet: 1 tab(s) orally every 8 hours  Percocet 5/325 oral tablet: 1 tab(s) orally every 6 hours, if needed for pain

## 2022-03-12 NOTE — DISCHARGE NOTE PROVIDER - PROVIDER TOKENS
PROVIDER:[TOKEN:[[id number]],FOLLOWUP:[1 week]],PROVIDER:[TOKEN:[[id number]],FOLLOWUP:[1 week]] PROVIDER:[TOKEN:[[id number]],FOLLOWUP:[1 week]],PROVIDER:[TOKEN:[[id number]],SCHEDULEDAPPT:[03/17/2022]]

## 2022-03-12 NOTE — DISCHARGE NOTE PROVIDER - NSDCCPCAREPLAN_GEN_ALL_CORE_FT
PRINCIPAL DISCHARGE DIAGNOSIS  Diagnosis: S/P right mastectomy  Assessment and Plan of Treatment:        PRINCIPAL DISCHARGE DIAGNOSIS  Diagnosis: Breast cancer  Assessment and Plan of Treatment:

## 2022-03-12 NOTE — DISCHARGE NOTE PROVIDER - CARE PROVIDERS DIRECT ADDRESSES
[email or web address]
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
## 2022-03-12 NOTE — DISCHARGE NOTE PROVIDER - NSDCACTIVITY_GEN_ALL_CORE
Showering allowed/No heavy lifting/straining Showering allowed/Walking - Indoors allowed/No heavy lifting/straining/Walking - Outdoors allowed/Follow Instructions Provided by your Surgical Team

## 2022-03-12 NOTE — PROGRESS NOTE ADULT - SUBJECTIVE AND OBJECTIVE BOX
SUBJECTIVE: Patient seen and examined bedside.    ceFAZolin   IVPB 2000 milliGRAM(s) IV Intermittent every 8 hours      Vital Signs Last 24 Hrs  T(C): 36.9 (12 Mar 2022 08:50), Max: 37.1 (11 Mar 2022 20:17)  T(F): 98.4 (12 Mar 2022 08:50), Max: 98.7 (11 Mar 2022 20:17)  HR: 66 (12 Mar 2022 08:50) (65 - 96)  BP: 109/68 (12 Mar 2022 08:50) (92/53 - 124/59)  BP(mean): 79 (12 Mar 2022 05:38) (65 - 85)  RR: 15 (12 Mar 2022 08:50) (15 - 18)  SpO2: 99% (12 Mar 2022 08:50) (95% - 100%)  I&O's Detail    11 Mar 2022 07:01  -  12 Mar 2022 07:00  --------------------------------------------------------  IN:    IV PiggyBack: 50 mL    IV PiggyBack: 100 mL    Lactated Ringers: 700 mL    Oral Fluid: 950 mL  Total IN: 1800 mL    OUT:    Bulb (mL): 412.5 mL    Bulb (mL): 120 mL    Voided (mL): 1300 mL  Total OUT: 1832.5 mL    Total NET: -32.5 mL      12 Mar 2022 06:01  -  12 Mar 2022 12:28  --------------------------------------------------------  IN:    Oral Fluid: 350 mL  Total IN: 350 mL    OUT:    Bulb (mL): 40 mL    Bulb (mL): 30 mL    Voided (mL): 300 mL  Total OUT: 370 mL    Total NET: -20 mL          General: NAD, resting comfortably in bed  C/V: NSR  Pulm: Nonlabored breathing, no respiratory distress  Abd: soft, NT/ND.  Extrem: WWP, no edema, SCDs in place        LABS:                        8.3    5.79  )-----------( 199      ( 12 Mar 2022 07:31 )             26.7     03-12    137  |  104  |  10  ----------------------------<  159<H>  4.1   |  24  |  0.55    Ca    8.6      12 Mar 2022 07:31  Phos  4.2     03-12  Mg     1.7     03-12      PT/INR - ( 11 Mar 2022 16:26 )   PT: 13.0 sec;   INR: 1.09          PTT - ( 11 Mar 2022 16:26 )  PTT:30.4 sec      RADIOLOGY & ADDITIONAL STUDIES:   SUBJECTIVE: Patient seen and examined bedside. Pt reports feeling well this morning. Denies pain, nausea or vomiting. Tolerating diet. Passing flatus and having BMs.    ceFAZolin   IVPB 2000 milliGRAM(s) IV Intermittent every 8 hours      Vital Signs Last 24 Hrs  T(C): 36.9 (12 Mar 2022 08:50), Max: 37.1 (11 Mar 2022 20:17)  T(F): 98.4 (12 Mar 2022 08:50), Max: 98.7 (11 Mar 2022 20:17)  HR: 66 (12 Mar 2022 08:50) (65 - 96)  BP: 109/68 (12 Mar 2022 08:50) (92/53 - 124/59)  BP(mean): 79 (12 Mar 2022 05:38) (65 - 85)  RR: 15 (12 Mar 2022 08:50) (15 - 18)  SpO2: 99% (12 Mar 2022 08:50) (95% - 100%)  I&O's Detail    11 Mar 2022 07:01  -  12 Mar 2022 07:00  --------------------------------------------------------  IN:    IV PiggyBack: 50 mL    IV PiggyBack: 100 mL    Lactated Ringers: 700 mL    Oral Fluid: 950 mL  Total IN: 1800 mL    OUT:    Bulb (mL): 412.5 mL    Bulb (mL): 120 mL    Voided (mL): 1300 mL  Total OUT: 1832.5 mL    Total NET: -32.5 mL      12 Mar 2022 06:01  -  12 Mar 2022 12:28  --------------------------------------------------------  IN:    Oral Fluid: 350 mL  Total IN: 350 mL    OUT:    Bulb (mL): 40 mL    Bulb (mL): 30 mL    Voided (mL): 300 mL  Total OUT: 370 mL    Total NET: -20 mL          General: NAD, resting comfortably in bed  C/V: NSR  Pulm: Nonlabored breathing, no respiratory distress  Breast: S/p b/l nipple sparing mastectomy with tissue expanders, R tissue expander removed in OR. JAKE drains with SS output. No evidence of hematoma. Incisions CDI.   Abd: soft, NT/ND.  Extrem: WWP, no edema, SCDs in place        LABS:                        8.3    5.79  )-----------( 199      ( 12 Mar 2022 07:31 )             26.7     03-12    137  |  104  |  10  ----------------------------<  159<H>  4.1   |  24  |  0.55    Ca    8.6      12 Mar 2022 07:31  Phos  4.2     03-12  Mg     1.7     03-12      PT/INR - ( 11 Mar 2022 16:26 )   PT: 13.0 sec;   INR: 1.09          PTT - ( 11 Mar 2022 16:26 )  PTT:30.4 sec      RADIOLOGY & ADDITIONAL STUDIES:

## 2022-03-13 ENCOUNTER — TRANSCRIPTION ENCOUNTER (OUTPATIENT)
Age: 52
End: 2022-03-13

## 2022-03-13 VITALS
OXYGEN SATURATION: 97 % | HEART RATE: 81 BPM | SYSTOLIC BLOOD PRESSURE: 109 MMHG | DIASTOLIC BLOOD PRESSURE: 68 MMHG | TEMPERATURE: 98 F | RESPIRATION RATE: 17 BRPM

## 2022-03-13 LAB
ANION GAP SERPL CALC-SCNC: 9 MMOL/L — SIGNIFICANT CHANGE UP (ref 5–17)
BUN SERPL-MCNC: 8 MG/DL — SIGNIFICANT CHANGE UP (ref 7–23)
CALCIUM SERPL-MCNC: 8.7 MG/DL — SIGNIFICANT CHANGE UP (ref 8.4–10.5)
CHLORIDE SERPL-SCNC: 106 MMOL/L — SIGNIFICANT CHANGE UP (ref 96–108)
CO2 SERPL-SCNC: 28 MMOL/L — SIGNIFICANT CHANGE UP (ref 22–31)
CREAT SERPL-MCNC: 0.57 MG/DL — SIGNIFICANT CHANGE UP (ref 0.5–1.3)
EGFR: 109 ML/MIN/1.73M2 — SIGNIFICANT CHANGE UP
GLUCOSE SERPL-MCNC: 104 MG/DL — HIGH (ref 70–99)
HCT VFR BLD CALC: 23.9 % — LOW (ref 34.5–45)
HCT VFR BLD CALC: 25.5 % — LOW (ref 34.5–45)
HGB BLD-MCNC: 7.7 G/DL — LOW (ref 11.5–15.5)
HGB BLD-MCNC: 8.3 G/DL — LOW (ref 11.5–15.5)
MAGNESIUM SERPL-MCNC: 1.8 MG/DL — SIGNIFICANT CHANGE UP (ref 1.6–2.6)
MCHC RBC-ENTMCNC: 32.1 PG — SIGNIFICANT CHANGE UP (ref 27–34)
MCHC RBC-ENTMCNC: 32.2 GM/DL — SIGNIFICANT CHANGE UP (ref 32–36)
MCHC RBC-ENTMCNC: 32.3 PG — SIGNIFICANT CHANGE UP (ref 27–34)
MCHC RBC-ENTMCNC: 32.5 GM/DL — SIGNIFICANT CHANGE UP (ref 32–36)
MCV RBC AUTO: 99.2 FL — SIGNIFICANT CHANGE UP (ref 80–100)
MCV RBC AUTO: 99.6 FL — SIGNIFICANT CHANGE UP (ref 80–100)
NRBC # BLD: 0 /100 WBCS — SIGNIFICANT CHANGE UP (ref 0–0)
NRBC # BLD: 0 /100 WBCS — SIGNIFICANT CHANGE UP (ref 0–0)
PHOSPHATE SERPL-MCNC: 4.1 MG/DL — SIGNIFICANT CHANGE UP (ref 2.5–4.5)
PLATELET # BLD AUTO: 151 K/UL — SIGNIFICANT CHANGE UP (ref 150–400)
PLATELET # BLD AUTO: 159 K/UL — SIGNIFICANT CHANGE UP (ref 150–400)
POTASSIUM SERPL-MCNC: 4.3 MMOL/L — SIGNIFICANT CHANGE UP (ref 3.5–5.3)
POTASSIUM SERPL-SCNC: 4.3 MMOL/L — SIGNIFICANT CHANGE UP (ref 3.5–5.3)
RBC # BLD: 2.4 M/UL — LOW (ref 3.8–5.2)
RBC # BLD: 2.57 M/UL — LOW (ref 3.8–5.2)
RBC # FLD: 13.1 % — SIGNIFICANT CHANGE UP (ref 10.3–14.5)
RBC # FLD: 13.2 % — SIGNIFICANT CHANGE UP (ref 10.3–14.5)
SODIUM SERPL-SCNC: 143 MMOL/L — SIGNIFICANT CHANGE UP (ref 135–145)
WBC # BLD: 3.61 K/UL — LOW (ref 3.8–10.5)
WBC # BLD: 3.62 K/UL — LOW (ref 3.8–10.5)
WBC # FLD AUTO: 3.61 K/UL — LOW (ref 3.8–10.5)
WBC # FLD AUTO: 3.62 K/UL — LOW (ref 3.8–10.5)

## 2022-03-13 PROCEDURE — 84100 ASSAY OF PHOSPHORUS: CPT

## 2022-03-13 PROCEDURE — 88307 TISSUE EXAM BY PATHOLOGIST: CPT

## 2022-03-13 PROCEDURE — C1889: CPT

## 2022-03-13 PROCEDURE — 81025 URINE PREGNANCY TEST: CPT

## 2022-03-13 PROCEDURE — 83735 ASSAY OF MAGNESIUM: CPT

## 2022-03-13 PROCEDURE — 36415 COLL VENOUS BLD VENIPUNCTURE: CPT

## 2022-03-13 PROCEDURE — 86850 RBC ANTIBODY SCREEN: CPT

## 2022-03-13 PROCEDURE — 86901 BLOOD TYPING SEROLOGIC RH(D): CPT

## 2022-03-13 PROCEDURE — 88305 TISSUE EXAM BY PATHOLOGIST: CPT

## 2022-03-13 PROCEDURE — C1789: CPT

## 2022-03-13 PROCEDURE — 85610 PROTHROMBIN TIME: CPT

## 2022-03-13 PROCEDURE — 85027 COMPLETE CBC AUTOMATED: CPT

## 2022-03-13 PROCEDURE — 85730 THROMBOPLASTIN TIME PARTIAL: CPT

## 2022-03-13 PROCEDURE — 80048 BASIC METABOLIC PNL TOTAL CA: CPT

## 2022-03-13 PROCEDURE — 86900 BLOOD TYPING SEROLOGIC ABO: CPT

## 2022-03-13 RX ORDER — ALPRAZOLAM 0.25 MG
1 TABLET ORAL
Qty: 0 | Refills: 0 | DISCHARGE

## 2022-03-13 RX ORDER — DIAZEPAM 5 MG
1 TABLET ORAL
Qty: 0 | Refills: 0 | DISCHARGE
Start: 2022-03-13

## 2022-03-13 RX ORDER — MESALAMINE 400 MG
4800 TABLET, DELAYED RELEASE (ENTERIC COATED) ORAL DAILY
Refills: 0 | Status: DISCONTINUED | OUTPATIENT
Start: 2022-03-13 | End: 2022-03-13

## 2022-03-13 RX ORDER — MAGNESIUM SULFATE 500 MG/ML
1 VIAL (ML) INJECTION ONCE
Refills: 0 | Status: COMPLETED | OUTPATIENT
Start: 2022-03-13 | End: 2022-03-13

## 2022-03-13 RX ADMIN — Medication 650 MILLIGRAM(S): at 11:45

## 2022-03-13 RX ADMIN — Medication 100 GRAM(S): at 08:25

## 2022-03-13 RX ADMIN — Medication 650 MILLIGRAM(S): at 11:08

## 2022-03-13 RX ADMIN — Medication 5 MILLIGRAM(S): at 05:48

## 2022-03-13 RX ADMIN — Medication 100 MILLIGRAM(S): at 13:33

## 2022-03-13 RX ADMIN — Medication 100 MILLIGRAM(S): at 05:48

## 2022-03-13 RX ADMIN — Medication 650 MILLIGRAM(S): at 05:48

## 2022-03-13 RX ADMIN — Medication 5 MILLIGRAM(S): at 13:12

## 2022-03-13 RX ADMIN — Medication 650 MILLIGRAM(S): at 06:02
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
## 2022-03-13 NOTE — PROGRESS NOTE ADULT - REASON FOR ADMISSION
Breast cancer
yes

## 2022-03-13 NOTE — DISCHARGE NOTE NURSING/CASE MANAGEMENT/SOCIAL WORK - PATIENT PORTAL LINK FT
You can access the FollowMyHealth Patient Portal offered by [facility] by registering at the following website: [email or web address]. By joining [facility]’s FollowMyHealth portal, you will also be able to view your health information using other applications (apps) compatible with our system.

## 2022-03-13 NOTE — DISCHARGE NOTE NURSING/CASE MANAGEMENT/SOCIAL WORK - NSDCPEFALRISK_GEN_ALL_CORE
For information on Fall & Injury Prevention, visit: [email or web address] OR  [email or web address] OR  https://www.cdc.gov/steadi/patient.html

## 2022-03-13 NOTE — PROGRESS NOTE ADULT - NUTRITIONAL ASSESSMENT
This patient has been assessed with a concern for Malnutrition and has been determined to have a diagnosis/diagnoses of Underweight (BMI < 19).    This patient is being managed with:   Diet Regular-  Supplement Feeding Modality:  Oral  Ensure Enlive Cans or Servings Per Day:  1       Frequency:  Three Times a day  Entered: Mar 13 2022  9:47AM    
This patient has been assessed with a concern for Malnutrition and has been determined to have a diagnosis/diagnoses of Underweight (BMI < 19).    This patient is being managed with:   Diet NPO-  Entered: Mar 11 2022  5:12PM    Diet NPO-  NPO for Procedure/Test     NPO Start Date: 11-Mar-2022   NPO Start Time: 15:30  Entered: Mar 11 2022  3:32PM    
This patient has been assessed with a concern for Malnutrition and has been determined to have a diagnosis/diagnoses of Underweight (BMI < 19).    This patient is being managed with:   Diet Regular-  Supplement Feeding Modality:  Oral  Ensure Enlive Cans or Servings Per Day:  1       Frequency:  Three Times a day  Entered: Mar 11 2022  8:30PM    

## 2022-03-13 NOTE — PROGRESS NOTE ADULT - SUBJECTIVE AND OBJECTIVE BOX
SUBJECTIVE: Patient visited bedside this morning. Lying comfortably in bed and has no complaints. States has been a little sleepy since last evening. Pain is well controlled. Tolerating regular diet well w/o nausea or vomiting. Denies fever, chills, dizziness, light-headedness, palpitation, coughs, shortness of breath, chest pain, abdominal pain, or pain in extremities.      MEDICATIONS  (STANDING):  acetaminophen     Tablet .. 650 milliGRAM(s) Oral every 6 hours  ceFAZolin   IVPB 2000 milliGRAM(s) IV Intermittent every 8 hours  diazepam    Tablet 5 milliGRAM(s) Oral every 8 hours  lactated ringers. 1000 milliLiter(s) (100 mL/Hr) IV Continuous <Continuous>    MEDICATIONS  (PRN):  oxyCODONE    IR 5 milliGRAM(s) Oral every 6 hours PRN Severe Pain (7 - 10)      Vital Signs Last 24 Hrs  T(C): 36.8 (13 Mar 2022 13:00), Max: 36.8 (12 Mar 2022 17:03)  T(F): 98.3 (13 Mar 2022 13:00), Max: 98.3 (13 Mar 2022 05:31)  HR: 81 (13 Mar 2022 13:00) (65 - 81)  BP: 109/68 (13 Mar 2022 13:00) (103/57 - 113/72)  BP(mean): --  RR: 17 (13 Mar 2022 13:00) (15 - 18)  SpO2: 97% (13 Mar 2022 13:00) (97% - 100%)    Physical Exam:  General: NAD, resting comfortably in bed  C/V: NSR  Pulm: Nonlabored breathing, no respiratory distress  Breast: S/p b/l nipple sparing mastectomy with tissue expanders, R tissue expander removed in OR 3/11. JAKE drains with SS output. No evidence of hematoma. Incisions CDI.   Abd: soft, NT/ND.  Extrem: WWP, no edema, SCDs in place      I&O's Summary    12 Mar 2022 06:01  -  13 Mar 2022 07:00  --------------------------------------------------------  IN: 840 mL / OUT: 2032.5 mL / NET: -1192.5 mL    13 Mar 2022 07:01  -  13 Mar 2022 14:14  --------------------------------------------------------  IN: 950 mL / OUT: 310 mL / NET: 640 mL        LABS:                        8.3    3.61  )-----------( 159      ( 13 Mar 2022 08:41 )             25.5     03-13    143  |  106  |  8   ----------------------------<  104<H>  4.3   |  28  |  0.57    Ca    8.7      13 Mar 2022 06:37  Phos  4.1     03-13  Mg     1.8     03-13      PT/INR - ( 11 Mar 2022 16:26 )   PT: 13.0 sec;   INR: 1.09          PTT - ( 11 Mar 2022 16:26 )  PTT:30.4 sec    CAPILLARY BLOOD GLUCOSE            RADIOLOGY & ADDITIONAL STUDIES:

## 2022-03-14 ENCOUNTER — NON-APPOINTMENT (OUTPATIENT)
Age: 52
End: 2022-03-14
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
## 2022-03-15 ENCOUNTER — NON-APPOINTMENT (OUTPATIENT)
Age: 52
End: 2022-03-15

## 2022-03-17 ENCOUNTER — APPOINTMENT (OUTPATIENT)
Dept: BREAST CENTER | Facility: CLINIC | Age: 52
End: 2022-03-17
Payer: MEDICAID

## 2022-03-17 VITALS
DIASTOLIC BLOOD PRESSURE: 81 MMHG | SYSTOLIC BLOOD PRESSURE: 135 MMHG | BODY MASS INDEX: 18.06 KG/M2 | HEART RATE: 83 BPM | HEIGHT: 71 IN | OXYGEN SATURATION: 100 % | WEIGHT: 129 LBS

## 2022-03-17 LAB — SURGICAL PATHOLOGY STUDY: SIGNIFICANT CHANGE UP

## 2022-03-17 PROCEDURE — 99024 POSTOP FOLLOW-UP VISIT: CPT

## 2022-03-21 ENCOUNTER — APPOINTMENT (OUTPATIENT)
Dept: PLASTIC SURGERY | Facility: CLINIC | Age: 52
End: 2022-03-21
Payer: MEDICAID

## 2022-03-21 VITALS — BODY MASS INDEX: 18.06 KG/M2 | WEIGHT: 129 LBS | HEIGHT: 71 IN

## 2022-03-21 PROCEDURE — 99024 POSTOP FOLLOW-UP VISIT: CPT

## 2022-03-21 RX ORDER — DIAZEPAM 5 MG/1
5 TABLET ORAL
Qty: 10 | Refills: 0 | Status: DISCONTINUED | COMMUNITY
Start: 2022-03-03 | End: 2022-03-21

## 2022-03-21 RX ORDER — DIAZEPAM 5 MG/1
5 TABLET ORAL
Qty: 15 | Refills: 0 | Status: DISCONTINUED | COMMUNITY
Start: 2022-01-06 | End: 2022-03-21

## 2022-03-21 NOTE — HISTORY OF PRESENT ILLNESS
[FreeTextEntry1] : 50 yo female presents for post-operative evaluation, most recently s/p right mastectomy, right SLNB and bilateral TE placement (by Dr. Barry) on 3/10/22; immediate post-op course was complicated by right breast hematoma s/p evacuation and washout and removal of TE. Final surgical pathology of right breast showed IDP and LCIS and 0/2+ SLNs. Left JAKE drain with 60 ml output today; Right JAKE drain with 30 ml. \par \par Patient s/p left breast mastectomy, left SLNB on 2/8/22 without reconstruction, for left breast invasive mammary carcinoma s/ focal mammary carcinoma in situ (ER+ RI+ HER2 negative). The patient was planned for b/l mastectomy w/ TE recon on 2/1/22, with anaphylactic reaction. Final surgical path from 2/8/22 revealed IDC with 1/2 LN positive. Oncoptye is 13.\par \par The patient met with allergist and it was determined that isosulfan blue was the cause of her anaphylactic reaction.

## 2022-03-21 NOTE — PHYSICAL EXAM
[Normocephalic] : normocephalic [Atraumatic] : atraumatic [Supple] : supple [No Supraclavicular Adenopathy] : no supraclavicular adenopathy [Examined in the supine and seated position] : examined in the supine and seated position [No dominant masses] : no dominant masses in right breast  [No Nipple Retraction] : no left nipple retraction [No Nipple Discharge] : no left nipple discharge [No Axillary Lymphadenopathy] : no left axillary lymphadenopathy [No Edema] : no edema [No Rashes] : no rashes [No Ulceration] : no ulceration [de-identified] : bilateral mastectomy flaps healing well, b/l JAKE drains in place with SS drainage

## 2022-03-21 NOTE — DATA REVIEWED
[FreeTextEntry1] : 11/5/21 ([facility]) b/l dx mmg and US: heterogeneously dense. Corresponding to the patient's palpable finding is a 3.9 cm mass in the left breast 1-2 o'clock which is suspicious for malignancy. This would correspond to the area of architectural distortion seen on mammography. Recommend ultrasound-guided core biopsy and clip placement. BI-RADS 4.\par \par 11/9/21 ([facility] path) US bx:1. Left breast 1:00-2:00 5cm fn, core biopsy:  Invasive moderately differentiated mammary carcinoma with associated calcifications - Focal mammary carcinoma in situ. IHC: ESTROGEN RECEPTOR: POSITIVE 45% NUCLEAR STAINING WITH WEAK/MODERATE 1+/2+ INTENSITY PROGESTERONE RECEPTOR: POSITIVE 30% NUCLEAR STAINING WITH MODERATE 2+ INTENSITY HER-2: NEGATIVE (0 MEMBRANOUS STAINING) \par \par 11/23/21 ([facility]) MRI: 1. MR guided core biopsy or clip placement for the right 10:00 axis mass enhancement. 2. Appropriate action for the large left breast cancer.  3. Targeted left axillary ultrasound and possible ultrasound core biopsy based on a palpable abnormality in the left axilla by the referring clinician, which is likely not included on this study. BI-RADS 4. \par  \par 12/9/21 ([facility]) Left axilla US:1. No adenopathy. 2. Palpable finding in the left axillary tail is the posterior extent of the biopsy proven malignancy. BI-RADS 6.\par \par 12/9/21 ([facility]) MRI biopsy not performed: Multiple attempts were made to target the 0.6 cm focus of nonmass enhancement in the right breast. Unfortunately, significant patient motion within the grid, during imaging including between sequences, limited ability to place tissue marker clip, or perform biopsy.Given canceled biopsy recommend targeted right breast ultrasound. If no sonographic correlate is present, then short interval follow-up, 4 months, would be recommended. \par \par 2/1/22 ([facility]) Surgical path: 1. Breast, left, retroareolar tissue; excision: - Benign breast tissue. 2. Breast, left, new anterior margin; excision: - Microinvasive carcinoma (1.0 mm) involving inked margin, in a background of atypical lobular hyperplasia (ALH), aprocrine metaplasia and fibrocystic changes - See Note.\par \par 2/8/22 ([facility]) Surgical path: 1. Breast and capsule, left; mastectomy- Invasive ductal carcinoma, measuring 4.0 cm grossly, see note and synoptic report - Ductal carcinoma in situ (DCIS), cribriform type with intermediate nuclear grade\par - Margins of invasive carcinoma: Anterior at lower outer quadrant: less than 1 mm Posterior: less than 1 mm - Margins of DCIS: Posterior: 2 mm Anterior: 5 mm - Lobular carcinoma in situ (LCIS) - Positive for lymphovascular invasion - Biopsy site changes - Benign skeletal muscle and capsule - Calcifications associated with invasive carcinoma, in situ carcinoma and benign epithelium\par 2. Posterior margin, left breast, excision: - Benign fibroadipose tissue\par 3. Lymph node, left axilla, sentinel, excision: - One out of two lymph nodes positive for metastatic carcinoma (1/2) - Metastatic focus measures 4 mm - Negative for extranodal extension \par 4. Breast, left, new anterior margin, 2:00 1 cm FN; excision: - Benign breast tissue \par 5. Breast, left, new anterior margin, 5:30 6 cm; excision: - Benign breast tissue\par  6. Breast, left, mastectomy skin; excision: - Benign skin with changes of prior procedure

## 2022-03-22 NOTE — SURGICAL HISTORY
[de-identified] : 01/13/22; bilateral tissue expander breast reconstruction [de-identified] : 02/08/22; Mastectomy [de-identified] : 03/10/22; Bilateral T/E Placement [de-identified] : 03/11/22; Right T/E Removal

## 2022-03-22 NOTE — HISTORY OF PRESENT ILLNESS
[FreeTextEntry1] : 50 y/o female presents 11 days s/p bilateral T/E placement and 10 days s/p right T/E removal. Patient had left mastectomy done with Dr. Elise on the 2/8/22. Denies any f/c/n/v. She is taking percocet for the pain. She has bilateral JAKE drains in place to suction --> serosang fluid, not ready for removal. She will need chemo and radiation on the left side. \par \par PE: Left TE in place, incisions c/d/i, no collections or s/sx of infection, JAKE drains in place \par \par RTC in 1 week for JAKE drain removal and TE fill\par \par

## 2022-03-23 ENCOUNTER — APPOINTMENT (OUTPATIENT)
Age: 52
End: 2022-03-23

## 2022-03-23 ENCOUNTER — APPOINTMENT (OUTPATIENT)
Dept: RADIATION ONCOLOGY | Facility: CLINIC | Age: 52
End: 2022-03-23
Payer: MEDICAID

## 2022-03-23 ENCOUNTER — OUTPATIENT (OUTPATIENT)
Dept: OUTPATIENT SERVICES | Facility: HOSPITAL | Age: 52
LOS: 1 days | End: 2022-03-23
Payer: COMMERCIAL

## 2022-03-23 ENCOUNTER — NON-APPOINTMENT (OUTPATIENT)
Age: 52
End: 2022-03-23

## 2022-03-23 ENCOUNTER — APPOINTMENT (OUTPATIENT)
Dept: HEMATOLOGY ONCOLOGY | Facility: CLINIC | Age: 52
End: 2022-03-23
Payer: MEDICAID

## 2022-03-23 VITALS
TEMPERATURE: 98 F | OXYGEN SATURATION: 98 % | RESPIRATION RATE: 18 BRPM | HEART RATE: 87 BPM | DIASTOLIC BLOOD PRESSURE: 77 MMHG | WEIGHT: 130.07 LBS | HEIGHT: 71 IN | SYSTOLIC BLOOD PRESSURE: 126 MMHG

## 2022-03-23 VITALS
BODY MASS INDEX: 18.34 KG/M2 | TEMPERATURE: 98.1 F | SYSTOLIC BLOOD PRESSURE: 133 MMHG | WEIGHT: 131 LBS | OXYGEN SATURATION: 99 % | HEART RATE: 106 BPM | RESPIRATION RATE: 18 BRPM | DIASTOLIC BLOOD PRESSURE: 88 MMHG | HEIGHT: 71 IN

## 2022-03-23 VITALS
WEIGHT: 131 LBS | BODY MASS INDEX: 18.34 KG/M2 | HEART RATE: 83 BPM | OXYGEN SATURATION: 99 % | RESPIRATION RATE: 16 BRPM | TEMPERATURE: 98.5 F | SYSTOLIC BLOOD PRESSURE: 122 MMHG | DIASTOLIC BLOOD PRESSURE: 91 MMHG | HEIGHT: 71 IN

## 2022-03-23 DIAGNOSIS — C50.919 MALIGNANT NEOPLASM OF UNSPECIFIED SITE OF UNSPECIFIED FEMALE BREAST: ICD-10-CM

## 2022-03-23 DIAGNOSIS — Z86.018 PERSONAL HISTORY OF OTHER BENIGN NEOPLASM: Chronic | ICD-10-CM

## 2022-03-23 DIAGNOSIS — N88.8 OTHER SPECIFIED NONINFLAMMATORY DISORDERS OF CERVIX UTERI: Chronic | ICD-10-CM

## 2022-03-23 DIAGNOSIS — R79.89 OTHER SPECIFIED ABNORMAL FINDINGS OF BLOOD CHEMISTRY: ICD-10-CM

## 2022-03-23 DIAGNOSIS — Z98.82 BREAST IMPLANT STATUS: Chronic | ICD-10-CM

## 2022-03-23 LAB
ALBUMIN SERPL ELPH-MCNC: 4.6 G/DL
ALP BLD-CCNC: 59 U/L
ALT SERPL-CCNC: 15 U/L
ANION GAP SERPL CALC-SCNC: 15 MMOL/L
AST SERPL-CCNC: 23 U/L
BASOPHILS # BLD AUTO: 0.03 K/UL
BASOPHILS NFR BLD AUTO: 0.5 %
BILIRUB SERPL-MCNC: 0.2 MG/DL
BUN SERPL-MCNC: 10 MG/DL
CALCIUM SERPL-MCNC: 9.4 MG/DL
CHLORIDE SERPL-SCNC: 103 MMOL/L
CO2 SERPL-SCNC: 23 MMOL/L
CREAT SERPL-MCNC: 0.51 MG/DL
CRP SERPL-MCNC: <3 MG/L
EGFR: 112 ML/MIN/1.73M2
EOSINOPHIL # BLD AUTO: 0.05 K/UL
EOSINOPHIL NFR BLD AUTO: 0.9 %
ERYTHROCYTE [SEDIMENTATION RATE] IN BLOOD BY WESTERGREN METHOD: 40 MM/HR
GLUCOSE SERPL-MCNC: 118 MG/DL
HCT VFR BLD CALC: 32.6 %
HGB BLD-MCNC: 10.5 G/DL
IMM GRANULOCYTES NFR BLD AUTO: 0.5 %
LDH SERPL-CCNC: 143 U/L
LYMPHOCYTES # BLD AUTO: 0.63 K/UL
LYMPHOCYTES NFR BLD AUTO: 10.8 %
MAGNESIUM SERPL-MCNC: 1.9 MG/DL
MAN DIFF?: NORMAL
MCHC RBC-ENTMCNC: 30.4 PG
MCHC RBC-ENTMCNC: 32.2 GM/DL
MCV RBC AUTO: 94.5 FL
MONOCYTES # BLD AUTO: 0.39 K/UL
MONOCYTES NFR BLD AUTO: 6.7 %
NEUTROPHILS # BLD AUTO: 4.7 K/UL
NEUTROPHILS NFR BLD AUTO: 80.6 %
PHOSPHATE SERPL-MCNC: 4.4 MG/DL
PLATELET # BLD AUTO: 401 K/UL
POTASSIUM SERPL-SCNC: 4 MMOL/L
PROT SERPL-MCNC: 7.4 G/DL
RBC # BLD: 3.45 M/UL
RBC # FLD: 12.6 %
SODIUM SERPL-SCNC: 141 MMOL/L
URATE SERPL-MCNC: 4.8 MG/DL
WBC # FLD AUTO: 5.83 K/UL

## 2022-03-23 PROCEDURE — 96372 THER/PROPH/DIAG INJ SC/IM: CPT

## 2022-03-23 PROCEDURE — 99203 OFFICE O/P NEW LOW 30 MIN: CPT | Mod: 25

## 2022-03-23 PROCEDURE — 99214 OFFICE O/P EST MOD 30 MIN: CPT | Mod: 25

## 2022-03-23 RX ORDER — GOSERELIN ACETATE 10.8 MG/1
3.6 IMPLANT SUBCUTANEOUS ONCE
Refills: 0 | Status: COMPLETED | OUTPATIENT
Start: 2022-03-23 | End: 2022-03-23

## 2022-03-23 RX ADMIN — GOSERELIN ACETATE 3.6 MILLIGRAM(S): 10.8 IMPLANT SUBCUTANEOUS at 15:26

## 2022-03-23 NOTE — PHYSICAL EXAM
[Fully active, able to carry on all pre-disease performance without restriction] : Status 0 - Fully active, able to carry on all pre-disease performance without restriction [Normal] : affect appropriate [de-identified] : b/l mastectomy - b/l drains with expanders
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
## 2022-03-23 NOTE — ASSESSMENT
[FreeTextEntry1] : 50 yo premenopausal Polish woman ( fluent in English and Polish) referred by Dr. Elise for evaluation of left breast cancer.   Biopsy revealed IDC ESTROGEN RECEPTOR: POSITIVE 45% NUCLEAR STAINING WITH WEAK/MODERATE 1+/2+ INTENSITY PROGESTERONE RECEPTOR: POSITIVE 30% NUCLEAR STAINING WITH MODERATE 2+ INTENSITY HER-2: NEGATIVE (0 MEMBRANOUS STAINING) KI 67 35 %.\par \par Imaging studies with left breast mass in US 3.9 x 1.1 x 1.2 cm, MRI max dimension 5.3 cm.\par Right breast lesion - biopsy unsuccessful.\par \par Patient decided on bilateral mastectomy.  She understand indication for systemic chemotherapy based on lymph node status and molecular testing. 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 Reviewed protocols of chemotherapy including ACdd> Tweekly, TC and CMF with the last one with lowest incidence of hair loss.\par Reviewed options for scalp cooling to decrease hair loss. \par Reviewed with patient indication for radiation therapy if tumor over 5 cm or LN positive.\par Plan for endocrine treatment with or without CDK 4/6 based on the final pathology.\par \par Mammaprint results c/w low recurrence score, reviewed with patient if LN positive given the fact that she is premenopausal she will have indication for adjuvant chemotherapy. If LN negative plan for AI. \par Zoladex 3.6 mg today, reviewed side effects. \par \par Patient underwent left sided mastectomy - pathology report reviewed, IDC 4 cm, T2, N1, with LVI.\par Reviewed with patient high risk features and benefit from chemotherapy based of RxPonder data.  \par Reviewed with patient options for chemotherapy with Acdd> Tw.  Side effects and schema as well as need for Mediport placement and ECHO prior to Adriamycin reviewed with patient.  \par She is scheduled for right sided mastectomy- will follow pathology from right breast. \par AC dd q 2 weeks\par Adriamycin 60 mg /m2 x 1.75 = 105 mg over 30 min\par Cytoxan 600 mg /m2  x 1.75= 1050  mg over 30 min\par Neulasta onpro 6 mg SQ x 4 q 2 weeks\par Emend 150 mg IVSS\par Aloxi 0.25 IVP\par Dexamethasone 12 mg IVSS\par \par followed by \par \par Taxol 80 mg/m2 =140 mg over 60 min\par Benadryl 50 mg IV\par Famotidine 20mg  IV\par Dexamethasone 12 mg IVSS\par Plan for mastectomy followed by chemotherapy reviewed with Dr. Monteiro.\par Labs ordered and reviewed.\par \par Patient underwent right sided mastectomy and b/l placement of the expanders, her postop course was complicated by bleeding from right side.\par \par She feels depressed and anxious - had 4 surgeries in the last 6 weeks, 1st one with anaphylactic reaction to MB, left breast mastectomy, followed by right breast mastectomy, complicated by bleeding.  She c/o chest wall pain and takes Percocet 3 x per day.\par Feels tired and was unable to sleep at night well.\par \par Continue Zoladex 3/23/2022\par Right breast mastectomy - pathology reviewed 3/23/2022- papilloma and ADH\par \par Anemia - blood loss-, start PO iron , ferrous gluconate PO daily in am. Hg 10.5. \par \par RX lexapro- start with 5 mg, will increase to 10 mg if tolerates ok. \par She has limited social support, lives by herself, father and mother ( dementia)  in [place],  referred to , would benefit from psychosocial support.  \par \par Will present patient in multidisciplinary TB \par Blood drawn in office. Ordered and reviewed.\par \par \par

## 2022-03-28 ENCOUNTER — APPOINTMENT (OUTPATIENT)
Dept: PLASTIC SURGERY | Facility: CLINIC | Age: 52
End: 2022-03-28
Payer: MEDICAID

## 2022-03-28 VITALS — HEIGHT: 71 IN | BODY MASS INDEX: 18.34 KG/M2 | WEIGHT: 131 LBS

## 2022-03-28 PROCEDURE — 99024 POSTOP FOLLOW-UP VISIT: CPT

## 2022-03-28 RX ORDER — OXYCODONE AND ACETAMINOPHEN 5; 325 MG/1; MG/1
5-325 TABLET ORAL
Qty: 20 | Refills: 0 | Status: DISCONTINUED | COMMUNITY
Start: 2022-01-06 | End: 2022-03-28

## 2022-03-28 RX ORDER — OXYCODONE AND ACETAMINOPHEN 5; 325 MG/1; MG/1
5-325 TABLET ORAL
Qty: 12 | Refills: 0 | Status: DISCONTINUED | COMMUNITY
Start: 2022-03-18 | End: 2022-03-28

## 2022-03-28 RX ORDER — OXYCODONE AND ACETAMINOPHEN 5; 325 MG/1; MG/1
5-325 TABLET ORAL
Qty: 12 | Refills: 0 | Status: DISCONTINUED | COMMUNITY
Start: 2022-03-21 | End: 2022-03-28

## 2022-03-28 RX ORDER — OXYCODONE AND ACETAMINOPHEN 5; 325 MG/1; MG/1
5-325 TABLET ORAL
Qty: 20 | Refills: 0 | Status: DISCONTINUED | COMMUNITY
Start: 2022-03-03 | End: 2022-03-28

## 2022-03-30 ENCOUNTER — TRANSCRIPTION ENCOUNTER (OUTPATIENT)
Age: 52
End: 2022-03-30

## 2022-03-31 ENCOUNTER — APPOINTMENT (OUTPATIENT)
Dept: RADIATION ONCOLOGY | Facility: CLINIC | Age: 52
End: 2022-03-31

## 2022-03-31 ENCOUNTER — NON-APPOINTMENT (OUTPATIENT)
Age: 52
End: 2022-03-31

## 2022-03-31 NOTE — SURGICAL HISTORY
[de-identified] : 01/13/22; bilateral tissue expander breast reconstruction [de-identified] : 02/08/22; Mastectomy [de-identified] : 03/10/22; Bilateral T/E Placement [de-identified] : 03/11/22; Right T/E Removal

## 2022-03-31 NOTE — HISTORY OF PRESENT ILLNESS
[FreeTextEntry1] : 50 y/o female presents 17 days s/p bilateral T/E placement and 10 days s/p right T/E removal. Patient had left mastectomy done with Dr. Elise on the 2/8/22. Denies any f/c/n/v. She taking ibuprofen 600mg for pain. She has bilateral JAKE drains in place to suction --> serosang fluid, ready for removal. She will need chemo and radiation on the left side. \par \par PE: Left TE in place, incisions healing well, no collections or s/sx of infection, JAKE drains removed\par After localizing the port of the tissue expander with the magnet, I prepped the area with chloroprep and then using a 21 gauge butterfly needle, I percutaneously accessed the port of the expander, air removed and injected 120 cc of injectable saline into the expander. \par Totals:\par Left: 120 cc\par These are Ref# [id number] expanders with 500 cc capacity.\par RTC in 1 week \par \par

## 2022-04-04 ENCOUNTER — APPOINTMENT (OUTPATIENT)
Dept: PLASTIC SURGERY | Facility: CLINIC | Age: 52
End: 2022-04-04
Payer: MEDICAID

## 2022-04-04 VITALS — WEIGHT: 131 LBS | BODY MASS INDEX: 18.34 KG/M2 | OXYGEN SATURATION: 99 % | HEIGHT: 71 IN | HEART RATE: 74 BPM

## 2022-04-04 PROCEDURE — 99024 POSTOP FOLLOW-UP VISIT: CPT

## 2022-04-05 NOTE — SURGICAL HISTORY
[de-identified] : 01/13/22; bilateral tissue expander breast reconstruction [de-identified] : 02/08/22; Mastectomy [de-identified] : 03/10/22; Bilateral T/E Placement [de-identified] : 03/11/22; Right T/E Removal

## 2022-04-05 NOTE — HISTORY OF PRESENT ILLNESS
[FreeTextEntry1] : 52 y/o female s/p bilateral T/E placement on 03/10/22 and s/p right T/E removal on 03/11/22. She had left mastectomy with Dr. Elise on the 2/8/22. Denies any f/c/n/v. She taking ibuprofen 600mg for pain prn. She will need chemo and radiation on the left side. \par \par PE: Left TE in place, incisions healing well, no collections or s/sx of infection\par After localizing the port of the tissue expander with the magnet, I prepped the area with chloroprep and then using a 21 gauge butterfly needle, I percutaneously accessed the port of the expander, air removed and injected 120 cc of injectable saline into the expander. \par Totals:\par Left: 240 cc\par These are Ref# [id number] expanders with 500 cc capacity.\par RTC in 1 week for last TE fill  \par \par

## 2022-04-11 ENCOUNTER — APPOINTMENT (OUTPATIENT)
Dept: PLASTIC SURGERY | Facility: CLINIC | Age: 52
End: 2022-04-11
Payer: MEDICAID

## 2022-04-11 PROCEDURE — 99024 POSTOP FOLLOW-UP VISIT: CPT

## 2022-04-12 NOTE — SURGICAL HISTORY
[de-identified] : 01/13/22; bilateral tissue expander breast reconstruction [de-identified] : 02/08/22; Mastectomy [de-identified] : 03/10/22; Bilateral T/E Placement [de-identified] : 03/11/22; Right T/E Removal

## 2022-04-12 NOTE — HISTORY OF PRESENT ILLNESS
[FreeTextEntry1] : 52 y/o female s/p bilateral T/E placement on 03/10/22 and s/p right T/E removal on 03/11/22. She had left mastectomy with Dr. Elise on the 2/8/22. Denies any f/c/n/v. She taking ibuprofen 600 mg for pain prn. She will need chemo and radiation on the left side. \par \par PE: Left TE in place, incisions healing well, no collections or s/sx of infection\par After localizing the port of the tissue expander with the magnet, I prepped the area with chloroprep and then using a 21 gauge butterfly needle, I percutaneously accessed the port of the expander, air removed and injected 180 cc of injectable saline into the expander. \par Totals:\par Left: 420 cc\par These are Ref# [id number] expanders with 500 cc capacity.\par This was the last expansion.\par Ok to start XRT.\par RTC 2 months after radiation is completed\par \par

## 2022-04-20 ENCOUNTER — APPOINTMENT (OUTPATIENT)
Dept: HEMATOLOGY ONCOLOGY | Facility: CLINIC | Age: 52
End: 2022-04-20
Payer: MEDICAID

## 2022-04-20 ENCOUNTER — OUTPATIENT (OUTPATIENT)
Dept: OUTPATIENT SERVICES | Facility: HOSPITAL | Age: 52
LOS: 1 days | End: 2022-04-20
Payer: COMMERCIAL

## 2022-04-20 ENCOUNTER — LABORATORY RESULT (OUTPATIENT)
Age: 52
End: 2022-04-20

## 2022-04-20 ENCOUNTER — APPOINTMENT (OUTPATIENT)
Age: 52
End: 2022-04-20

## 2022-04-20 ENCOUNTER — NON-APPOINTMENT (OUTPATIENT)
Age: 52
End: 2022-04-20

## 2022-04-20 VITALS
TEMPERATURE: 98 F | HEART RATE: 75 BPM | OXYGEN SATURATION: 99 % | DIASTOLIC BLOOD PRESSURE: 82 MMHG | RESPIRATION RATE: 18 BRPM | SYSTOLIC BLOOD PRESSURE: 126 MMHG

## 2022-04-20 VITALS
TEMPERATURE: 98 F | HEIGHT: 71 IN | WEIGHT: 132 LBS | DIASTOLIC BLOOD PRESSURE: 82 MMHG | BODY MASS INDEX: 18.48 KG/M2 | OXYGEN SATURATION: 99 % | RESPIRATION RATE: 18 BRPM | HEART RATE: 88 BPM | SYSTOLIC BLOOD PRESSURE: 119 MMHG

## 2022-04-20 DIAGNOSIS — N88.8 OTHER SPECIFIED NONINFLAMMATORY DISORDERS OF CERVIX UTERI: Chronic | ICD-10-CM

## 2022-04-20 DIAGNOSIS — C50.919 MALIGNANT NEOPLASM OF UNSPECIFIED SITE OF UNSPECIFIED FEMALE BREAST: ICD-10-CM

## 2022-04-20 DIAGNOSIS — Z86.018 PERSONAL HISTORY OF OTHER BENIGN NEOPLASM: Chronic | ICD-10-CM

## 2022-04-20 DIAGNOSIS — Z98.82 BREAST IMPLANT STATUS: Chronic | ICD-10-CM

## 2022-04-20 LAB
CRP SERPL-MCNC: <3 MG/L
ERYTHROCYTE [SEDIMENTATION RATE] IN BLOOD BY WESTERGREN METHOD: 8 MM/HR
LDH SERPL-CCNC: 128 U/L
MAGNESIUM SERPL-MCNC: 2 MG/DL
PHOSPHATE SERPL-MCNC: 4.8 MG/DL
URATE SERPL-MCNC: 5.2 MG/DL

## 2022-04-20 PROCEDURE — 96372 THER/PROPH/DIAG INJ SC/IM: CPT

## 2022-04-20 PROCEDURE — 99214 OFFICE O/P EST MOD 30 MIN: CPT | Mod: 25

## 2022-04-20 RX ORDER — GOSERELIN ACETATE 10.8 MG/1
3.6 IMPLANT SUBCUTANEOUS ONCE
Refills: 0 | Status: COMPLETED | OUTPATIENT
Start: 2022-04-20 | End: 2022-04-20

## 2022-04-20 RX ADMIN — GOSERELIN ACETATE 3.6 MILLIGRAM(S): 10.8 IMPLANT SUBCUTANEOUS at 15:10

## 2022-04-20 NOTE — ASSESSMENT
[FreeTextEntry1] : 52 yo premenopausal Polish woman ( fluent in English and Polish) referred by Dr. Elise for evaluation of left breast cancer.   Biopsy revealed IDC ESTROGEN RECEPTOR: POSITIVE 45% NUCLEAR STAINING WITH WEAK/MODERATE 1+/2+ INTENSITY PROGESTERONE RECEPTOR: POSITIVE 30% NUCLEAR STAINING WITH MODERATE 2+ INTENSITY HER-2: NEGATIVE (0 MEMBRANOUS STAINING) KI 67 35 %.\par \par Imaging studies with left breast mass in US 3.9 x 1.1 x 1.2 cm, MRI max dimension 5.3 cm.\par Right breast lesion - biopsy unsuccessful.\par \par Patient decided on bilateral mastectomy.  She understand indication for systemic chemotherapy based on lymph node status and molecular testing. 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 Reviewed protocols of chemotherapy including ACdd> Tweekly, TC and CMF with the last one with lowest incidence of hair loss.\par Mammaprint results c/w low recurrence score, reviewed with patient if LN positive given the fact that she is premenopausal she might have indication for adjuvant chemotherapy. If LN negative plan for AI. \par Zoladex 3.6 mg today, reviewed side effects. \par \par Patient underwent left sided mastectomy - pathology report reviewed, IDC 4 cm, T2, N1, with LVI.\par \par Patient underwent right sided mastectomy and b/l placement of the expanders, her postop course was complicated by bleeding from right side.\par \par She feels depressed and anxious - had 4 surgeries in the last 6 weeks, 1st one with anaphylactic reaction to MB, left breast mastectomy, followed by right breast mastectomy, complicated by bleeding.  S\par Feels tired and was unable to sleep at night well.\par \par Continue Zoladex since 3/23/2022\par Right breast mastectomy - pathology reviewed 3/23/2022- papilloma and ADH\par \par Anemia - blood loss-, start PO iron , ferrous gluconate PO daily in am. Hg 10.5. \par \par RX lexapro- start with 5 mg, will increase to 10 mg.\par She has limited social support, lives by herself, father and mother ( dementia)  in [place],  referred to , would benefit from psychosocial support.  She is very hesitant about chemotherapy since loosing her her will affect her livelihood. \par Oncotype Dx 13, distant risk of recurrence 13%, with no benefit from chemotherapy. \par \par Patient presented in multidisciplinary TB, offered OS to continue with anastrozole.  RX send today. Patient to follow up with rad onc to start RT.  She was educated to hold anastrozole during RT.\par Blood drawn in office. Ordered and reviewed.\par \par Continue ovarian suppression Zoladex, > change to Lupron 3.75 mg q 28 days as per insurance\par Start Anastrozole 1 mg  PO daily- hold for radiation\par \par OVT 4 weeks \par Labs next visit - CBC, CMP, ferritin \par \par \par
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
## 2022-04-20 NOTE — PHYSICAL EXAM
[Fully active, able to carry on all pre-disease performance without restriction] : Status 0 - Fully active, able to carry on all pre-disease performance without restriction [Normal] : affect appropriate [de-identified] : b/l mastectomy - b/l drains with expanders

## 2022-04-21 LAB
CANCER AG27-29 SERPL-ACNC: 19.6 U/ML
CEA SERPL-MCNC: 1 NG/ML

## 2022-05-13 ENCOUNTER — NON-APPOINTMENT (OUTPATIENT)
Age: 52
End: 2022-05-13

## 2022-05-16 ENCOUNTER — NON-APPOINTMENT (OUTPATIENT)
Age: 52
End: 2022-05-16

## 2022-05-18 ENCOUNTER — OUTPATIENT (OUTPATIENT)
Dept: OUTPATIENT SERVICES | Facility: HOSPITAL | Age: 52
LOS: 1 days | End: 2022-05-18
Payer: COMMERCIAL

## 2022-05-18 ENCOUNTER — APPOINTMENT (OUTPATIENT)
Dept: HEMATOLOGY ONCOLOGY | Facility: CLINIC | Age: 52
End: 2022-05-18
Payer: MEDICAID

## 2022-05-18 ENCOUNTER — APPOINTMENT (OUTPATIENT)
Dept: INFUSION THERAPY | Facility: CLINIC | Age: 52
End: 2022-05-18

## 2022-05-18 VITALS
DIASTOLIC BLOOD PRESSURE: 86 MMHG | WEIGHT: 134 LBS | SYSTOLIC BLOOD PRESSURE: 127 MMHG | RESPIRATION RATE: 18 BRPM | OXYGEN SATURATION: 98 % | HEART RATE: 73 BPM | TEMPERATURE: 96.3 F | BODY MASS INDEX: 18.76 KG/M2 | HEIGHT: 71 IN

## 2022-05-18 VITALS
DIASTOLIC BLOOD PRESSURE: 86 MMHG | WEIGHT: 134.04 LBS | TEMPERATURE: 96 F | RESPIRATION RATE: 18 BRPM | SYSTOLIC BLOOD PRESSURE: 127 MMHG | HEART RATE: 73 BPM | HEIGHT: 71 IN | OXYGEN SATURATION: 98 %

## 2022-05-18 DIAGNOSIS — Z86.018 PERSONAL HISTORY OF OTHER BENIGN NEOPLASM: Chronic | ICD-10-CM

## 2022-05-18 DIAGNOSIS — Z98.82 BREAST IMPLANT STATUS: Chronic | ICD-10-CM

## 2022-05-18 DIAGNOSIS — C50.919 MALIGNANT NEOPLASM OF UNSPECIFIED SITE OF UNSPECIFIED FEMALE BREAST: ICD-10-CM

## 2022-05-18 DIAGNOSIS — N88.8 OTHER SPECIFIED NONINFLAMMATORY DISORDERS OF CERVIX UTERI: Chronic | ICD-10-CM

## 2022-05-18 LAB
ALBUMIN SERPL ELPH-MCNC: 4.7 G/DL
ALP BLD-CCNC: 76 U/L
ALT SERPL-CCNC: 17 U/L
ANION GAP SERPL CALC-SCNC: 10 MMOL/L
AST SERPL-CCNC: 18 U/L
BASOPHILS # BLD AUTO: 0.03 K/UL
BASOPHILS NFR BLD AUTO: 0.7 %
BILIRUB SERPL-MCNC: 0.3 MG/DL
BUN SERPL-MCNC: 11 MG/DL
CALCIUM SERPL-MCNC: 9.7 MG/DL
CHLORIDE SERPL-SCNC: 103 MMOL/L
CO2 SERPL-SCNC: 26 MMOL/L
CREAT SERPL-MCNC: 0.49 MG/DL
CRP SERPL-MCNC: <3 MG/L
EGFR: 113 ML/MIN/1.73M2
EOSINOPHIL # BLD AUTO: 0.14 K/UL
EOSINOPHIL NFR BLD AUTO: 3.2 %
ERYTHROCYTE [SEDIMENTATION RATE] IN BLOOD BY WESTERGREN METHOD: 10 MM/HR
GLUCOSE SERPL-MCNC: 91 MG/DL
HCT VFR BLD CALC: 44.5 %
HGB BLD-MCNC: 14.4 G/DL
IMM GRANULOCYTES NFR BLD AUTO: 0.2 %
LDH SERPL-CCNC: 150 U/L
LYMPHOCYTES # BLD AUTO: 0.79 K/UL
LYMPHOCYTES NFR BLD AUTO: 18.2 %
MAGNESIUM SERPL-MCNC: 1.9 MG/DL
MAN DIFF?: NORMAL
MCHC RBC-ENTMCNC: 29.3 PG
MCHC RBC-ENTMCNC: 32.4 GM/DL
MCV RBC AUTO: 90.4 FL
MONOCYTES # BLD AUTO: 0.44 K/UL
MONOCYTES NFR BLD AUTO: 10.1 %
NEUTROPHILS # BLD AUTO: 2.93 K/UL
NEUTROPHILS NFR BLD AUTO: 67.6 %
PHOSPHATE SERPL-MCNC: 4.6 MG/DL
PLATELET # BLD AUTO: 231 K/UL
POTASSIUM SERPL-SCNC: 3.9 MMOL/L
PROT SERPL-MCNC: 7.7 G/DL
RBC # BLD: 4.92 M/UL
RBC # FLD: 14.2 %
SODIUM SERPL-SCNC: 139 MMOL/L
URATE SERPL-MCNC: 5.1 MG/DL
WBC # FLD AUTO: 4.34 K/UL

## 2022-05-18 PROCEDURE — 96402 CHEMO HORMON ANTINEOPL SQ/IM: CPT

## 2022-05-18 PROCEDURE — 99214 OFFICE O/P EST MOD 30 MIN: CPT | Mod: 25

## 2022-05-18 RX ADMIN — Medication 7.5 MILLIGRAM(S): at 15:50

## 2022-05-19 LAB
CANCER AG27-29 SERPL-ACNC: 19.2 U/ML
CEA SERPL-MCNC: 1.6 NG/ML

## 2022-05-19 RX ORDER — ZOLPIDEM TARTRATE 10 MG/1
10 TABLET ORAL
Qty: 30 | Refills: 2 | Status: ACTIVE | COMMUNITY
Start: 2021-10-22 | End: 1900-01-01
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
## 2022-05-24 NOTE — PHYSICAL EXAM
[Fully active, able to carry on all pre-disease performance without restriction] : Status 0 - Fully active, able to carry on all pre-disease performance without restriction [Normal] : affect appropriate [de-identified] : b/l mastectomy - left breast expanded, right no expander

## 2022-05-24 NOTE — ASSESSMENT
[FreeTextEntry1] : 52 yo premenopausal Polish woman ( fluent in English and Polish) referred by Dr. Elise for evaluation of left breast cancer.   Biopsy revealed IDC ESTROGEN RECEPTOR: POSITIVE 45% NUCLEAR STAINING WITH WEAK/MODERATE 1+/2+ INTENSITY PROGESTERONE RECEPTOR: POSITIVE 30% NUCLEAR STAINING WITH MODERATE 2+ INTENSITY HER-2: NEGATIVE (0 MEMBRANOUS STAINING) KI 67 35 %.\par \par Imaging studies with left breast mass in US 3.9 x 1.1 x 1.2 cm, MRI max dimension 5.3 cm.\par Right breast lesion - biopsy unsuccessful.\par \par Patient decided on bilateral mastectomy.  She understand indication for systemic chemotherapy based on lymph node status and molecular testing. 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 Reviewed protocols of chemotherapy including ACdd> Tweekly, TC and CMF with the last one with lowest incidence of hair loss.\par Mammaprint results c/w low recurrence score, reviewed with patient if LN positive given the fact that she is premenopausal she might have indication for adjuvant chemotherapy. If LN negative plan for AI. \par Zoladex 3.6 mg today, reviewed side effects. \par \par Patient underwent left sided mastectomy - pathology report reviewed, IDC 4 cm, T2, N1, with LVI.\par \par Patient underwent right sided mastectomy and b/l placement of the expanders, her postop course was complicated by bleeding from right side.\par \par She feels depressed and anxious - had 4 surgeries in the last 6 weeks, 1st one with anaphylactic reaction to MB, left breast mastectomy, followed by right breast mastectomy, complicated by bleeding.  S\par Feels tired and was unable to sleep at night well.\par \par Continue Zoladex since 3/23/2022\par Right breast mastectomy - pathology reviewed 3/23/2022- papilloma and ADH\par \par Anemia - blood loss-, start PO iron , ferrous gluconate PO daily in am. Hg 10.5. \par \par RX lexapro- start with 5 mg, will increase to 10 mg.\par She has limited social support, lives by herself, father and mother ( dementia)  in [place],  referred to , would benefit from psychosocial support.  She is very hesitant about chemotherapy since loosing her her will affect her livelihood. \par Oncotype Dx 13, distant risk of recurrence 13%, with no benefit from chemotherapy. \par \par Patient presented in multidisciplinary TB, offered OS to continue with anastrozole.  RX send today. Patient to follow up with rad onc to start RT.  She was educated to hold anastrozole during RT.\par Blood drawn in office. Ordered and reviewed.\par \par Continue ovarian suppression Zoladex, > change to Lupron 3.75 mg q 28 days as per insurance\par Start Anastrozole 1 mg  PO daily- hold during radiation. \par Cleared to start radiation\par \par \par \par OVT 4 weeks \par Labs next visit - CBC, CMP, ferritin \par \par \par

## 2022-06-06 ENCOUNTER — APPOINTMENT (OUTPATIENT)
Dept: BREAST CENTER | Facility: CLINIC | Age: 52
End: 2022-06-06

## 2022-06-06 VITALS — HEIGHT: 71 IN | HEART RATE: 88 BPM | BODY MASS INDEX: 18.62 KG/M2 | OXYGEN SATURATION: 98 % | WEIGHT: 133 LBS

## 2022-06-06 PROCEDURE — 99213 OFFICE O/P EST LOW 20 MIN: CPT | Mod: 24

## 2022-06-06 PROCEDURE — 93702 BIS XTRACELL FLUID ANALYSIS: CPT | Mod: NC

## 2022-06-06 RX ORDER — ACETIC ACID 20 MG/ML
2 SOLUTION AURICULAR (OTIC)
Qty: 15 | Refills: 0 | Status: DISCONTINUED | COMMUNITY
Start: 2021-07-26 | End: 2022-06-06

## 2022-06-06 RX ORDER — NEOMYCIN SULFATE, POLYMYXIN B SULFATE, HYDROCORTISONE 3.5; 10000; 1 MG/ML; [USP'U]/ML; MG/ML
1 SOLUTION/ DROPS AURICULAR (OTIC)
Qty: 10 | Refills: 0 | Status: DISCONTINUED | COMMUNITY
Start: 2021-07-12 | End: 2022-06-06

## 2022-06-06 RX ORDER — IBUPROFEN 600 MG/1
600 TABLET, FILM COATED ORAL EVERY 6 HOURS
Qty: 24 | Refills: 0 | Status: DISCONTINUED | COMMUNITY
Start: 2022-03-24 | End: 2022-06-06

## 2022-06-08 ENCOUNTER — APPOINTMENT (OUTPATIENT)
Dept: PLASTIC SURGERY | Facility: CLINIC | Age: 52
End: 2022-06-08
Payer: MEDICAID

## 2022-06-08 PROCEDURE — 99024 POSTOP FOLLOW-UP VISIT: CPT

## 2022-06-08 NOTE — HISTORY OF PRESENT ILLNESS
[FreeTextEntry1] : 52 y/o female s/p bilateral mastectomy and T/E placement on 03/10/22 and s/p right T/E removal on 03/11/22.. Denies any f/c/n/v. She's starting radiation today on the left side x 5 weeks. \par \par PE: Left TE in place, incisions well healed, no collections or s/sx of infection\par Total:\par Left: 420 cc\par These are Ref# [id number] expanders with 500 cc capacity.\par RTC beginning of September to plan delayed right breast reconstruction.\par \par

## 2022-06-08 NOTE — SURGICAL HISTORY
[de-identified] : 01/13/22; bilateral tissue expander breast reconstruction [de-identified] : 02/08/22; Mastectomy [de-identified] : 03/10/22; Bilateral T/E Placement [de-identified] : 03/11/22; Right T/E Removal

## 2022-06-13 ENCOUNTER — NON-APPOINTMENT (OUTPATIENT)
Age: 52
End: 2022-06-13

## 2022-06-13 VITALS
SYSTOLIC BLOOD PRESSURE: 135 MMHG | HEIGHT: 71 IN | OXYGEN SATURATION: 100 % | DIASTOLIC BLOOD PRESSURE: 86 MMHG | WEIGHT: 135 LBS | TEMPERATURE: 98 F | BODY MASS INDEX: 18.9 KG/M2 | HEART RATE: 77 BPM | RESPIRATION RATE: 16 BRPM
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
## 2022-06-13 NOTE — DATA REVIEWED
[FreeTextEntry1] : 11/5/21 ([facility]) b/l dx mmg and US: heterogeneously dense. Corresponding to the patient's palpable finding is a 3.9 cm mass in the left breast 1-2 o'clock which is suspicious for malignancy. This would correspond to the area of architectural distortion seen on mammography. Recommend ultrasound-guided core biopsy and clip placement. BI-RADS 4.\par \par 11/9/21 ([facility] path) US bx:1. Left breast 1:00-2:00 5cm fn, core biopsy:  Invasive moderately differentiated mammary carcinoma with associated calcifications - Focal mammary carcinoma in situ. IHC: ESTROGEN RECEPTOR: POSITIVE 45% NUCLEAR STAINING WITH WEAK/MODERATE 1+/2+ INTENSITY PROGESTERONE RECEPTOR: POSITIVE 30% NUCLEAR STAINING WITH MODERATE 2+ INTENSITY HER-2: NEGATIVE (0 MEMBRANOUS STAINING) \par \par 11/23/21 ([facility]) MRI: 1. MR guided core biopsy or clip placement for the right 10:00 axis mass enhancement. 2. Appropriate action for the large left breast cancer.  3. Targeted left axillary ultrasound and possible ultrasound core biopsy based on a palpable abnormality in the left axilla by the referring clinician, which is likely not included on this study. BI-RADS 4. \par  \par 12/9/21 ([facility]) Left axilla US:1. No adenopathy. 2. Palpable finding in the left axillary tail is the posterior extent of the biopsy proven malignancy. BI-RADS 6.\par \par 12/9/21 ([facility]) MRI biopsy not performed: Multiple attempts were made to target the 0.6 cm focus of nonmass enhancement in the right breast. Unfortunately, significant patient motion within the grid, during imaging including between sequences, limited ability to place tissue marker clip, or perform biopsy.Given canceled biopsy recommend targeted right breast ultrasound. If no sonographic correlate is present, then short interval follow-up, 4 months, would be recommended. \par \par 2/1/22 ([facility]) Surgical path: 1. Breast, left, retroareolar tissue; excision: - Benign breast tissue. 2. Breast, left, new anterior margin; excision: - Microinvasive carcinoma (1.0 mm) involving inked margin, in a background of atypical lobular hyperplasia (ALH), aprocrine metaplasia and fibrocystic changes - See Note.\par \par 2/8/22 ([facility]) Surgical path: 1. Breast and capsule, left; mastectomy- Invasive ductal carcinoma, measuring 4.0 cm grossly, see note and synoptic report - Ductal carcinoma in situ (DCIS), cribriform type with intermediate nuclear grade\par - Margins of invasive carcinoma: Anterior at lower outer quadrant: less than 1 mm Posterior: less than 1 mm - Margins of DCIS: Posterior: 2 mm Anterior: 5 mm - Lobular carcinoma in situ (LCIS) - Positive for lymphovascular invasion - Biopsy site changes - Benign skeletal muscle and capsule - Calcifications associated with invasive carcinoma, in situ carcinoma and benign epithelium\par 2. Posterior margin, left breast, excision: - Benign fibroadipose tissue\par 3. Lymph node, left axilla, sentinel, excision: - One out of two lymph nodes positive for metastatic carcinoma (1/2) - Metastatic focus measures 4 mm - Negative for extranodal extension \par 4. Breast, left, new anterior margin, 2:00 1 cm FN; excision: - Benign breast tissue \par 5. Breast, left, new anterior margin, 5:30 6 cm; excision: - Benign breast tissue\par  6. Breast, left, mastectomy skin; excision: - Benign skin with changes of prior procedure

## 2022-06-13 NOTE — HISTORY OF PRESENT ILLNESS
[FreeTextEntry1] : 52 yo female presents for follow up, most recently s/p right mastectomy, right SLNB and bilateral TE placement (by Dr. Barry) on 3/10/22; immediate post-op course was complicated by right breast hematoma s/p evacuation and washout and removal of TE. Final surgical pathology of right breast showed IDP and LCIS and 0/2+ SLNs. \par \par Patient s/p left breast mastectomy, left SLNB on 2/8/22 without reconstruction, for left breast invasive mammary carcinoma s/ focal mammary carcinoma in situ (ER+ SD+ HER2 negative). The patient was planned for b/l mastectomy w/ TE recon on 2/1/22, with anaphylactic reaction. Final surgical path from 2/8/22 revealed IDC with 1/2 LN positive. Oncoptye is 13. She was receiving Zoladex;however due to insurance her course was changed to Lupron injections and is taking Anastrozole managed by Dr. Marlow. She discontinued Anastrozole on 6/3/2022 in preparation for radiation treatment. She underwent her CT simulation, was prepared to start XRT on 5/11/22, but was delayed due to insurance denial reasons. She states she has a verification She plans to start XRT with Dr. Franklin on 6/8/2022. \par \par The patient will be attending the breast cancer support group starting today 6/6/22 at [facility]. \par \par The patient met with allergist and it was determined that isosulfan blue was the cause of her anaphylactic reaction.

## 2022-06-13 NOTE — PHYSICAL EXAM
[Normocephalic] : normocephalic [Atraumatic] : atraumatic [Supple] : supple [No Supraclavicular Adenopathy] : no supraclavicular adenopathy [Examined in the supine and seated position] : examined in the supine and seated position [No dominant masses] : no dominant masses in right breast  [No Nipple Retraction] : no left nipple retraction [No Nipple Discharge] : no left nipple discharge [No Axillary Lymphadenopathy] : no left axillary lymphadenopathy [No Edema] : no edema [No Rashes] : no rashes [No Ulceration] : no ulceration [de-identified] : bilateral mastectomy flaps healing well, b/l JAKE drains in place with SS drainage

## 2022-06-13 NOTE — PHYSICAL EXAM
[Thin] : thin [Normal] : oriented to person, place and time, the affect was normal, the mood was normal and not anxious [de-identified] : BMI 18  Wt 135 lb [de-identified] : Right implant removed.  Left breast reconstruction: no skin changes to date.

## 2022-06-13 NOTE — DISEASE MANAGEMENT
[Pathological] : TNM Stage: p [II] : II [TTNM] : 2 [NTNM] : 1 [MTNM] : 0 [de-identified] : 800cGy [de-identified] : 5000cGy [de-identified] : Left breast

## 2022-06-13 NOTE — VITALS
[90: Able to carry normal activity; minor signs or symptoms of disease.] : 90: Able to carry normal activity; minor signs or symptoms of disease.  [ECOG Performance Status: 1 - Restricted in physically strenuous activity but ambulatory and able to carry out work of a light or sedentary nature] : Performance Status: 1 - Restricted in physically strenuous activity but ambulatory and able to carry out work of a light or sedentary nature, e.g., light house work, office work [Maximal Pain Intensity: 1/10] : 1/10 [Least Pain Intensity: 1/10] : 1/10

## 2022-06-13 NOTE — PAST MEDICAL HISTORY
[Menarche Age ____] : age at menarche was [unfilled] [Total Preg ___] : G[unfilled] [Living ___] : Living: [unfilled] [Definite ___ (Date)] : the last menstrual period was [unfilled] [Regular Cycle Intervals] : have been regular [AB Spont ___] : miscarriages: [unfilled]  [History of Hormone Replacement Treatment] : has no history of hormone replacement treatment [FreeTextEntry5] : No [FreeTextEntry6] : No [FreeTextEntry7] : Yes [FreeTextEntry8] : No

## 2022-06-13 NOTE — REVIEW OF SYSTEMS
[Edema Limbs: Grade 0] : Edema Limbs: Grade 0  [Fatigue: Grade 0] : Fatigue: Grade 0 [Localized Edema: Grade 0] : Localized Edema: Grade 0  [Neck Edema: Grade 0] : Neck Edema: Grade 0 [Anxiety: Grade 1 - Mild symptoms; intervention not indicated] : Anxiety: Grade 1 - Mild symptoms; intervention not indicated [Depression: Grade 1 - Mild depressive symptoms] : Depression: Grade 1 - Mild depressive symptoms [Insomnia: Grade 0] : Insomnia: Grade 0 [Libido Decreased: Grade 0] : Libido Decreased: Grade 0 [Alopecia: Grade 0] : Alopecia: Grade 0 [Pruritus: Grade 0] : Pruritus: Grade 0 [Skin Atrophy: Grade 0] : Skin Atrophy: Grade 0 [Skin Hyperpigmentation: Grade 0] : Skin Hyperpigmentation: Grade 0 [Skin Induration: Grade 0] : Skin Induration: Grade 0 [Negative] : Neurological [de-identified] : surgical scars in bilateral chest wall

## 2022-06-15 ENCOUNTER — OUTPATIENT (OUTPATIENT)
Dept: OUTPATIENT SERVICES | Facility: HOSPITAL | Age: 52
LOS: 1 days | End: 2022-06-15
Payer: COMMERCIAL

## 2022-06-15 ENCOUNTER — APPOINTMENT (OUTPATIENT)
Dept: INFUSION THERAPY | Facility: CLINIC | Age: 52
End: 2022-06-15

## 2022-06-15 ENCOUNTER — NON-APPOINTMENT (OUTPATIENT)
Age: 52
End: 2022-06-15

## 2022-06-15 ENCOUNTER — APPOINTMENT (OUTPATIENT)
Dept: OBGYN | Facility: CLINIC | Age: 52
End: 2022-06-15
Payer: MEDICAID

## 2022-06-15 ENCOUNTER — LABORATORY RESULT (OUTPATIENT)
Age: 52
End: 2022-06-15

## 2022-06-15 VITALS
RESPIRATION RATE: 17 BRPM | HEART RATE: 81 BPM | TEMPERATURE: 99 F | HEIGHT: 71 IN | WEIGHT: 143.96 LBS | DIASTOLIC BLOOD PRESSURE: 77 MMHG | SYSTOLIC BLOOD PRESSURE: 122 MMHG | OXYGEN SATURATION: 98 %

## 2022-06-15 VITALS — DIASTOLIC BLOOD PRESSURE: 80 MMHG | WEIGHT: 134 LBS | BODY MASS INDEX: 18.69 KG/M2 | SYSTOLIC BLOOD PRESSURE: 130 MMHG

## 2022-06-15 DIAGNOSIS — C50.919 MALIGNANT NEOPLASM OF UNSPECIFIED SITE OF UNSPECIFIED FEMALE BREAST: ICD-10-CM

## 2022-06-15 DIAGNOSIS — Z98.82 BREAST IMPLANT STATUS: Chronic | ICD-10-CM

## 2022-06-15 DIAGNOSIS — Z00.00 ENCOUNTER FOR GENERAL ADULT MEDICAL EXAMINATION W/OUT ABNORMAL FINDINGS: ICD-10-CM

## 2022-06-15 DIAGNOSIS — N88.8 OTHER SPECIFIED NONINFLAMMATORY DISORDERS OF CERVIX UTERI: Chronic | ICD-10-CM

## 2022-06-15 DIAGNOSIS — Z86.018 PERSONAL HISTORY OF OTHER BENIGN NEOPLASM: Chronic | ICD-10-CM

## 2022-06-15 PROCEDURE — 99386 PREV VISIT NEW AGE 40-64: CPT

## 2022-06-15 PROCEDURE — 96402 CHEMO HORMON ANTINEOPL SQ/IM: CPT

## 2022-06-15 RX ADMIN — Medication 7.5 MILLIGRAM(S): at 15:31

## 2022-06-15 NOTE — PHYSICAL EXAM
[Appropriately responsive] : appropriately responsive [Alert] : alert [No Acute Distress] : no acute distress [Soft] : soft [Non-tender] : non-tender [Non-distended] : non-distended [No Lesions] : no lesions [No Mass] : no mass [Oriented x3] : oriented x3 [Labia Majora] : normal [Labia Minora] : normal [Normal] : normal [Uterine Adnexae] : normal [Chaperone Present] : A chaperone was present in the examining room during all aspects of the physical examination [No Lymphadenopathy] : no lymphadenopathy

## 2022-06-16 ENCOUNTER — NON-APPOINTMENT (OUTPATIENT)
Age: 52
End: 2022-06-16

## 2022-06-16 LAB
SOURCE AMPLIFICATION: NORMAL
T VAGINALIS RRNA SPEC QL NAA+PROBE: NOT DETECTED

## 2022-06-17 ENCOUNTER — NON-APPOINTMENT (OUTPATIENT)
Age: 52
End: 2022-06-17

## 2022-06-17 LAB
C TRACH RRNA SPEC QL NAA+PROBE: NOT DETECTED
HPV HIGH+LOW RISK DNA PNL CVX: DETECTED
N GONORRHOEA RRNA SPEC QL NAA+PROBE: NOT DETECTED
SOURCE TP AMPLIFICATION: NORMAL

## 2022-06-20 ENCOUNTER — NON-APPOINTMENT (OUTPATIENT)
Age: 52
End: 2022-06-20

## 2022-06-20 NOTE — PHYSICAL EXAM
[Thin] : thin [Normal] : oriented to person, place and time, the affect was normal, the mood was normal and not anxious [de-identified] : BMI 18  Wt 135 lb [de-identified] : Right implant removed.  Left breast reconstruction: grade 1 erythema noted.

## 2022-06-20 NOTE — DISEASE MANAGEMENT
[Pathological] : TNM Stage: p [II] : II [TTNM] : 2 [NTNM] : 1 [MTNM] : 0 [de-identified] : 8999cGy [de-identified] : 5000cGy [de-identified] : Left breast
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
## 2022-06-20 NOTE — PLAN
[FreeTextEntry1] : Patient declined breast exam today. \par TV[facility] referral given to patient.\par Pap and STD testing sent\par RTO in 1 year for annual exam or PRN
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
## 2022-06-20 NOTE — REVIEW OF SYSTEMS
[Edema Limbs: Grade 0] : Edema Limbs: Grade 0  [Fatigue: Grade 0] : Fatigue: Grade 0 [Localized Edema: Grade 0] : Localized Edema: Grade 0  [Neck Edema: Grade 0] : Neck Edema: Grade 0 [Anxiety: Grade 1 - Mild symptoms; intervention not indicated] : Anxiety: Grade 1 - Mild symptoms; intervention not indicated [Depression: Grade 1 - Mild depressive symptoms] : Depression: Grade 1 - Mild depressive symptoms [Insomnia: Grade 0] : Insomnia: Grade 0 [Libido Decreased: Grade 0] : Libido Decreased: Grade 0 [Alopecia: Grade 0] : Alopecia: Grade 0 [Pruritus: Grade 0] : Pruritus: Grade 0 [Skin Atrophy: Grade 0] : Skin Atrophy: Grade 0 [Skin Hyperpigmentation: Grade 0] : Skin Hyperpigmentation: Grade 0 [Skin Induration: Grade 0] : Skin Induration: Grade 0 [Negative] : Neurological [de-identified] : surgical scars in bilateral chest wall

## 2022-06-27 ENCOUNTER — NON-APPOINTMENT (OUTPATIENT)
Age: 52
End: 2022-06-27

## 2022-06-27 DIAGNOSIS — S63.90XA SPRAIN OF UNSPECIFIED PART OF UNSPECIFIED WRIST AND HAND, INITIAL ENCOUNTER: ICD-10-CM

## 2022-06-27 NOTE — REVIEW OF SYSTEMS
[Edema Limbs: Grade 0] : Edema Limbs: Grade 0  [Fatigue: Grade 0] : Fatigue: Grade 0 [Localized Edema: Grade 0] : Localized Edema: Grade 0  [Neck Edema: Grade 0] : Neck Edema: Grade 0 [Anxiety: Grade 1 - Mild symptoms; intervention not indicated] : Anxiety: Grade 1 - Mild symptoms; intervention not indicated [Depression: Grade 1 - Mild depressive symptoms] : Depression: Grade 1 - Mild depressive symptoms [Insomnia: Grade 0] : Insomnia: Grade 0 [Libido Decreased: Grade 0] : Libido Decreased: Grade 0 [Alopecia: Grade 0] : Alopecia: Grade 0 [Pruritus: Grade 0] : Pruritus: Grade 0 [Skin Atrophy: Grade 0] : Skin Atrophy: Grade 0 [Skin Hyperpigmentation: Grade 0] : Skin Hyperpigmentation: Grade 0 [Skin Induration: Grade 0] : Skin Induration: Grade 0 [Negative] : Neurological [de-identified] : surgical scars in bilateral chest wall

## 2022-06-27 NOTE — DISEASE MANAGEMENT
[Pathological] : TNM Stage: p [II] : II [TTNM] : 2 [NTNM] : 1 [MTNM] : 0 [de-identified] : 2800cGy [de-identified] : 5000cGy [de-identified] : Left breast

## 2022-06-27 NOTE — PHYSICAL EXAM
[Thin] : thin [Normal] : oriented to person, place and time, the affect was normal, the mood was normal and not anxious [de-identified] : BMI 18  Wt 135 lb [de-identified] : Right implant removed.  Left breast reconstruction: grade 1 erythema noted (barely) [de-identified] : Right thumb pain (denies trauma)
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
## 2022-07-01 ENCOUNTER — OUTPATIENT (OUTPATIENT)
Dept: OUTPATIENT SERVICES | Facility: HOSPITAL | Age: 52
LOS: 1 days | End: 2022-07-01
Payer: COMMERCIAL

## 2022-07-01 DIAGNOSIS — Z86.018 PERSONAL HISTORY OF OTHER BENIGN NEOPLASM: Chronic | ICD-10-CM

## 2022-07-01 DIAGNOSIS — Z98.82 BREAST IMPLANT STATUS: Chronic | ICD-10-CM

## 2022-07-01 DIAGNOSIS — N88.8 OTHER SPECIFIED NONINFLAMMATORY DISORDERS OF CERVIX UTERI: Chronic | ICD-10-CM

## 2022-07-01 PROCEDURE — 73120 X-RAY EXAM OF HAND: CPT | Mod: 26,RT

## 2022-07-01 PROCEDURE — 73120 X-RAY EXAM OF HAND: CPT

## 2022-07-06 ENCOUNTER — APPOINTMENT (OUTPATIENT)
Dept: HEMATOLOGY ONCOLOGY | Facility: CLINIC | Age: 52
End: 2022-07-06

## 2022-07-06 ENCOUNTER — LABORATORY RESULT (OUTPATIENT)
Age: 52
End: 2022-07-06

## 2022-07-06 ENCOUNTER — NON-APPOINTMENT (OUTPATIENT)
Age: 52
End: 2022-07-06

## 2022-07-06 VITALS
DIASTOLIC BLOOD PRESSURE: 82 MMHG | WEIGHT: 134 LBS | OXYGEN SATURATION: 99 % | HEIGHT: 71 IN | HEART RATE: 78 BPM | TEMPERATURE: 97.5 F | BODY MASS INDEX: 18.76 KG/M2 | SYSTOLIC BLOOD PRESSURE: 125 MMHG | RESPIRATION RATE: 18 BRPM

## 2022-07-06 PROCEDURE — 36415 COLL VENOUS BLD VENIPUNCTURE: CPT

## 2022-07-06 PROCEDURE — 99214 OFFICE O/P EST MOD 30 MIN: CPT | Mod: 25

## 2022-07-06 NOTE — DISEASE MANAGEMENT
[Pathological] : TNM Stage: p [II] : II [TTNM] : 2 [NTNM] : 1 [de-identified] : 4000cGy [MTNM] : 0 [de-identified] : 5000cGy [de-identified] : Left breast

## 2022-07-06 NOTE — PHYSICAL EXAM
[Thin] : thin [Normal] : oriented to person, place and time, the affect was normal, the mood was normal and not anxious [de-identified] : BMI 18  Wt 135 lb [de-identified] : Right implant removed.  There is a confluent erythema now over the field, but still grade 1.  [de-identified] : The hand x-ray was only DJD.

## 2022-07-06 NOTE — REVIEW OF SYSTEMS
[Edema Limbs: Grade 0] : Edema Limbs: Grade 0  [Fatigue: Grade 0] : Fatigue: Grade 0 [Localized Edema: Grade 0] : Localized Edema: Grade 0  [Neck Edema: Grade 0] : Neck Edema: Grade 0 [Anxiety: Grade 1 - Mild symptoms; intervention not indicated] : Anxiety: Grade 1 - Mild symptoms; intervention not indicated [Depression: Grade 1 - Mild depressive symptoms] : Depression: Grade 1 - Mild depressive symptoms [Insomnia: Grade 0] : Insomnia: Grade 0 [Libido Decreased: Grade 0] : Libido Decreased: Grade 0 [Alopecia: Grade 0] : Alopecia: Grade 0 [Pruritus: Grade 0] : Pruritus: Grade 0 [Skin Atrophy: Grade 0] : Skin Atrophy: Grade 0 [Skin Hyperpigmentation: Grade 0] : Skin Hyperpigmentation: Grade 0 [Skin Induration: Grade 0] : Skin Induration: Grade 0 [Negative] : Neurological [de-identified] : surgical scars in bilateral chest wall

## 2022-07-07 LAB
ALBUMIN SERPL ELPH-MCNC: 4.2 G/DL
ALP BLD-CCNC: 67 U/L
ALT SERPL-CCNC: 15 U/L
ANION GAP SERPL CALC-SCNC: 20 MMOL/L
AST SERPL-CCNC: 21 U/L
BASOPHILS # BLD AUTO: 0.04 K/UL
BASOPHILS NFR BLD AUTO: 0.9 %
BILIRUB SERPL-MCNC: 0.4 MG/DL
BUN SERPL-MCNC: 7 MG/DL
CALCIUM SERPL-MCNC: 9.6 MG/DL
CANCER AG27-29 SERPL-ACNC: 18.9 U/ML
CEA SERPL-MCNC: 1.5 NG/ML
CHLORIDE SERPL-SCNC: 100 MMOL/L
CO2 SERPL-SCNC: 21 MMOL/L
CREAT SERPL-MCNC: 0.49 MG/DL
CRP SERPL-MCNC: <3 MG/L
EGFR: 113 ML/MIN/1.73M2
EOSINOPHIL # BLD AUTO: 0.12 K/UL
EOSINOPHIL NFR BLD AUTO: 2.6 %
ERYTHROCYTE [SEDIMENTATION RATE] IN BLOOD BY WESTERGREN METHOD: 29 MM/HR
GLUCOSE SERPL-MCNC: 29 MG/DL
HCT VFR BLD CALC: 42.5 %
HGB BLD-MCNC: 13.8 G/DL
LDH SERPL-CCNC: 192 U/L
LYMPHOCYTES # BLD AUTO: 0.15 K/UL
LYMPHOCYTES NFR BLD AUTO: 3.4 %
MAGNESIUM SERPL-MCNC: 1.9 MG/DL
MAN DIFF?: NORMAL
MCHC RBC-ENTMCNC: 30.1 PG
MCHC RBC-ENTMCNC: 32.5 GM/DL
MCV RBC AUTO: 92.8 FL
MONOCYTES # BLD AUTO: 0.27 K/UL
MONOCYTES NFR BLD AUTO: 6 %
NEUTROPHILS # BLD AUTO: 3.87 K/UL
NEUTROPHILS NFR BLD AUTO: 86.2 %
PHOSPHATE SERPL-MCNC: 3.2 MG/DL
PLATELET # BLD AUTO: 241 K/UL
POTASSIUM SERPL-SCNC: 3.6 MMOL/L
PROT SERPL-MCNC: 7.4 G/DL
RBC # BLD: 4.58 M/UL
RBC # FLD: 16.4 %
SODIUM SERPL-SCNC: 142 MMOL/L
URATE SERPL-MCNC: 4.8 MG/DL
WBC # FLD AUTO: 4.44 K/UL

## 2022-07-11 ENCOUNTER — NON-APPOINTMENT (OUTPATIENT)
Age: 52
End: 2022-07-11

## 2022-07-11 LAB — CYTOLOGY CVX/VAG DOC THIN PREP: ABNORMAL

## 2022-07-11 NOTE — REVIEW OF SYSTEMS
[Edema Limbs: Grade 0] : Edema Limbs: Grade 0  [Fatigue: Grade 0] : Fatigue: Grade 0 [Cough: Grade 0] : Cough: Grade 0 [Dyspnea: Grade 0] : Dyspnea: Grade 0 [Hiccups: Grade 0] : Hiccups: Grade 0 [Hoarseness: Grade 0] : Hoarseness: Grade 0 [Alopecia: Grade 0] : Alopecia: Grade 0 [Pruritus: Grade 1 - Mild or localized; topical intervention indicated] : Pruritus: Grade 1 - Mild or localized; topical intervention indicated [Skin Atrophy: Grade 0] : Skin Atrophy: Grade 0
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
## 2022-07-12 NOTE — DISEASE MANAGEMENT
[Pathological] : TNM Stage: p [II] : II [TTNM] : 2 [NTNM] : 1 [MTNM] : 0 [de-identified] : 6250cGy [de-identified] : 5000cGy [de-identified] : Left chest wall and nodes

## 2022-07-12 NOTE — PHYSICAL EXAM
[Thin] : thin [Normal] : oriented to person, place and time, the affect was normal, the mood was normal and not anxious [de-identified] : Confluent erythema on left chest wall.

## 2022-07-13 ENCOUNTER — OUTPATIENT (OUTPATIENT)
Dept: OUTPATIENT SERVICES | Facility: HOSPITAL | Age: 52
LOS: 1 days | End: 2022-07-13
Payer: COMMERCIAL

## 2022-07-13 ENCOUNTER — APPOINTMENT (OUTPATIENT)
Dept: INFUSION THERAPY | Facility: CLINIC | Age: 52
End: 2022-07-13

## 2022-07-13 VITALS
TEMPERATURE: 99 F | HEART RATE: 75 BPM | DIASTOLIC BLOOD PRESSURE: 81 MMHG | RESPIRATION RATE: 17 BRPM | OXYGEN SATURATION: 97 % | SYSTOLIC BLOOD PRESSURE: 137 MMHG

## 2022-07-13 DIAGNOSIS — Z98.82 BREAST IMPLANT STATUS: Chronic | ICD-10-CM

## 2022-07-13 DIAGNOSIS — Z86.018 PERSONAL HISTORY OF OTHER BENIGN NEOPLASM: Chronic | ICD-10-CM

## 2022-07-13 DIAGNOSIS — C50.919 MALIGNANT NEOPLASM OF UNSPECIFIED SITE OF UNSPECIFIED FEMALE BREAST: ICD-10-CM

## 2022-07-13 DIAGNOSIS — N88.8 OTHER SPECIFIED NONINFLAMMATORY DISORDERS OF CERVIX UTERI: Chronic | ICD-10-CM

## 2022-07-13 PROCEDURE — 96402 CHEMO HORMON ANTINEOPL SQ/IM: CPT

## 2022-07-13 RX ADMIN — Medication 7.5 MILLIGRAM(S): at 15:55

## 2022-07-22 ENCOUNTER — NON-APPOINTMENT (OUTPATIENT)
Age: 52
End: 2022-07-22
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
## 2022-08-01 NOTE — PHYSICAL EXAM
[Normal] : oriented to person, place and time, the affect was normal, the mood was normal and not anxious [de-identified] : Bilateral nipple sparing mastectomies.  Bilateral drains in place.  No suspicious findings for recurrence.

## 2022-08-08 ENCOUNTER — APPOINTMENT (OUTPATIENT)
Dept: OBGYN | Facility: CLINIC | Age: 52
End: 2022-08-08

## 2022-08-08 VITALS — SYSTOLIC BLOOD PRESSURE: 110 MMHG | DIASTOLIC BLOOD PRESSURE: 80 MMHG

## 2022-08-08 DIAGNOSIS — R87.612 LOW GRADE SQUAMOUS INTRAEPITHELIAL LESION ON CYTOLOGIC SMEAR OF CERVIX (LGSIL): ICD-10-CM

## 2022-08-08 DIAGNOSIS — R87.619 UNSPECIFIED ABNORMAL CYTOLOGICAL FINDINGS IN SPECIMENS FROM CERVIX UTERI: ICD-10-CM

## 2022-08-08 PROCEDURE — 57454 BX/CURETT OF CERVIX W/SCOPE: CPT

## 2022-08-09 PROBLEM — R87.612 LOW GRADE SQUAMOUS INTRAEPITHELIAL LESION (LGSIL) ON CERVICAL PAP SMEAR: Status: ACTIVE | Noted: 2022-08-08

## 2022-08-09 NOTE — ASSESSMENT
[FreeTextEntry1] : 51yo postmenopausal  w/ known hx of breast cancer presents for colposcopy for recent pap smear with LSIL and high risk HPV (untyped). Of note, patient had a hx of abnormal pap smear s/p LEEP at around 30yo, which was wnl.\par \par obhx: hx SAB\par Gynhx: genital warts. Previously had LEEP at around 30yo which was wnl.\par PMH: breast cancer\par PSH: LEEP at 30yo, hx breast implants, b/l mastectomy\par meds: anastrazole 1mg qd, leuprolide q28d\par all: isosulfan blue (anaphylaxis)\par \par \par Colposcopy performed today, bx taken at the 2 o'clock (area appeared erythematous, small amount of bleeding, with acetowhite changes), 6 o'clock, and ECC, will f/u results. \par - exam performed with attending Dr. Xavier

## 2022-08-09 NOTE — END OF VISIT
[] : Resident [Resident] : Resident [FreeTextEntry2] : Agree with the above. S/p colposcopy for LSIL, HPV +; adequate colpo overall impression benign / CIN1. Two biopsies and ECC done.\par \par Lin Xavier MD

## 2022-08-09 NOTE — PROCEDURE
[Colposcopy] : Colposcopy  [Consent Obtained] : Consent obtained [Risks] : risks [Benefits] : benefits [Alternatives] : alternatives [LGSIL] : LGSIL [HPV High Risk] : HPV high risk [Colposcopy Adequate] : colposcopy adequate [Pap Performed] : pap not performed [SCI Fully Visualized] : SCI fully visualized [ECC Performed] : ECC performed [No Abnormalities] : no abnormalities [Biopsy] : biopsy taken [Hemostasis Obtained] : Hemostasis obtained [Tolerated Well] : the patient tolerated the procedure well [de-identified] : 2 [de-identified] : 2 o clock (AW changes)\par 6 o clock (random) [de-identified] : Benign or CIN1

## 2022-08-10 ENCOUNTER — OUTPATIENT (OUTPATIENT)
Dept: OUTPATIENT SERVICES | Facility: HOSPITAL | Age: 52
LOS: 1 days | End: 2022-08-10
Payer: COMMERCIAL

## 2022-08-10 ENCOUNTER — APPOINTMENT (OUTPATIENT)
Dept: INFUSION THERAPY | Facility: CLINIC | Age: 52
End: 2022-08-10

## 2022-08-10 VITALS
RESPIRATION RATE: 18 BRPM | OXYGEN SATURATION: 100 % | SYSTOLIC BLOOD PRESSURE: 108 MMHG | TEMPERATURE: 97 F | DIASTOLIC BLOOD PRESSURE: 70 MMHG | HEART RATE: 66 BPM

## 2022-08-10 DIAGNOSIS — N88.8 OTHER SPECIFIED NONINFLAMMATORY DISORDERS OF CERVIX UTERI: Chronic | ICD-10-CM

## 2022-08-10 DIAGNOSIS — Z98.82 BREAST IMPLANT STATUS: Chronic | ICD-10-CM

## 2022-08-10 DIAGNOSIS — C50.919 MALIGNANT NEOPLASM OF UNSPECIFIED SITE OF UNSPECIFIED FEMALE BREAST: ICD-10-CM

## 2022-08-10 DIAGNOSIS — Z86.018 PERSONAL HISTORY OF OTHER BENIGN NEOPLASM: Chronic | ICD-10-CM

## 2022-08-10 PROCEDURE — 96402 CHEMO HORMON ANTINEOPL SQ/IM: CPT

## 2022-08-10 RX ADMIN — Medication 7.5 MILLIGRAM(S): at 14:50

## 2022-08-15 ENCOUNTER — APPOINTMENT (OUTPATIENT)
Dept: RADIATION ONCOLOGY | Facility: CLINIC | Age: 52
End: 2022-08-15

## 2022-08-15 LAB — CORE LAB BIOPSY: NORMAL

## 2022-08-16 ENCOUNTER — APPOINTMENT (OUTPATIENT)
Dept: RADIATION ONCOLOGY | Facility: CLINIC | Age: 52
End: 2022-08-16

## 2022-08-16 PROCEDURE — 99024 POSTOP FOLLOW-UP VISIT: CPT

## 2022-08-27 NOTE — PHYSICAL EXAM
[Fully active, able to carry on all pre-disease performance without restriction] : Status 0 - Fully active, able to carry on all pre-disease performance without restriction [Normal] : affect appropriate [de-identified] : b/l mastectomy - left breast expanded, right no expander

## 2022-08-27 NOTE — ASSESSMENT
[FreeTextEntry1] : 50 yo premenopausal Polish woman ( fluent in English and Polish) referred by Dr. Elise for evaluation of left breast cancer.   Biopsy revealed IDC ESTROGEN RECEPTOR: POSITIVE 45% NUCLEAR STAINING WITH WEAK/MODERATE 1+/2+ INTENSITY PROGESTERONE RECEPTOR: POSITIVE 30% NUCLEAR STAINING WITH MODERATE 2+ INTENSITY HER-2: NEGATIVE (0 MEMBRANOUS STAINING) KI 67 35 %.\par \par Imaging studies with left breast mass in US 3.9 x 1.1 x 1.2 cm, MRI max dimension 5.3 cm.\par Right breast lesion - biopsy unsuccessful.\par \par Patient decided on bilateral mastectomy.  She understand indication for systemic chemotherapy based on lymph node status and molecular testing. 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 Reviewed protocols of chemotherapy including ACdd> Tweekly, TC and CMF with the last one with lowest incidence of hair loss.\par Mammaprint results c/w low recurrence score, reviewed with patient if LN positive given the fact that she is premenopausal she might have indication for adjuvant chemotherapy. If LN negative plan for AI. \par Zoladex 3.6 mg today, reviewed side effects. \par \par Patient underwent left sided mastectomy - pathology report reviewed, IDC 4 cm, T2, N1, with LVI.\par \par Patient underwent right sided mastectomy and b/l placement of the expanders, her postop course was complicated by bleeding from right side.\par \par She feels depressed and anxious - had 4 surgeries in the last 6 weeks, 1st one with anaphylactic reaction to MB, left breast mastectomy, followed by right breast mastectomy, complicated by bleeding.  S\par Feels tired and was unable to sleep at night well.\par \par Continue Zoladex since 3/23/2022\par Right breast mastectomy - pathology reviewed 3/23/2022- papilloma and ADH\par \par Anemia - blood loss-, start PO iron , ferrous gluconate PO daily in am. Hg 10.5. \par \par RX lexapro- start with 5 mg, will increase to 10 mg.\par She has limited social support, lives by herself, father and mother ( dementia)  in [place],  referred to , would benefit from psychosocial support.  She is very hesitant about chemotherapy since loosing her her will affect her livelihood. \par Oncotype Dx 13, distant risk of recurrence 13%, with no benefit from chemotherapy. \par \par Patient presented in multidisciplinary TB, offered OS to continue with anastrozole.  RX send today. Patient to follow up with rad onc to start RT.  She was educated to hold anastrozole during RT.\par Blood drawn in office. Ordered and reviewed.\par \par Continue ovarian suppression Zoladex, > change to Lupron 3.75 mg q 28 days as per insurance\par Start Anastrozole 1 mg  PO daily- hold during radiation. \par Cleared to start radiation\par \par \par \par OVT 4 weeks \par Labs next visit - CBC, CMP, ferritin \par \par \par
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
## 2022-09-07 ENCOUNTER — NON-APPOINTMENT (OUTPATIENT)
Age: 52
End: 2022-09-07

## 2022-09-07 ENCOUNTER — APPOINTMENT (OUTPATIENT)
Dept: INFUSION THERAPY | Facility: CLINIC | Age: 52
End: 2022-09-07

## 2022-09-12 ENCOUNTER — APPOINTMENT (OUTPATIENT)
Dept: PLASTIC SURGERY | Facility: CLINIC | Age: 52
End: 2022-09-12

## 2022-09-12 VITALS — WEIGHT: 134 LBS | BODY MASS INDEX: 18.76 KG/M2 | HEART RATE: 69 BPM | HEIGHT: 71 IN | OXYGEN SATURATION: 97 %

## 2022-09-12 PROCEDURE — 99213 OFFICE O/P EST LOW 20 MIN: CPT

## 2022-09-13 ENCOUNTER — NON-APPOINTMENT (OUTPATIENT)
Age: 52
End: 2022-09-13

## 2022-09-14 ENCOUNTER — APPOINTMENT (OUTPATIENT)
Dept: INFUSION THERAPY | Facility: CLINIC | Age: 52
End: 2022-09-14

## 2022-09-14 ENCOUNTER — LABORATORY RESULT (OUTPATIENT)
Age: 52
End: 2022-09-14

## 2022-09-14 ENCOUNTER — OUTPATIENT (OUTPATIENT)
Dept: OUTPATIENT SERVICES | Facility: HOSPITAL | Age: 52
LOS: 1 days | End: 2022-09-14
Payer: COMMERCIAL

## 2022-09-14 ENCOUNTER — APPOINTMENT (OUTPATIENT)
Dept: HEMATOLOGY ONCOLOGY | Facility: CLINIC | Age: 52
End: 2022-09-14

## 2022-09-14 VITALS
OXYGEN SATURATION: 99 % | SYSTOLIC BLOOD PRESSURE: 100 MMHG | DIASTOLIC BLOOD PRESSURE: 71 MMHG | TEMPERATURE: 97 F | RESPIRATION RATE: 18 BRPM | HEART RATE: 70 BPM

## 2022-09-14 VITALS
BODY MASS INDEX: 18.76 KG/M2 | OXYGEN SATURATION: 97 % | TEMPERATURE: 97.8 F | HEART RATE: 78 BPM | RESPIRATION RATE: 18 BRPM | DIASTOLIC BLOOD PRESSURE: 77 MMHG | WEIGHT: 134 LBS | HEIGHT: 71 IN | SYSTOLIC BLOOD PRESSURE: 134 MMHG

## 2022-09-14 DIAGNOSIS — N88.8 OTHER SPECIFIED NONINFLAMMATORY DISORDERS OF CERVIX UTERI: Chronic | ICD-10-CM

## 2022-09-14 DIAGNOSIS — C50.919 MALIGNANT NEOPLASM OF UNSPECIFIED SITE OF UNSPECIFIED FEMALE BREAST: ICD-10-CM

## 2022-09-14 DIAGNOSIS — Z86.018 PERSONAL HISTORY OF OTHER BENIGN NEOPLASM: Chronic | ICD-10-CM

## 2022-09-14 DIAGNOSIS — Z98.82 BREAST IMPLANT STATUS: Chronic | ICD-10-CM

## 2022-09-14 DIAGNOSIS — F43.10 POST-TRAUMATIC STRESS DISORDER, UNSPECIFIED: ICD-10-CM

## 2022-09-14 LAB
CRP SERPL-MCNC: <3 MG/L
ERYTHROCYTE [SEDIMENTATION RATE] IN BLOOD BY WESTERGREN METHOD: 26 MM/HR
LDH SERPL-CCNC: 157 U/L
MAGNESIUM SERPL-MCNC: 2 MG/DL
PHOSPHATE SERPL-MCNC: 4 MG/DL
URATE SERPL-MCNC: 4.7 MG/DL

## 2022-09-14 PROCEDURE — 96402 CHEMO HORMON ANTINEOPL SQ/IM: CPT

## 2022-09-14 PROCEDURE — 99214 OFFICE O/P EST MOD 30 MIN: CPT | Mod: 25

## 2022-09-14 PROCEDURE — 36415 COLL VENOUS BLD VENIPUNCTURE: CPT

## 2022-09-14 RX ORDER — ESCITALOPRAM OXALATE 5 MG/1
5 TABLET ORAL
Qty: 30 | Refills: 0 | Status: COMPLETED | COMMUNITY
Start: 2022-03-23 | End: 2022-09-14

## 2022-09-14 RX ADMIN — Medication 3.75 MILLIGRAM(S): at 15:10

## 2022-09-14 NOTE — ASSESSMENT
[FreeTextEntry1] : 53 yo premenopausal Polish woman ( fluent in English and Polish) referred by Dr. Elise for evaluation of left breast cancer.   Biopsy revealed IDC ESTROGEN RECEPTOR: POSITIVE 45% NUCLEAR STAINING WITH WEAK/MODERATE 1+/2+ INTENSITY PROGESTERONE RECEPTOR: POSITIVE 30% NUCLEAR STAINING WITH MODERATE 2+ INTENSITY HER-2: NEGATIVE (0 MEMBRANOUS STAINING) KI 67 35 %.\par \par Imaging studies with left breast mass in US 3.9 x 1.1 x 1.2 cm, MRI max dimension 5.3 cm.\par Right breast lesion - biopsy unsuccessful.\par \par Patient decided on bilateral mastectomy.  She understand indication for systemic chemotherapy based on lymph node status and molecular testing. 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 Reviewed protocols of chemotherapy including ACdd> Tweekly, TC and CMF with the last one with lowest incidence of hair loss.\par Mammaprint results c/w low recurrence score, reviewed with patient if LN positive given the fact that she is premenopausal she might have indication for adjuvant chemotherapy. If LN negative plan for AI. \par Zoladex 3.6 mg today, reviewed side effects. \par \par Patient underwent left sided mastectomy - pathology report reviewed, IDC 4 cm, T2, N1, with LVI.\par \par Patient underwent right sided mastectomy and b/l placement of the expanders, her postop course was complicated by bleeding from right side.\par \par She feels depressed and anxious - had 4 surgeries in the last 6 weeks, 1st one with anaphylactic reaction to MB, left breast mastectomy, followed by right breast mastectomy, complicated by bleeding.  S\par Feels tired and was unable to sleep at night well.\par \par Continue Zoladex since 3/23/2022\par Right breast mastectomy - pathology reviewed 3/23/2022- papilloma and ADH\par \par Anemia - blood loss-, start PO iron , ferrous gluconate PO daily in am. Hg 10.5. \par \par RX lexapro- start with 5 mg, will increase to 10 mg.\par She has limited social support, lives by herself, father and mother ( dementia)  in [place],  referred to , would benefit from psychosocial support.  She is very hesitant about chemotherapy since loosing her her will affect her livelihood. \par Oncotype Dx 13, distant risk of recurrence 13%, with no benefit from chemotherapy. \par \par Patient presented in multidisciplinary TB, offered OS to continue with anastrozole.  RX send today. Patient to follow up with rad onc to start RT.  She was educated to hold anastrozole during RT.\par Blood drawn in office. Ordered and reviewed.\par \par Continue ovarian suppression Zoladex, > change to Lupron 3.75 mg q 28 days as per insurance\par Start Anastrozole 1 mg  PO daily- hold during radiation. \par S/p radiation July 13, 2022\par \par Tx - Lupron 3.75 mg Q 4 weeks. \par Dexa ordered\par Labs next visit - CBC, CMP, ferritin \par \par \par

## 2022-09-14 NOTE — PHYSICAL EXAM
[Fully active, able to carry on all pre-disease performance without restriction] : Status 0 - Fully active, able to carry on all pre-disease performance without restriction [Normal] : affect appropriate [de-identified] : b/l mastectomy - left breast expanded, right no expander
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
## 2022-09-15 LAB — CEA SERPL-MCNC: 1.6 NG/ML

## 2022-09-16 LAB — CANCER AG27-29 SERPL-ACNC: 20 U/ML

## 2022-09-16 NOTE — SURGICAL HISTORY
[de-identified] : 01/13/22; bilateral tissue expander breast reconstruction [de-identified] : 02/08/22; Mastectomy [de-identified] : 03/10/22; Bilateral T/E Placement [de-identified] : 03/11/22; Right T/E Removal

## 2022-09-16 NOTE — HISTORY OF PRESENT ILLNESS
[FreeTextEntry1] : 53 y/o female s/p bilateral mastectomy and T/E placement on 03/10/22 and s/p right T/E removal on 03/11/22. Denies any f/c/n/v. She completed radiation on 07/19/22. \par \par PE: Left TE in place, incisions well healed, no collections or s/sx of infection,  right chest well healed, now ready for TE placement\par Right surgically abdsent breast.\par Total:\par Left: 420 cc  Ref# [id number] expanders with 500 cc capacity.\par \par Will plan for right TE delayed placement to match the 1st stage reconstruction on left.\par NAture of surgery reviewed with patient. Will coordinate for the near future. \par \par

## 2022-09-19 ENCOUNTER — NON-APPOINTMENT (OUTPATIENT)
Age: 52
End: 2022-09-19

## 2022-09-29 ENCOUNTER — NON-APPOINTMENT (OUTPATIENT)
Age: 52
End: 2022-09-29

## 2022-10-04 ENCOUNTER — APPOINTMENT (OUTPATIENT)
Dept: BREAST CENTER | Facility: CLINIC | Age: 52
End: 2022-10-04

## 2022-10-04 VITALS
BODY MASS INDEX: 18.15 KG/M2 | OXYGEN SATURATION: 98 % | WEIGHT: 134 LBS | HEIGHT: 72 IN | SYSTOLIC BLOOD PRESSURE: 121 MMHG | DIASTOLIC BLOOD PRESSURE: 82 MMHG | HEART RATE: 70 BPM | TEMPERATURE: 97.8 F

## 2022-10-04 DIAGNOSIS — M25.549 PAIN IN JOINTS OF UNSPECIFIED HAND: ICD-10-CM

## 2022-10-04 PROCEDURE — 99213 OFFICE O/P EST LOW 20 MIN: CPT

## 2022-10-05 NOTE — PAST MEDICAL HISTORY
[Menarche Age ____] : age at menarche was [unfilled] [Definite ___ (Date)] : the last menstrual period was [unfilled] [Regular Cycle Intervals] : have been regular [Total Preg ___] : G[unfilled] [Living ___] : Living: [unfilled] [AB Spont ___] : miscarriages: [unfilled]  [History of Hormone Replacement Treatment] : has no history of hormone replacement treatment [FreeTextEntry5] : No [FreeTextEntry6] : No [FreeTextEntry7] : Yes [FreeTextEntry8] : No

## 2022-10-05 NOTE — PHYSICAL EXAM
[Normocephalic] : normocephalic [Atraumatic] : atraumatic [Supple] : supple [No Supraclavicular Adenopathy] : no supraclavicular adenopathy [Examined in the supine and seated position] : examined in the supine and seated position [No dominant masses] : no dominant masses in right breast  [No Nipple Retraction] : no left nipple retraction [No Nipple Discharge] : no left nipple discharge [No Axillary Lymphadenopathy] : no left axillary lymphadenopathy [No Edema] : no edema [No Rashes] : no rashes [No Ulceration] : no ulceration [de-identified] : bilateral mastectomy flaps healing well, b/l JAKE drains in place with SS drainage

## 2022-10-05 NOTE — DATA REVIEWED
[FreeTextEntry1] : 11/5/21 ([facility]) b/l dx mmg and US: heterogeneously dense. Corresponding to the patient's palpable finding is a 3.9 cm mass in the left breast 1-2 o'clock which is suspicious for malignancy. This would correspond to the area of architectural distortion seen on mammography. Recommend ultrasound-guided core biopsy and clip placement. BI-RADS 4.\par \par 11/9/21 ([facility] path) US bx:1. Left breast 1:00-2:00 5cm fn, core biopsy:  Invasive moderately differentiated mammary carcinoma with associated calcifications - Focal mammary carcinoma in situ. IHC: ESTROGEN RECEPTOR: POSITIVE 45% NUCLEAR STAINING WITH WEAK/MODERATE 1+/2+ INTENSITY PROGESTERONE RECEPTOR: POSITIVE 30% NUCLEAR STAINING WITH MODERATE 2+ INTENSITY HER-2: NEGATIVE (0 MEMBRANOUS STAINING) \par \par 11/23/21 ([facility]) MRI: 1. MR guided core biopsy or clip placement for the right 10:00 axis mass enhancement. 2. Appropriate action for the large left breast cancer.  3. Targeted left axillary ultrasound and possible ultrasound core biopsy based on a palpable abnormality in the left axilla by the referring clinician, which is likely not included on this study. BI-RADS 4. \par  \par 12/9/21 ([facility]) Left axilla US:1. No adenopathy. 2. Palpable finding in the left axillary tail is the posterior extent of the biopsy proven malignancy. BI-RADS 6.\par \par 12/9/21 ([facility]) MRI biopsy not performed: Multiple attempts were made to target the 0.6 cm focus of nonmass enhancement in the right breast. Unfortunately, significant patient motion within the grid, during imaging including between sequences, limited ability to place tissue marker clip, or perform biopsy.Given canceled biopsy recommend targeted right breast ultrasound. If no sonographic correlate is present, then short interval follow-up, 4 months, would be recommended. \par \par 2/1/22 ([facility]) Surgical path: 1. Breast, left, retroareolar tissue; excision: - Benign breast tissue. 2. Breast, left, new anterior margin; excision: - Microinvasive carcinoma (1.0 mm) involving inked margin, in a background of atypical lobular hyperplasia (ALH), aprocrine metaplasia and fibrocystic changes - See Note.\par \par 2/8/22 ([facility]) Surgical path: 1. Breast and capsule, left; mastectomy- Invasive ductal carcinoma, measuring 4.0 cm grossly, see note and synoptic report - Ductal carcinoma in situ (DCIS), cribriform type with intermediate nuclear grade\par - Margins of invasive carcinoma: Anterior at lower outer quadrant: less than 1 mm Posterior: less than 1 mm - Margins of DCIS: Posterior: 2 mm Anterior: 5 mm - Lobular carcinoma in situ (LCIS) - Positive for lymphovascular invasion - Biopsy site changes - Benign skeletal muscle and capsule - Calcifications associated with invasive carcinoma, in situ carcinoma and benign epithelium\par 2. Posterior margin, left breast, excision: - Benign fibroadipose tissue\par 3. Lymph node, left axilla, sentinel, excision: - One out of two lymph nodes positive for metastatic carcinoma (1/2) - Metastatic focus measures 4 mm - Negative for extranodal extension \par 4. Breast, left, new anterior margin, 2:00 1 cm FN; excision: - Benign breast tissue \par 5. Breast, left, new anterior margin, 5:30 6 cm; excision: - Benign breast tissue\par  6. Breast, left, mastectomy skin; excision: - Benign skin with changes of prior procedure \par \par 10/4/2022 ([facility]) bilateral axillary US showing A targeted bilateral breast ultrasound was performed.\par FINDINGS:The right axilla appears normal. There are no masses there is no lymphadenopathy. IMPRESSION: Normal right axilla. Clinical correlation and assessment. ASSESSMENT: BI-RADS Category 2:  Benign.
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
## 2022-10-05 NOTE — DISEASE MANAGEMENT
[Pathological] : TNM Stage: p [II] : II [TTNM] : 2 [NTNM] : 1 [MTNM] : 0 [de-identified] : 5000cGy [de-identified] : 5000cGy [de-identified] : Left chest wall and nodes

## 2022-10-05 NOTE — HISTORY OF PRESENT ILLNESS
[FreeTextEntry1] : 53 yo female presents for follow up of left invasive mammary carcinoma with focal mammary carcinoma in situ (ER+ WA+ HER2 negative) s/p left breast mastectomy, left SLNB on 2/8/22 without reconstruction, final surgical pathology from 2/8/22 revealed IDC with 1/2 LN positive. Oncoptye is 13. Patient s/p right mastectomy, right SLNB and bilateral TE placement (by Dr. Barry) on 3/10/22; immediate post-op course was complicated by right breast hematoma s/p evacuation and washout and removal of TE. Final surgical pathology of right breast showed IDP and LCIS and 0/2+ SLNs.  Of note, the patient was originally planned for b/l mastectomy w/ TE reconstruction; however, surgical case in 2/2022 was complicated by an anaphylactic reaction. The patient met with allergist and it was determined that isosulfan blue was the cause of her anaphylactic reaction. Patient was receiving Zoladex;however due to insurance her course was changed to Lupron injections and is taking Anastrozole managed by Dr. Marlow. She discontinued Anastrozole on 6/3/2022 in preparation for radiation treatment. She underwent her CT simulation, was prepared to start XRT on 5/11/22, but was delayed due to insurance denial reasons.She has completed XRT of left chest wall and lymph nodes on 7/13/22 with Dr. Romero. Patient has resumed Anastrazole. \par \par Patient is pending right TE placement by Dr. Barry on 10/14/2022. \par \par Patient is complaining of right foot bone spur of big toe with associated pain, mid-upper back stiffness upon first wakening, and pain and stiffness of movement of right thumb. Patient also reports abnormal thyroid labs, states her PCP recommended she follow up with an Endocrinologist, patient states she prefers to follow up with a [facility] provider. Discussed the indication for PT evaluation of right thumb and mid upper back stiffness, will assist in setting up the referral. Will refer patient to [facility] for Endocrinology and Podiatry consults. \par \par \par

## 2022-10-06 ENCOUNTER — APPOINTMENT (OUTPATIENT)
Dept: PLASTIC SURGERY | Facility: CLINIC | Age: 52
End: 2022-10-06

## 2022-10-06 PROCEDURE — 99213 OFFICE O/P EST LOW 20 MIN: CPT | Mod: 95

## 2022-10-06 NOTE — HISTORY OF PRESENT ILLNESS
[Home] : at home, [unfilled] , at the time of the visit. [Other Location: e.g. Home (Enter Location, City,State)___] : at [unfilled] [Verbal consent obtained from patient] : the patient, [unfilled] [FreeTextEntry1] : Patient Name: MARY REYES \par : 1970 \par Date: 10/06/2022 \par Attending: Dr. Jose R Barry\par \par HPI: preop consultation for delayed right breast tissue expander placement on 10/14/22. \par \par Allergies: isosulfan, environmental\par Medication prescribed: percocet 5-325 mg, valium 5 mg\par \par ROS: complete 14 point review of systems negative except pertinent items reviewed in the HPI. Other non-contributory items reviewed in our new patient questionnaire and we have submitted it to be scanned into the medical record. \par \par Physical Exam: \par completed at time of in office evaluation \par \par Assessment/Plan:\par We have discussed pre and postop instructions, recovery limitations, restrictions and expectations, ERAS protocol, NPO status, transportation home, postop medications, COVID-19 policies, benefits and risks of the procedure. Pre-op labs reviewed. The patient would like to proceed with surgery as scheduled.\par \par JERO MARCOS NP\par \par  2021 16:08

## 2022-10-10 ENCOUNTER — NON-APPOINTMENT (OUTPATIENT)
Age: 52
End: 2022-10-10

## 2022-10-11 ENCOUNTER — APPOINTMENT (OUTPATIENT)
Dept: ENDOCRINOLOGY | Facility: CLINIC | Age: 52
End: 2022-10-11

## 2022-10-11 VITALS
DIASTOLIC BLOOD PRESSURE: 70 MMHG | BODY MASS INDEX: 18.17 KG/M2 | HEART RATE: 85 BPM | WEIGHT: 134 LBS | SYSTOLIC BLOOD PRESSURE: 121 MMHG

## 2022-10-11 DIAGNOSIS — Z79.811 LONG TERM (CURRENT) USE OF AROMATASE INHIBITORS: ICD-10-CM

## 2022-10-11 DIAGNOSIS — E04.2 NONTOXIC MULTINODULAR GOITER: ICD-10-CM

## 2022-10-11 DIAGNOSIS — E05.90 THYROTOXICOSIS, UNSPECIFIED W/OUT THYROTOXIC CRISIS OR STORM: ICD-10-CM

## 2022-10-11 PROCEDURE — 76536 US EXAM OF HEAD AND NECK: CPT

## 2022-10-11 PROCEDURE — 36415 COLL VENOUS BLD VENIPUNCTURE: CPT

## 2022-10-11 PROCEDURE — 99205 OFFICE O/P NEW HI 60 MIN: CPT | Mod: 25

## 2022-10-12 ENCOUNTER — OUTPATIENT (OUTPATIENT)
Dept: OUTPATIENT SERVICES | Facility: HOSPITAL | Age: 52
LOS: 1 days | End: 2022-10-12
Payer: COMMERCIAL

## 2022-10-12 ENCOUNTER — APPOINTMENT (OUTPATIENT)
Dept: INFUSION THERAPY | Facility: CLINIC | Age: 52
End: 2022-10-12

## 2022-10-12 VITALS
OXYGEN SATURATION: 98 % | HEART RATE: 73 BPM | RESPIRATION RATE: 17 BRPM | SYSTOLIC BLOOD PRESSURE: 117 MMHG | DIASTOLIC BLOOD PRESSURE: 78 MMHG | TEMPERATURE: 99 F

## 2022-10-12 DIAGNOSIS — Z86.018 PERSONAL HISTORY OF OTHER BENIGN NEOPLASM: Chronic | ICD-10-CM

## 2022-10-12 DIAGNOSIS — C50.912 MALIGNANT NEOPLASM OF UNSPECIFIED SITE OF LEFT FEMALE BREAST: ICD-10-CM

## 2022-10-12 DIAGNOSIS — N88.8 OTHER SPECIFIED NONINFLAMMATORY DISORDERS OF CERVIX UTERI: Chronic | ICD-10-CM

## 2022-10-12 DIAGNOSIS — Z98.82 BREAST IMPLANT STATUS: Chronic | ICD-10-CM

## 2022-10-12 PROCEDURE — 96402 CHEMO HORMON ANTINEOPL SQ/IM: CPT

## 2022-10-12 RX ADMIN — Medication 3.75 MILLIGRAM(S): at 15:20

## 2022-10-13 ENCOUNTER — TRANSCRIPTION ENCOUNTER (OUTPATIENT)
Age: 52
End: 2022-10-13

## 2022-10-13 ENCOUNTER — NON-APPOINTMENT (OUTPATIENT)
Age: 52
End: 2022-10-13

## 2022-10-13 NOTE — ASU PATIENT PROFILE, ADULT - NSALCOHOLPROBLEMSRELYN_GEN_A_CORE_SD
Msg received today from [facility].   Referral to Dr. Oj Coyne, Dermatologist (INTERNAL)    Reason for the order/referral: Dr. Gricelda Arreguin   PCP:   Marcin Francisco   Refer to Provider (first and last name): Dr. Gricelda Arreguin   Specialty: dermatology   Patient I no

## 2022-10-13 NOTE — ASU PATIENT PROFILE, ADULT - NS PRO ABUSE SCREEN SUSPICION NEGLECT YN
Chief Complaint   Patient presents with     Consult     fluid in right ear     Selvin Mata LPN     no

## 2022-10-13 NOTE — ASU PATIENT PROFILE, ADULT - NS PREOP UNDERSTANDS INFO
No solid food, dairy, candy or gum after 11:00pm tonight, water is allowed before 06:00am tomorrow. Come with photo ID/insurance card, dress in comfortable clothes. No jewelries/contact lens/valuables; no smoking/alcohol consumption/recreational drug use tonight. Escort must be 18yrs or older and must show photo ID. Address and call back number provided./yes

## 2022-10-13 NOTE — ASU PATIENT PROFILE, ADULT - NSICDXPASTSURGICALHX_GEN_ALL_CORE_FT
PAST SURGICAL HISTORY:  Cervical mass removal    H/O breast implant bilat    History of benign breast tumor removal left    S/P lymph node biopsy right    S/P right mastectomy

## 2022-10-13 NOTE — ASU PATIENT PROFILE, ADULT - PATIENT'S PREFERRED PRONOUN
How Severe Is Your Skin Lesion?: moderate Has Your Skin Lesion Been Treated?: not been treated Is This A New Presentation, Or A Follow-Up?: Skin Lesion Her/She

## 2022-10-14 ENCOUNTER — TRANSCRIPTION ENCOUNTER (OUTPATIENT)
Age: 52
End: 2022-10-14

## 2022-10-14 ENCOUNTER — OUTPATIENT (OUTPATIENT)
Dept: OUTPATIENT SERVICES | Facility: HOSPITAL | Age: 52
LOS: 1 days | Discharge: HOME | End: 2022-10-14

## 2022-10-14 VITALS
TEMPERATURE: 99 F | SYSTOLIC BLOOD PRESSURE: 122 MMHG | OXYGEN SATURATION: 97 % | DIASTOLIC BLOOD PRESSURE: 65 MMHG | RESPIRATION RATE: 16 BRPM | HEART RATE: 62 BPM

## 2022-10-14 VITALS
RESPIRATION RATE: 16 BRPM | TEMPERATURE: 97 F | WEIGHT: 135.58 LBS | HEIGHT: 72 IN | SYSTOLIC BLOOD PRESSURE: 134 MMHG | HEART RATE: 68 BPM | OXYGEN SATURATION: 99 % | DIASTOLIC BLOOD PRESSURE: 77 MMHG

## 2022-10-14 DIAGNOSIS — Z98.82 BREAST IMPLANT STATUS: Chronic | ICD-10-CM

## 2022-10-14 DIAGNOSIS — N88.8 OTHER SPECIFIED NONINFLAMMATORY DISORDERS OF CERVIX UTERI: Chronic | ICD-10-CM

## 2022-10-14 DIAGNOSIS — Z90.11 ACQUIRED ABSENCE OF RIGHT BREAST AND NIPPLE: Chronic | ICD-10-CM

## 2022-10-14 DIAGNOSIS — Z98.890 OTHER SPECIFIED POSTPROCEDURAL STATES: Chronic | ICD-10-CM

## 2022-10-14 DIAGNOSIS — Z86.018 PERSONAL HISTORY OF OTHER BENIGN NEOPLASM: Chronic | ICD-10-CM

## 2022-10-14 PROCEDURE — 19357 TISS XPNDR PLMT BRST RCNSTJ: CPT | Mod: RT

## 2022-10-14 DEVICE — XPND TISSUE BREAST SMOOTH MOD VAR PROJ 500CC: Type: IMPLANTABLE DEVICE | Site: RIGHT | Status: FUNCTIONAL

## 2022-10-14 RX ORDER — APREPITANT 80 MG/1
40 CAPSULE ORAL ONCE
Refills: 0 | Status: COMPLETED | OUTPATIENT
Start: 2022-10-14 | End: 2022-10-14

## 2022-10-14 RX ORDER — OXYCODONE HYDROCHLORIDE 5 MG/1
5 TABLET ORAL ONCE
Refills: 0 | Status: DISCONTINUED | OUTPATIENT
Start: 2022-10-14 | End: 2022-10-14

## 2022-10-14 RX ORDER — SODIUM CHLORIDE 9 MG/ML
1000 INJECTION, SOLUTION INTRAVENOUS
Refills: 0 | Status: DISCONTINUED | OUTPATIENT
Start: 2022-10-14 | End: 2022-10-14

## 2022-10-14 RX ORDER — ACETAMINOPHEN 500 MG
1000 TABLET ORAL ONCE
Refills: 0 | Status: COMPLETED | OUTPATIENT
Start: 2022-10-14 | End: 2022-10-14

## 2022-10-14 RX ORDER — FENTANYL CITRATE 50 UG/ML
25 INJECTION INTRAVENOUS
Refills: 0 | Status: DISCONTINUED | OUTPATIENT
Start: 2022-10-14 | End: 2022-10-14

## 2022-10-14 RX ORDER — ZOLPIDEM TARTRATE 10 MG/1
1 TABLET ORAL
Qty: 0 | Refills: 0 | DISCHARGE

## 2022-10-14 RX ORDER — ONDANSETRON 8 MG/1
4 TABLET, FILM COATED ORAL ONCE
Refills: 0 | Status: DISCONTINUED | OUTPATIENT
Start: 2022-10-14 | End: 2022-10-14

## 2022-10-14 RX ADMIN — Medication 1000 MILLIGRAM(S): at 08:18

## 2022-10-14 RX ADMIN — FENTANYL CITRATE 25 MICROGRAM(S): 50 INJECTION INTRAVENOUS at 11:41

## 2022-10-14 RX ADMIN — FENTANYL CITRATE 25 MICROGRAM(S): 50 INJECTION INTRAVENOUS at 11:56

## 2022-10-14 RX ADMIN — FENTANYL CITRATE 25 MICROGRAM(S): 50 INJECTION INTRAVENOUS at 12:06

## 2022-10-14 RX ADMIN — APREPITANT 40 MILLIGRAM(S): 80 CAPSULE ORAL at 08:18

## 2022-10-14 RX ADMIN — OXYCODONE HYDROCHLORIDE 5 MILLIGRAM(S): 5 TABLET ORAL at 13:54

## 2022-10-14 RX ADMIN — OXYCODONE HYDROCHLORIDE 5 MILLIGRAM(S): 5 TABLET ORAL at 14:24

## 2022-10-14 NOTE — PHYSICAL EXAM
[Alert] : alert [Well Nourished] : well nourished [No Acute Distress] : no acute distress [Well Developed] : well developed [Normal Sclera/Conjunctiva] : normal sclera/conjunctiva [EOMI] : extra ocular movement intact [No Proptosis] : no proptosis [Normal Oropharynx] : the oropharynx was normal [Thyroid Not Enlarged] : the thyroid was not enlarged [No Thyroid Nodules] : no palpable thyroid nodules [No Respiratory Distress] : no respiratory distress [No Accessory Muscle Use] : no accessory muscle use [Clear to Auscultation] : lungs were clear to auscultation bilaterally [Normal S1, S2] : normal S1 and S2 [Normal Rate] : heart rate was normal [Regular Rhythm] : with a regular rhythm [No Edema] : no peripheral edema [Pedal Pulses Normal] : the pedal pulses are present [Normal Bowel Sounds] : normal bowel sounds [Not Tender] : non-tender [Not Distended] : not distended [Soft] : abdomen soft [Normal Anterior Cervical Nodes] : no anterior cervical lymphadenopathy [Normal Posterior Cervical Nodes] : no posterior cervical lymphadenopathy [No Spinal Tenderness] : no spinal tenderness [No Stigmata of Cushings Syndrome] : no stigmata of Cushings Syndrome [Spine Straight] : spine straight [Normal Gait] : normal gait [Normal Strength/Tone] : muscle strength and tone were normal [No Rash] : no rash [No Tremors] : no tremors [Normal Reflexes] : deep tendon reflexes were 2+ and symmetric [Oriented x3] : oriented to person, place, and time [Acanthosis Nigricans] : no acanthosis nigricans

## 2022-10-14 NOTE — HISTORY OF PRESENT ILLNESS
[FreeTextEntry1] : 53 y/o F w/ Hx of breast Ca on AI, CANDELARIA, pituitary adenoma\par here for initial evaluation and management of thyroid issues\par generally feels well and endorses no acute complaints.\par reportslong standing history of slightly abnormal thyroid levels. most recent test in 8/2022 showed low TSH of 0.02, fT4 and TT3 were wnl. Pt has been at regular state of health. reports recent initiation of Lupron injections + AI for hormonal treatment of breast ca. has not had a formal bone health evaluation. also reports remote hx of pituitary adenoma ~ 2000s, notes some cuts in field of vision and galactorrhea at the time, reports she underwent treatment w/ DA (bromocriptine for 5 years and "adenoma went away"\par She otherwise denies any f/c, CP, SOB, palpitations, tremors, depressed mood, anxiety, palpitations, n/v, stool/urinary abn, skin/weight changes, heat/cold intolerance, HAs, breast/nipple changes, polyuria/polydipsia/nocturia or other complaints.\par she again denies any dysphagia, hoarseness, neck tenderness or new palpable masses. she again denies any family history of thyroid disorders or personal exposure to ionizing radiation.\par \par

## 2022-10-14 NOTE — ASU DISCHARGE PLAN (ADULT/PEDIATRIC) - NS MD DC FALL RISK RISK
For information on Fall & Injury Prevention, visit: [email or web address] OR  [email or web address] OR  https://www.cdc.gov/steadi/patient.html

## 2022-10-14 NOTE — ASU DISCHARGE PLAN (ADULT/PEDIATRIC) - CARE PROVIDER_API CALL
Jose R Barry)  Plastic Surgery  [address] [address]  Phone: [phone]  Fax: [phone]  Follow Up Time: Routine

## 2022-10-14 NOTE — ASSESSMENT
[Methimazole Therapy/PTU Therapy] : Risks and benefits of methimazole therapy/PTU therapy were discussed with the patient, including rash, liver dysfunction, and agranulocytosis. Patient was instructed to call the office for flu-like symptoms (e.g. fever and sore-throat) [FreeTextEntry1] : Now appears euthyroid, although US and TFTs suggest subclinical hyperthyroidism is present. Lupron may also reduce TSH and lead to euthyroid hyperthyroxinemia. WE discussed all options for reestablishing euthyroid state, including TT vs. RIBERA. vs. long term ROTH use, and their respective r/b. confirm diagnosis w/ repeat TFTs, thyroid antibodies and NM uptake exam. Verbalized understanding and agrees with treatment plan, will contact MD and seek emergency medical care if condition changes, we reviewed the sign/symptoms or worsening thyrotoxicosis and thyroid storm. \par \par bone health\par now w. medically induced menopause. no other RF other than potential subclinical hyperthyroidism. refer for DXA study. reviewed proper dietary Ca and Vit D replacement\par \par MNG\par r/b/a to thyroid biopsy, management of thyroid nodules reviewed. repeat US in 12 months. \par \par pituitary adenoma\par has not had formal f/u w/ imaging or labs. check and refer for sellar MRI at this time, as low TSH may be from pituitary adenoma recurrence.

## 2022-10-14 NOTE — PROCEDURE
[FreeTextEntry1] : POC US of the Neck 10/2022, images stored on local hard drive.\par \par Indication: Palpable goiter, abnormal TSH\par \par Comparison: N/A\par \par Findings:\par \par The thyroid parenchyma is heterogenous, mostly isoechoic and exhibits increased vascularity throughout the gland. Left STA velocity is increased ~50 cm/s.\par \par The right thyroid lobe measure 5.2 x 1.4 x 2.7. It contains no discernible nodules.\par \par The isthmus measures 0.43 cm and exhibits no discernible nodules.\par \par The left thyroid lobe measures 5 x 1.5 x 3 cm and exhibits the following nodules:\par \par A left upper pole nodule is identified, measuring 0.8 x 0.6 x 0.8 cm in diameter. It is primarily solid and hypoechoic. It's borders are regular and sharp and it exhibits grade 2 peripheral vascularity and no apparent calcifications.\par \par A left lower pole nodule is identified, measuring 0.8 x 0.5 x 0.6 cm in diameter. It is hypoechoic and solid. It's borders are regular and sharp and it exhibits grade 2 peripheral vascularity and no apparent calcifications.\par \par There are no distinct parathyroids identified on this examination.\par \par No concerning lymphadenopathy observed on bilateral examination.\par

## 2022-10-17 ENCOUNTER — APPOINTMENT (OUTPATIENT)
Dept: PLASTIC SURGERY | Facility: CLINIC | Age: 52
End: 2022-10-17

## 2022-10-17 VITALS — WEIGHT: 134 LBS | HEIGHT: 60 IN | OXYGEN SATURATION: 98 % | HEART RATE: 68 BPM | BODY MASS INDEX: 26.31 KG/M2

## 2022-10-17 PROCEDURE — 99024 POSTOP FOLLOW-UP VISIT: CPT

## 2022-10-17 RX ORDER — DIAZEPAM 5 MG/1
5 TABLET ORAL
Qty: 10 | Refills: 0 | Status: DISCONTINUED | COMMUNITY
Start: 2022-10-06 | End: 2022-10-17

## 2022-10-17 NOTE — SURGICAL HISTORY
[de-identified] : 01/13/22; bilateral tissue expander breast reconstruction [de-identified] : 02/08/22; Mastectomy [de-identified] : 03/10/22; Bilateral T/E Placement [de-identified] : 03/11/22; Right T/E Removal [de-identified] : 10/14/22; delayed right breast tissue expander placement

## 2022-10-17 NOTE — HISTORY OF PRESENT ILLNESS
[FreeTextEntry1] : 51 y/o female presents 3 days s/p delayed right breast tissue expander placement. Denies any f/c/n/v. She has 1 JAKE drain, output high. Patient is taking Percocet for the pain. \par \par PE: Right TE in place. Incisions c/d/i, no collections or s/sx of infection. JAKE drain in place --> serosang fluid, not ready for removal\par Shower ok\par PO instructions reviewed\par RTC in 1 week \par

## 2022-10-19 LAB
25(OH)D3 SERPL-MCNC: 26.7 NG/ML
T3 SERPL-MCNC: 146 NG/DL
T4 FREE SERPL-MCNC: 1.2 NG/DL
THYROGLOB AB SERPL-ACNC: 3559 IU/ML
THYROPEROXIDASE AB SERPL IA-ACNC: 492 IU/ML
TSH RECEPTOR AB: <1.1 IU/L
TSH SERPL-ACNC: 0.04 UIU/ML
TSI ACT/NOR SER: <0.1 IU/L

## 2022-10-27 ENCOUNTER — APPOINTMENT (OUTPATIENT)
Dept: PLASTIC SURGERY | Facility: CLINIC | Age: 52
End: 2022-10-27

## 2022-10-27 VITALS — OXYGEN SATURATION: 97 % | HEIGHT: 60 IN | BODY MASS INDEX: 26.31 KG/M2 | WEIGHT: 134 LBS | HEART RATE: 67 BPM

## 2022-10-27 PROCEDURE — 99024 POSTOP FOLLOW-UP VISIT: CPT

## 2022-10-27 RX ORDER — OXYCODONE AND ACETAMINOPHEN 5; 325 MG/1; MG/1
5-325 TABLET ORAL
Qty: 20 | Refills: 0 | Status: DISCONTINUED | COMMUNITY
Start: 2022-10-06 | End: 2022-10-27

## 2022-10-27 RX ORDER — IBUPROFEN 600 MG/1
600 TABLET, FILM COATED ORAL EVERY 6 HOURS
Qty: 24 | Refills: 0 | Status: DISCONTINUED | COMMUNITY
Start: 2022-10-06 | End: 2022-10-27

## 2022-10-27 NOTE — HISTORY OF PRESENT ILLNESS
[FreeTextEntry1] : 53 y/o female presents 13 days s/p delayed right breast tissue expander placement. Denies any f/c/n/v. She has 1 JAKE drain-> serous fluid. Patient is taking tylenol prn.\par \par PE: Right TE in place. Incisions healing well, no collections or s/sx of infection. JAKE drain in place --> serous fluid, not ready for removal\par Shower ok\par PO instructions reviewed\par RTC tomorrow or Monday for drain removal\par

## 2022-10-27 NOTE — SURGICAL HISTORY
[de-identified] : 01/13/22; bilateral tissue expander breast reconstruction [de-identified] : 02/08/22; Mastectomy [de-identified] : 03/10/22; Bilateral T/E Placement [de-identified] : 03/11/22; Right T/E Removal [de-identified] : 10/14/22; delayed right breast tissue expander placement

## 2022-10-31 ENCOUNTER — APPOINTMENT (OUTPATIENT)
Dept: PLASTIC SURGERY | Facility: CLINIC | Age: 52
End: 2022-10-31

## 2022-10-31 PROCEDURE — 99024 POSTOP FOLLOW-UP VISIT: CPT

## 2022-11-03 NOTE — HISTORY OF PRESENT ILLNESS
[FreeTextEntry1] : 53 y/o s/p delayed right breast tissue expander placement. Denies any f/c/n/v. She has 1 JAKE drain, ready for removal. Patient is taking tylenol prn.\par \par PE: Right TE in place. Incisions healing well, no collections or s/sx of infection. JAKE drain removed\par Shower ok\par PO instructions reviewed\par RTC in 1 week for TE fill\par

## 2022-11-03 NOTE — SURGICAL HISTORY
[de-identified] : 01/13/22; bilateral tissue expander breast reconstruction [de-identified] : 02/08/22; Mastectomy [de-identified] : 03/10/22; Bilateral T/E Placement [de-identified] : 03/11/22; Right T/E Removal [de-identified] : 10/14/22; delayed right breast tissue expander placement

## 2022-11-04 ENCOUNTER — NON-APPOINTMENT (OUTPATIENT)
Age: 52
End: 2022-11-04

## 2022-11-08 ENCOUNTER — APPOINTMENT (OUTPATIENT)
Dept: ORTHOPEDIC SURGERY | Facility: CLINIC | Age: 52
End: 2022-11-08

## 2022-11-08 VITALS — HEIGHT: 60 IN | WEIGHT: 134 LBS | BODY MASS INDEX: 26.31 KG/M2

## 2022-11-08 DIAGNOSIS — M20.21 HALLUX RIGIDUS, RIGHT FOOT: ICD-10-CM

## 2022-11-08 DIAGNOSIS — M21.6X1 OTHER ACQUIRED DEFORMITIES OF RIGHT FOOT: ICD-10-CM

## 2022-11-08 PROCEDURE — 99203 OFFICE O/P NEW LOW 30 MIN: CPT

## 2022-11-08 PROCEDURE — 73630 X-RAY EXAM OF FOOT: CPT | Mod: RT

## 2022-11-09 ENCOUNTER — NON-APPOINTMENT (OUTPATIENT)
Age: 52
End: 2022-11-09

## 2022-11-09 ENCOUNTER — OUTPATIENT (OUTPATIENT)
Dept: OUTPATIENT SERVICES | Facility: HOSPITAL | Age: 52
LOS: 1 days | End: 2022-11-09
Payer: COMMERCIAL

## 2022-11-09 ENCOUNTER — APPOINTMENT (OUTPATIENT)
Dept: OPHTHALMOLOGY | Facility: CLINIC | Age: 52
End: 2022-11-09

## 2022-11-09 ENCOUNTER — APPOINTMENT (OUTPATIENT)
Dept: INFUSION THERAPY | Facility: CLINIC | Age: 52
End: 2022-11-09

## 2022-11-09 VITALS
DIASTOLIC BLOOD PRESSURE: 73 MMHG | HEIGHT: 71 IN | OXYGEN SATURATION: 98 % | SYSTOLIC BLOOD PRESSURE: 114 MMHG | RESPIRATION RATE: 16 BRPM | TEMPERATURE: 97 F | HEART RATE: 68 BPM | WEIGHT: 135.36 LBS

## 2022-11-09 DIAGNOSIS — N88.8 OTHER SPECIFIED NONINFLAMMATORY DISORDERS OF CERVIX UTERI: Chronic | ICD-10-CM

## 2022-11-09 DIAGNOSIS — Z98.82 BREAST IMPLANT STATUS: Chronic | ICD-10-CM

## 2022-11-09 DIAGNOSIS — Z90.11 ACQUIRED ABSENCE OF RIGHT BREAST AND NIPPLE: Chronic | ICD-10-CM

## 2022-11-09 DIAGNOSIS — C50.919 MALIGNANT NEOPLASM OF UNSPECIFIED SITE OF UNSPECIFIED FEMALE BREAST: ICD-10-CM

## 2022-11-09 DIAGNOSIS — Z98.890 OTHER SPECIFIED POSTPROCEDURAL STATES: Chronic | ICD-10-CM

## 2022-11-09 DIAGNOSIS — Z86.018 PERSONAL HISTORY OF OTHER BENIGN NEOPLASM: Chronic | ICD-10-CM

## 2022-11-09 PROCEDURE — 92002 INTRM OPH EXAM NEW PATIENT: CPT

## 2022-11-09 PROCEDURE — 96402 CHEMO HORMON ANTINEOPL SQ/IM: CPT

## 2022-11-09 RX ADMIN — Medication 3.75 MILLIGRAM(S): at 14:55

## 2022-11-12 PROBLEM — M21.6X1 GASTROCNEMIUS EQUINUS OF RIGHT LOWER EXTREMITY: Status: ACTIVE | Noted: 2022-11-12

## 2022-11-12 PROBLEM — M20.21 HALLUX RIGIDUS OF RIGHT FOOT: Status: RESOLVED | Noted: 2022-11-08 | Resolved: 2022-11-12

## 2022-11-12 PROBLEM — M20.21 HALLUX RIGIDUS OF RIGHT FOOT: Status: ACTIVE | Noted: 2022-11-12

## 2022-11-14 ENCOUNTER — APPOINTMENT (OUTPATIENT)
Dept: PLASTIC SURGERY | Facility: CLINIC | Age: 52
End: 2022-11-14

## 2022-11-14 VITALS — WEIGHT: 134 LBS | BODY MASS INDEX: 20.31 KG/M2 | OXYGEN SATURATION: 98 % | HEIGHT: 68 IN | HEART RATE: 83 BPM

## 2022-11-14 PROCEDURE — 99024 POSTOP FOLLOW-UP VISIT: CPT

## 2022-11-14 RX ORDER — IBUPROFEN 600 MG/1
600 TABLET, FILM COATED ORAL 3 TIMES DAILY
Qty: 1 | Refills: 0 | Status: DISCONTINUED | OUTPATIENT
Start: 2022-08-08 | End: 2022-11-14

## 2022-11-15 NOTE — HISTORY OF PRESENT ILLNESS
[FreeTextEntry1] : 51 y/o 1 month s/p delayed right breast tissue expander placement. Denies any f/c/n/v. Patient is not taking any pain medication. Patient is here for T/E fill. Patient states two days ago she had pain on her right side.\par \par PE: Right TE in place. Incision well healed, no collections. +Erythema right breast lower pole, attenuated skin along incision\par No TE fill today\par Bactrim Rx given for infected TE, will plan for removal of implant later this week.\par \par

## 2022-11-15 NOTE — SURGICAL HISTORY
[de-identified] : 01/13/22; bilateral tissue expander breast reconstruction [de-identified] : 02/08/22; Mastectomy [de-identified] : 03/10/22; Bilateral T/E Placement [de-identified] : 03/11/22; Right T/E Removal [de-identified] : 10/14/22; delayed right breast tissue expander placement

## 2022-11-16 ENCOUNTER — TRANSCRIPTION ENCOUNTER (OUTPATIENT)
Age: 52
End: 2022-11-16

## 2022-11-16 ENCOUNTER — APPOINTMENT (OUTPATIENT)
Dept: PLASTIC SURGERY | Facility: CLINIC | Age: 52
End: 2022-11-16

## 2022-11-16 RX ORDER — OXYCODONE HYDROCHLORIDE 5 MG/1
0 TABLET ORAL
Qty: 0 | Refills: 0 | DISCHARGE

## 2022-11-16 NOTE — ASU PATIENT PROFILE, ADULT - FALL HARM RISK - UNIVERSAL INTERVENTIONS
Bed in lowest position, wheels locked, appropriate side rails in place/Call bell, personal items and telephone in reach/Instruct patient to call for assistance before getting out of bed or chair/Non-slip footwear when patient is out of bed/Normal to call system/Physically safe environment - no spills, clutter or unnecessary equipment/Purposeful Proactive Rounding/Room/bathroom lighting operational, light cord in reach

## 2022-11-16 NOTE — ASU PATIENT PROFILE, ADULT - NSICDXPASTSURGICALHX_GEN_ALL_CORE_FT
PAST SURGICAL HISTORY:  Cervical mass removal    H/O bilateral mastectomy     H/O breast implant bilat    S/P lymph node biopsy right

## 2022-11-17 ENCOUNTER — TRANSCRIPTION ENCOUNTER (OUTPATIENT)
Age: 52
End: 2022-11-17

## 2022-11-17 ENCOUNTER — OUTPATIENT (OUTPATIENT)
Dept: OUTPATIENT SERVICES | Facility: HOSPITAL | Age: 52
LOS: 1 days | Discharge: HOME | End: 2022-11-17
Payer: COMMERCIAL

## 2022-11-17 ENCOUNTER — RESULT REVIEW (OUTPATIENT)
Age: 52
End: 2022-11-17

## 2022-11-17 VITALS
TEMPERATURE: 98 F | WEIGHT: 135.8 LBS | SYSTOLIC BLOOD PRESSURE: 135 MMHG | HEART RATE: 75 BPM | OXYGEN SATURATION: 97 % | HEIGHT: 72 IN | DIASTOLIC BLOOD PRESSURE: 81 MMHG | RESPIRATION RATE: 16 BRPM

## 2022-11-17 VITALS
RESPIRATION RATE: 18 BRPM | SYSTOLIC BLOOD PRESSURE: 122 MMHG | HEART RATE: 99 BPM | OXYGEN SATURATION: 99 % | DIASTOLIC BLOOD PRESSURE: 67 MMHG

## 2022-11-17 DIAGNOSIS — N88.8 OTHER SPECIFIED NONINFLAMMATORY DISORDERS OF CERVIX UTERI: Chronic | ICD-10-CM

## 2022-11-17 DIAGNOSIS — Z90.13 ACQUIRED ABSENCE OF BILATERAL BREASTS AND NIPPLES: Chronic | ICD-10-CM

## 2022-11-17 DIAGNOSIS — Z86.018 PERSONAL HISTORY OF OTHER BENIGN NEOPLASM: Chronic | ICD-10-CM

## 2022-11-17 DIAGNOSIS — Z98.82 BREAST IMPLANT STATUS: Chronic | ICD-10-CM

## 2022-11-17 DIAGNOSIS — Z98.890 OTHER SPECIFIED POSTPROCEDURAL STATES: Chronic | ICD-10-CM

## 2022-11-17 LAB
GRAM STN FLD: SIGNIFICANT CHANGE UP
SPECIMEN SOURCE: SIGNIFICANT CHANGE UP

## 2022-11-17 PROCEDURE — 87070 CULTURE OTHR SPECIMN AEROBIC: CPT

## 2022-11-17 PROCEDURE — 88300 SURGICAL PATH GROSS: CPT | Mod: 26,59

## 2022-11-17 PROCEDURE — 87186 SC STD MICRODIL/AGAR DIL: CPT

## 2022-11-17 PROCEDURE — 11971 RMVL TIS XPNDR WO INSJ IMPLT: CPT | Mod: RT,78

## 2022-11-17 PROCEDURE — 88302 TISSUE EXAM BY PATHOLOGIST: CPT | Mod: 26

## 2022-11-17 PROCEDURE — 88300 SURGICAL PATH GROSS: CPT

## 2022-11-17 PROCEDURE — 88302 TISSUE EXAM BY PATHOLOGIST: CPT

## 2022-11-17 PROCEDURE — 11971 RMVL TIS XPNDR WO INSJ IMPLT: CPT | Mod: RT

## 2022-11-17 PROCEDURE — 87075 CULTR BACTERIA EXCEPT BLOOD: CPT

## 2022-11-17 RX ORDER — OXYCODONE HYDROCHLORIDE 5 MG/1
5 TABLET ORAL EVERY 4 HOURS
Refills: 0 | Status: DISCONTINUED | OUTPATIENT
Start: 2022-11-17 | End: 2022-11-17

## 2022-11-17 RX ORDER — HYDROMORPHONE HYDROCHLORIDE 2 MG/ML
0.5 INJECTION INTRAMUSCULAR; INTRAVENOUS; SUBCUTANEOUS ONCE
Refills: 0 | Status: DISCONTINUED | OUTPATIENT
Start: 2022-11-17 | End: 2022-11-17

## 2022-11-17 RX ORDER — ACETAMINOPHEN 500 MG
650 TABLET ORAL EVERY 6 HOURS
Refills: 0 | Status: DISCONTINUED | OUTPATIENT
Start: 2022-11-17 | End: 2022-11-17

## 2022-11-17 RX ORDER — SODIUM CHLORIDE 9 MG/ML
1000 INJECTION, SOLUTION INTRAVENOUS
Refills: 0 | Status: DISCONTINUED | OUTPATIENT
Start: 2022-11-17 | End: 2022-11-17

## 2022-11-17 NOTE — BRIEF OPERATIVE NOTE - OPERATION/FINDINGS
Tissue expander visible through the skin. Elliptical incision made around area of exposure. Tissue expander removed. Area debrided and irrigated. Tissue closed.

## 2022-11-17 NOTE — ASU DISCHARGE PLAN (ADULT/PEDIATRIC) - ASU DC SPECIAL INSTRUCTIONSFT
Medications:  A prescription for pain medication has been sent by Dr. Hough office.   •You may take over the counter pain medication such as Tylenol (acetaminophen) or Advil (ibuprofen) for pain.    Activity:  •No bending or heavy lifting.      Diet:  •	Resume your regular diet  Wound Care:  •You may shower.  Let soap and water run over the incisions and pat dry.  Please continue to wear your bra until you follow up with Dr Barry.  You are being discharged with a drain. Please monitor output until you follow up with Dr. Barry.    Warning Signs:  Please call your doctor if you experience the following:  *You experience new chest pain, pressure, squeezing or tightness.  *New or worsening cough, shortness of breath, or wheeze.  *If you are vomiting and cannot keep down fluids or your medications.  *You are getting dehydrated due to continued vomiting, diarrhea, or other reasons. Signs of dehydration include dry mouth, rapid heartbeat, or feeling dizzy or faint when standing.  *You see blood or dark/black material when you vomit or have a bowel movement.  *You experience burning when you urinate, have blood in your urine, or experience a discharge.  *Your pain is not improving within 8-12 hours or is not gone within 24 hours. Call or return immediately if your pain is getting worse, changes location, or moves to your chest or back.  *You have shaking chills, or fever greater than 101.5 degrees Fahrenheit or 38 degrees Celsius.  *Any change in your symptoms, or any new symptoms that concern you.

## 2022-11-17 NOTE — ASU DISCHARGE PLAN (ADULT/PEDIATRIC) - CARE PROVIDER_API CALL
Jose R Barry)  Plastic Surgery  [address] [address]  Phone: [phone]  Fax: [phone]  Follow Up Time:

## 2022-11-19 LAB
-  CLINDAMYCIN: SIGNIFICANT CHANGE UP
-  ERYTHROMYCIN: SIGNIFICANT CHANGE UP
-  LINEZOLID: SIGNIFICANT CHANGE UP
-  OXACILLIN: SIGNIFICANT CHANGE UP
-  RIFAMPIN: SIGNIFICANT CHANGE UP
-  TRIMETHOPRIM/SULFAMETHOXAZOLE: SIGNIFICANT CHANGE UP
-  VANCOMYCIN: SIGNIFICANT CHANGE UP
METHOD TYPE: SIGNIFICANT CHANGE UP

## 2022-11-21 ENCOUNTER — APPOINTMENT (OUTPATIENT)
Dept: PLASTIC SURGERY | Facility: CLINIC | Age: 52
End: 2022-11-21

## 2022-11-21 DIAGNOSIS — Z92.3 PERSONAL HISTORY OF IRRADIATION: ICD-10-CM

## 2022-11-21 PROCEDURE — 99024 POSTOP FOLLOW-UP VISIT: CPT

## 2022-11-21 NOTE — SURGICAL HISTORY
[de-identified] : 01/13/22: bilateral tissue expander breast reconstruction [de-identified] : 02/08/22: Mastectomy [de-identified] : 03/10/22: Bilateral T/E Placement [de-identified] : 03/11/22: Right T/E Removal [de-identified] : 10/14/22: delayed right breast tissue expander placement  [de-identified] : 11/17/22: Infected tissue expander removal

## 2022-11-21 NOTE — HISTORY OF PRESENT ILLNESS
[FreeTextEntry1] : 53 y/o presents 4 days s/p right infected tissue expender removal. Denies any f/c/n/v. 1 JAKE drain in place --> serosang fluid, ready for removal.\par \par PE: Incisions c/d/i, no collections or s/sx of infection, JAKE drain removed\par PO instructions reviewed\par Shower ok\par Activity restrictions reviewed\par RTC in 2 weeks

## 2022-11-23 LAB
CULTURE RESULTS: SIGNIFICANT CHANGE UP
ORGANISM # SPEC MICROSCOPIC CNT: SIGNIFICANT CHANGE UP
ORGANISM # SPEC MICROSCOPIC CNT: SIGNIFICANT CHANGE UP
SPECIMEN SOURCE: SIGNIFICANT CHANGE UP

## 2022-11-29 LAB — SURGICAL PATHOLOGY STUDY: SIGNIFICANT CHANGE UP

## 2022-12-05 ENCOUNTER — APPOINTMENT (OUTPATIENT)
Dept: PLASTIC SURGERY | Facility: CLINIC | Age: 52
End: 2022-12-05

## 2022-12-05 VITALS — OXYGEN SATURATION: 98 % | WEIGHT: 134 LBS | BODY MASS INDEX: 20.31 KG/M2 | HEIGHT: 68 IN | HEART RATE: 77 BPM

## 2022-12-05 PROCEDURE — 99024 POSTOP FOLLOW-UP VISIT: CPT

## 2022-12-05 RX ORDER — OXYCODONE AND ACETAMINOPHEN 5; 325 MG/1; MG/1
5-325 TABLET ORAL
Qty: 20 | Refills: 0 | Status: DISCONTINUED | COMMUNITY
Start: 2022-11-19 | End: 2022-12-05

## 2022-12-05 RX ORDER — SULFAMETHOXAZOLE AND TRIMETHOPRIM 800; 160 MG/1; MG/1
800-160 TABLET ORAL TWICE DAILY
Qty: 20 | Refills: 0 | Status: DISCONTINUED | COMMUNITY
Start: 2022-11-14 | End: 2022-12-05

## 2022-12-05 RX ORDER — OXYCODONE AND ACETAMINOPHEN 5; 325 MG/1; MG/1
5-325 TABLET ORAL
Qty: 20 | Refills: 0 | Status: DISCONTINUED | COMMUNITY
Start: 2022-11-15 | End: 2022-12-05

## 2022-12-05 NOTE — HISTORY OF PRESENT ILLNESS
[FreeTextEntry1] : 51 y/o presents 18 days s/p right infected tissue expender removal on 11/17/22. Denies any f/c/n/v. Denies need for pain medication. She completed antibiotic Rx.\par \par PE: Incisions healing well, no collections or s/sx of infection, right skin contracted, lower skin thin\par No restrictions\par RTC in 3-4 months to review options for delayed breast reconstruction.

## 2022-12-05 NOTE — SURGICAL HISTORY
[de-identified] : 01/13/22: bilateral tissue expander breast reconstruction [de-identified] : 02/08/22: Mastectomy [de-identified] : 03/10/22: Bilateral T/E Placement [de-identified] : 03/11/22: Right T/E Removal [de-identified] : 10/14/22: delayed right breast tissue expander placement  [de-identified] : 11/17/22: Infected tissue expander removal

## 2022-12-07 ENCOUNTER — APPOINTMENT (OUTPATIENT)
Dept: GASTROENTEROLOGY | Facility: CLINIC | Age: 52
End: 2022-12-07

## 2022-12-07 ENCOUNTER — OUTPATIENT (OUTPATIENT)
Dept: OUTPATIENT SERVICES | Facility: HOSPITAL | Age: 52
LOS: 1 days | End: 2022-12-07
Payer: COMMERCIAL

## 2022-12-07 ENCOUNTER — APPOINTMENT (OUTPATIENT)
Dept: INFUSION THERAPY | Facility: CLINIC | Age: 52
End: 2022-12-07

## 2022-12-07 VITALS
HEART RATE: 86 BPM | OXYGEN SATURATION: 97 % | TEMPERATURE: 98 F | RESPIRATION RATE: 18 BRPM | SYSTOLIC BLOOD PRESSURE: 131 MMHG | DIASTOLIC BLOOD PRESSURE: 68 MMHG

## 2022-12-07 DIAGNOSIS — Z12.11 ENCOUNTER FOR SCREENING FOR MALIGNANT NEOPLASM OF COLON: ICD-10-CM

## 2022-12-07 DIAGNOSIS — N88.8 OTHER SPECIFIED NONINFLAMMATORY DISORDERS OF CERVIX UTERI: Chronic | ICD-10-CM

## 2022-12-07 DIAGNOSIS — Z90.13 ACQUIRED ABSENCE OF BILATERAL BREASTS AND NIPPLES: Chronic | ICD-10-CM

## 2022-12-07 DIAGNOSIS — C50.919 MALIGNANT NEOPLASM OF UNSPECIFIED SITE OF UNSPECIFIED FEMALE BREAST: ICD-10-CM

## 2022-12-07 DIAGNOSIS — Z98.890 OTHER SPECIFIED POSTPROCEDURAL STATES: Chronic | ICD-10-CM

## 2022-12-07 DIAGNOSIS — Z98.82 BREAST IMPLANT STATUS: Chronic | ICD-10-CM

## 2022-12-07 PROCEDURE — 99203 OFFICE O/P NEW LOW 30 MIN: CPT | Mod: 95

## 2022-12-07 RX ORDER — LEUPROLIDE ACETATE 3.75 MG
3.75 KIT INTRAMUSCULAR
Refills: 0 | Status: ACTIVE | COMMUNITY

## 2022-12-07 RX ORDER — ALBUTEROL SULFATE 90 UG/1
108 (90 BASE) INHALANT RESPIRATORY (INHALATION)
Qty: 8 | Refills: 0 | Status: DISCONTINUED | COMMUNITY
Start: 2022-09-01 | End: 2022-12-07

## 2022-12-07 RX ADMIN — Medication 3.75 MILLIGRAM(S): at 15:25

## 2023-01-04 ENCOUNTER — NON-APPOINTMENT (OUTPATIENT)
Age: 53
End: 2023-01-04

## 2023-01-06 ENCOUNTER — OUTPATIENT (OUTPATIENT)
Dept: OUTPATIENT SERVICES | Facility: HOSPITAL | Age: 53
LOS: 1 days | End: 2023-01-06
Payer: COMMERCIAL

## 2023-01-06 ENCOUNTER — APPOINTMENT (OUTPATIENT)
Dept: HEMATOLOGY ONCOLOGY | Facility: CLINIC | Age: 53
End: 2023-01-06

## 2023-01-06 ENCOUNTER — APPOINTMENT (OUTPATIENT)
Dept: INFUSION THERAPY | Facility: CLINIC | Age: 53
End: 2023-01-06

## 2023-01-06 VITALS
DIASTOLIC BLOOD PRESSURE: 84 MMHG | HEART RATE: 71 BPM | OXYGEN SATURATION: 99 % | TEMPERATURE: 99 F | SYSTOLIC BLOOD PRESSURE: 125 MMHG | RESPIRATION RATE: 18 BRPM

## 2023-01-06 DIAGNOSIS — N88.8 OTHER SPECIFIED NONINFLAMMATORY DISORDERS OF CERVIX UTERI: Chronic | ICD-10-CM

## 2023-01-06 DIAGNOSIS — C50.919 MALIGNANT NEOPLASM OF UNSPECIFIED SITE OF UNSPECIFIED FEMALE BREAST: ICD-10-CM

## 2023-01-06 DIAGNOSIS — Z90.13 ACQUIRED ABSENCE OF BILATERAL BREASTS AND NIPPLES: Chronic | ICD-10-CM

## 2023-01-06 DIAGNOSIS — Z98.890 OTHER SPECIFIED POSTPROCEDURAL STATES: Chronic | ICD-10-CM

## 2023-01-06 DIAGNOSIS — Z98.82 BREAST IMPLANT STATUS: Chronic | ICD-10-CM

## 2023-01-06 LAB
ALBUMIN SERPL ELPH-MCNC: 4.7 G/DL — SIGNIFICANT CHANGE UP (ref 3.3–5)
ALP SERPL-CCNC: 87 U/L — SIGNIFICANT CHANGE UP (ref 40–120)
ALT FLD-CCNC: 19 U/L — SIGNIFICANT CHANGE UP (ref 10–45)
ANION GAP SERPL CALC-SCNC: 9 MMOL/L — SIGNIFICANT CHANGE UP (ref 5–17)
AST SERPL-CCNC: 23 U/L — SIGNIFICANT CHANGE UP (ref 10–40)
BASOPHILS # BLD AUTO: 0.03 K/UL — SIGNIFICANT CHANGE UP (ref 0–0.2)
BASOPHILS NFR BLD AUTO: 0.8 % — SIGNIFICANT CHANGE UP (ref 0–2)
BILIRUB SERPL-MCNC: 0.3 MG/DL — SIGNIFICANT CHANGE UP (ref 0.2–1.2)
BUN SERPL-MCNC: 8 MG/DL — SIGNIFICANT CHANGE UP (ref 7–23)
CALCIUM SERPL-MCNC: 9.4 MG/DL — SIGNIFICANT CHANGE UP (ref 8.4–10.5)
CHLORIDE SERPL-SCNC: 102 MMOL/L — SIGNIFICANT CHANGE UP (ref 96–108)
CO2 SERPL-SCNC: 28 MMOL/L — SIGNIFICANT CHANGE UP (ref 22–31)
CREAT SERPL-MCNC: 0.52 MG/DL — SIGNIFICANT CHANGE UP (ref 0.5–1.3)
CRP SERPL-MCNC: <3 MG/L — SIGNIFICANT CHANGE UP (ref 0–4)
EGFR: 111 ML/MIN/1.73M2 — SIGNIFICANT CHANGE UP
EOSINOPHIL # BLD AUTO: 0.12 K/UL — SIGNIFICANT CHANGE UP (ref 0–0.5)
EOSINOPHIL NFR BLD AUTO: 3.3 % — SIGNIFICANT CHANGE UP (ref 0–6)
ERYTHROCYTE [SEDIMENTATION RATE] IN BLOOD: 15 MM/HR — SIGNIFICANT CHANGE UP
FERRITIN SERPL-MCNC: 43 NG/ML — SIGNIFICANT CHANGE UP (ref 15–150)
GLUCOSE SERPL-MCNC: 98 MG/DL — SIGNIFICANT CHANGE UP (ref 70–99)
HCT VFR BLD CALC: 38.8 % — SIGNIFICANT CHANGE UP (ref 34.5–45)
HGB BLD-MCNC: 12.7 G/DL — SIGNIFICANT CHANGE UP (ref 11.5–15.5)
IMM GRANULOCYTES NFR BLD AUTO: 0.3 % — SIGNIFICANT CHANGE UP (ref 0–0.9)
IRON SATN MFR SERPL: 104 UG/DL — SIGNIFICANT CHANGE UP (ref 30–160)
IRON SATN MFR SERPL: 32 % — SIGNIFICANT CHANGE UP (ref 14–50)
LDH SERPL L TO P-CCNC: 141 U/L — SIGNIFICANT CHANGE UP (ref 50–242)
LYMPHOCYTES # BLD AUTO: 1.07 K/UL — SIGNIFICANT CHANGE UP (ref 1–3.3)
LYMPHOCYTES # BLD AUTO: 29 % — SIGNIFICANT CHANGE UP (ref 13–44)
MAGNESIUM SERPL-MCNC: 1.8 MG/DL — SIGNIFICANT CHANGE UP (ref 1.6–2.6)
MCHC RBC-ENTMCNC: 30.3 PG — SIGNIFICANT CHANGE UP (ref 27–34)
MCHC RBC-ENTMCNC: 32.7 GM/DL — SIGNIFICANT CHANGE UP (ref 32–36)
MCV RBC AUTO: 92.6 FL — SIGNIFICANT CHANGE UP (ref 80–100)
MONOCYTES # BLD AUTO: 0.32 K/UL — SIGNIFICANT CHANGE UP (ref 0–0.9)
MONOCYTES NFR BLD AUTO: 8.7 % — SIGNIFICANT CHANGE UP (ref 2–14)
NEUTROPHILS # BLD AUTO: 2.14 K/UL — SIGNIFICANT CHANGE UP (ref 1.8–7.4)
NEUTROPHILS NFR BLD AUTO: 57.9 % — SIGNIFICANT CHANGE UP (ref 43–77)
NRBC # BLD: 0 /100 WBCS — SIGNIFICANT CHANGE UP (ref 0–0)
PHOSPHATE SERPL-MCNC: 4 MG/DL — SIGNIFICANT CHANGE UP (ref 2.5–4.5)
PLATELET # BLD AUTO: 229 K/UL — SIGNIFICANT CHANGE UP (ref 150–400)
POTASSIUM SERPL-MCNC: 3.9 MMOL/L — SIGNIFICANT CHANGE UP (ref 3.5–5.3)
POTASSIUM SERPL-SCNC: 3.9 MMOL/L — SIGNIFICANT CHANGE UP (ref 3.5–5.3)
PROT SERPL-MCNC: 7.8 G/DL — SIGNIFICANT CHANGE UP (ref 6–8.3)
RBC # BLD: 4.19 M/UL — SIGNIFICANT CHANGE UP (ref 3.8–5.2)
RBC # FLD: 13.6 % — SIGNIFICANT CHANGE UP (ref 10.3–14.5)
SODIUM SERPL-SCNC: 139 MMOL/L — SIGNIFICANT CHANGE UP (ref 135–145)
TIBC SERPL-MCNC: 326 UG/DL — SIGNIFICANT CHANGE UP (ref 220–430)
UIBC SERPL-MCNC: 222 UG/DL — SIGNIFICANT CHANGE UP (ref 110–370)
URATE SERPL-MCNC: 4.6 MG/DL — SIGNIFICANT CHANGE UP (ref 2.5–7)
WBC # BLD: 3.69 K/UL — LOW (ref 3.8–10.5)
WBC # FLD AUTO: 3.69 K/UL — LOW (ref 3.8–10.5)

## 2023-01-06 PROCEDURE — 84550 ASSAY OF BLOOD/URIC ACID: CPT

## 2023-01-06 PROCEDURE — 83550 IRON BINDING TEST: CPT

## 2023-01-06 PROCEDURE — 85652 RBC SED RATE AUTOMATED: CPT

## 2023-01-06 PROCEDURE — 85025 COMPLETE CBC W/AUTO DIFF WBC: CPT

## 2023-01-06 PROCEDURE — 82378 CARCINOEMBRYONIC ANTIGEN: CPT

## 2023-01-06 PROCEDURE — 82306 VITAMIN D 25 HYDROXY: CPT

## 2023-01-06 PROCEDURE — 82728 ASSAY OF FERRITIN: CPT

## 2023-01-06 PROCEDURE — 86140 C-REACTIVE PROTEIN: CPT

## 2023-01-06 PROCEDURE — 36415 COLL VENOUS BLD VENIPUNCTURE: CPT

## 2023-01-06 PROCEDURE — 83735 ASSAY OF MAGNESIUM: CPT

## 2023-01-06 PROCEDURE — 80053 COMPREHEN METABOLIC PANEL: CPT

## 2023-01-06 PROCEDURE — 83540 ASSAY OF IRON: CPT

## 2023-01-06 PROCEDURE — 83615 LACTATE (LD) (LDH) ENZYME: CPT

## 2023-01-06 PROCEDURE — 84100 ASSAY OF PHOSPHORUS: CPT

## 2023-01-06 PROCEDURE — 86300 IMMUNOASSAY TUMOR CA 15-3: CPT

## 2023-01-06 RX ADMIN — Medication 3.75 MILLIGRAM(S): at 15:40

## 2023-01-07 LAB
24R-OH-CALCIDIOL SERPL-MCNC: 27.3 NG/ML — LOW (ref 30–80)
CANCER AG27-29 SERPL-ACNC: 21.9 U/ML — SIGNIFICANT CHANGE UP (ref 0–38.6)
CEA SERPL-MCNC: 1.4 NG/ML — SIGNIFICANT CHANGE UP (ref 0–3.8)

## 2023-01-19 ENCOUNTER — NON-APPOINTMENT (OUTPATIENT)
Age: 53
End: 2023-01-19

## 2023-01-24 ENCOUNTER — NON-APPOINTMENT (OUTPATIENT)
Age: 53
End: 2023-01-24

## 2023-01-25 ENCOUNTER — APPOINTMENT (OUTPATIENT)
Dept: OPHTHALMOLOGY | Facility: CLINIC | Age: 53
End: 2023-01-25

## 2023-01-25 ENCOUNTER — NON-APPOINTMENT (OUTPATIENT)
Age: 53
End: 2023-01-25

## 2023-01-26 ENCOUNTER — APPOINTMENT (OUTPATIENT)
Dept: HEMATOLOGY ONCOLOGY | Facility: CLINIC | Age: 53
End: 2023-01-26
Payer: MEDICAID

## 2023-01-26 VITALS
BODY MASS INDEX: 21.41 KG/M2 | RESPIRATION RATE: 16 BRPM | HEART RATE: 74 BPM | HEIGHT: 68 IN | DIASTOLIC BLOOD PRESSURE: 85 MMHG | OXYGEN SATURATION: 99 % | SYSTOLIC BLOOD PRESSURE: 134 MMHG | TEMPERATURE: 97.7 F | WEIGHT: 141.3 LBS

## 2023-01-26 DIAGNOSIS — T85.9XXA UNSPECIFIED COMPLICATION OF INTERNAL PROSTHETIC DEVICE, IMPLANT AND GRAFT, INITIAL ENCOUNTER: ICD-10-CM

## 2023-01-26 PROCEDURE — 99214 OFFICE O/P EST MOD 30 MIN: CPT | Mod: 25

## 2023-01-27 NOTE — ASSESSMENT
[FreeTextEntry1] : 53 yo premenopausal Polish woman ( fluent in English and Polish) referred by Dr. Elise for evaluation of left breast cancer.   Biopsy revealed IDC ESTROGEN RECEPTOR: POSITIVE 45% NUCLEAR STAINING WITH WEAK/MODERATE 1+/2+ INTENSITY PROGESTERONE RECEPTOR: POSITIVE 30% NUCLEAR STAINING WITH MODERATE 2+ INTENSITY HER-2: NEGATIVE (0 MEMBRANOUS STAINING) KI 67 35 %.\par \par Imaging studies with left breast mass in US 3.9 x 1.1 x 1.2 cm, MRI max dimension 5.3 cm.\par Right breast lesion - biopsy unsuccessful.\par \par Patient decided on bilateral mastectomy.  She understand indication for systemic chemotherapy based on lymph node status and molecular testing. 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 Reviewed protocols of chemotherapy including ACdd> Tweekly, TC and CMF with the last one with lowest incidence of hair loss.\par Mammaprint results c/w low recurrence score, reviewed with patient if LN positive given the fact that she is premenopausal she might have indication for adjuvant chemotherapy. If LN negative plan for AI. \par Zoladex 3.6 mg today, reviewed side effects. \par \par Patient underwent left sided mastectomy - pathology report reviewed, IDC 4 cm, T2, N1, with LVI.\par \par Patient underwent right sided mastectomy and b/l placement of the expanders, her postop course was complicated by bleeding from right side.\par \par She feels depressed and anxious - had 4 surgeries in the last 6 weeks, 1st one with anaphylactic reaction to MB, left breast mastectomy, followed by right breast mastectomy, complicated by bleeding.  S\par Feels tired and was unable to sleep at night well.\par \par Continue Zoladex since 3/23/2022\par Right breast mastectomy - pathology reviewed 3/23/2022- papilloma and ADH\par \par Anemia - blood loss-, start PO iron , ferrous gluconate PO daily in am. Hg 10.5. \par \par RX lexapro- start with 5 mg, will increase to 10 mg.\par She has limited social support, lives by herself, father and mother ( dementia)  in [place],  referred to , would benefit from psychosocial support.  She is very hesitant about chemotherapy since loosing her her will affect her livelihood. \par Oncotype Dx 13, distant risk of recurrence 13%, with no benefit from chemotherapy. \par \par Patient presented in multidisciplinary TB, offered OS to continue with anastrozole.  RX send today. Patient to follow up with rad onc to start RT.  She was educated to hold anastrozole during RT.\par Blood drawn in office. Ordered and reviewed.\par \par Continue ovarian suppression Zoladex 3/2022 > change to Lupron 3.75 mg q 28 days as per insurance\par Start Anastrozole 1 mg  PO daily- hold during radiation. \par S/p radiation July 13, 2022\par \par Tx - Lupron 3.75 mg Q 4 weeks, next injection 2/3/23 at [facility]\par Change to Q 3 m 11.25 mg  on 3/3/2023, dexa same day \par Dexa ordered\par Labs next visit - CBC, CMP, ferritin \par \par \par
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
## 2023-02-01 ENCOUNTER — OUTPATIENT (OUTPATIENT)
Dept: OUTPATIENT SERVICES | Facility: HOSPITAL | Age: 53
LOS: 1 days | End: 2023-02-01
Payer: COMMERCIAL

## 2023-02-01 ENCOUNTER — APPOINTMENT (OUTPATIENT)
Dept: INFUSION THERAPY | Facility: CLINIC | Age: 53
End: 2023-02-01

## 2023-02-01 VITALS
SYSTOLIC BLOOD PRESSURE: 130 MMHG | DIASTOLIC BLOOD PRESSURE: 85 MMHG | TEMPERATURE: 98 F | WEIGHT: 138.01 LBS | OXYGEN SATURATION: 98 % | RESPIRATION RATE: 18 BRPM | HEIGHT: 71 IN | HEART RATE: 64 BPM

## 2023-02-01 DIAGNOSIS — Z98.82 BREAST IMPLANT STATUS: Chronic | ICD-10-CM

## 2023-02-01 DIAGNOSIS — Z98.890 OTHER SPECIFIED POSTPROCEDURAL STATES: Chronic | ICD-10-CM

## 2023-02-01 DIAGNOSIS — C50.919 MALIGNANT NEOPLASM OF UNSPECIFIED SITE OF UNSPECIFIED FEMALE BREAST: ICD-10-CM

## 2023-02-01 DIAGNOSIS — N88.8 OTHER SPECIFIED NONINFLAMMATORY DISORDERS OF CERVIX UTERI: Chronic | ICD-10-CM

## 2023-02-01 DIAGNOSIS — Z90.13 ACQUIRED ABSENCE OF BILATERAL BREASTS AND NIPPLES: Chronic | ICD-10-CM

## 2023-02-01 RX ADMIN — Medication 3.75 MILLIGRAM(S): at 15:05

## 2023-02-02 ENCOUNTER — RX RENEWAL (OUTPATIENT)
Age: 53
End: 2023-02-02

## 2023-02-07 ENCOUNTER — NON-APPOINTMENT (OUTPATIENT)
Age: 53
End: 2023-02-07

## 2023-02-08 NOTE — REASON FOR VISIT
[Initial Evaluation] : an initial evaluation [Home] : at home, [unfilled] , at the time of the visit. [Medical Office: ([facility])___] : at the medical office located in  [Patient] : the patient [FreeTextEntry1] : L breast cancer, Colon cancer screening

## 2023-02-08 NOTE — ASSESSMENT
[FreeTextEntry1] : 51 y/o F with hx of L  invasive mammary carcinoma 11/2021 s/p left and right breast mastectomy on Anastrazole + Lupron, pituitary adenoma, multinodular goiter and PTSD presents for colon cancer screening.\par \par Colon cancer screening\par -Schedule colonoscopy at [facility] or [facility] as per Dr. Vazquez request\par -Obtain pre-op labs (CBC + COMP)\par -Bowel prep provided, miralax\par r/b/a/i discussed and patient agreeable\par -Details of procedure, including risks of procedure which include but not limited to bleeding, perforation, infection, missed polyp discussed and patient gives verbal consent. Written consent to be obtained at time of procedure.\par -Pt was advised that an escort is needed to  from procedure\par -Will f/u post procedure\par \par
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
## 2023-02-09 ENCOUNTER — NON-APPOINTMENT (OUTPATIENT)
Age: 53
End: 2023-02-09

## 2023-02-09 ENCOUNTER — APPOINTMENT (OUTPATIENT)
Age: 53
End: 2023-02-09
Payer: MEDICAID

## 2023-02-09 PROCEDURE — 45378 DIAGNOSTIC COLONOSCOPY: CPT | Mod: 33

## 2023-02-21 PROCEDURE — 96372 THER/PROPH/DIAG INJ SC/IM: CPT

## 2023-03-01 ENCOUNTER — APPOINTMENT (OUTPATIENT)
Dept: INFUSION THERAPY | Facility: CLINIC | Age: 53
End: 2023-03-01

## 2023-03-02 ENCOUNTER — RESULT REVIEW (OUTPATIENT)
Age: 53
End: 2023-03-02

## 2023-03-02 ENCOUNTER — APPOINTMENT (OUTPATIENT)
Dept: HEMATOLOGY ONCOLOGY | Facility: CLINIC | Age: 53
End: 2023-03-02
Payer: MEDICAID

## 2023-03-02 VITALS
HEIGHT: 68 IN | OXYGEN SATURATION: 99 % | HEART RATE: 69 BPM | DIASTOLIC BLOOD PRESSURE: 77 MMHG | RESPIRATION RATE: 16 BRPM | SYSTOLIC BLOOD PRESSURE: 127 MMHG | BODY MASS INDEX: 20.92 KG/M2 | WEIGHT: 138 LBS | TEMPERATURE: 97.7 F

## 2023-03-02 DIAGNOSIS — C50.919 MALIGNANT NEOPLASM OF UNSPECIFIED SITE OF UNSPECIFIED FEMALE BREAST: ICD-10-CM

## 2023-03-02 PROCEDURE — 99214 OFFICE O/P EST MOD 30 MIN: CPT | Mod: 25

## 2023-03-03 DIAGNOSIS — N39.0 URINARY TRACT INFECTION, SITE NOT SPECIFIED: ICD-10-CM
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
## 2023-03-05 NOTE — PHYSICAL EXAM
[Fully active, able to carry on all pre-disease performance without restriction] : Status 0 - Fully active, able to carry on all pre-disease performance without restriction [Normal] : affect appropriate [de-identified] : b/l mastectomy - left breast expanded, right no expander, healed

## 2023-03-05 NOTE — ASSESSMENT
[FreeTextEntry1] : 51 yo premenopausal Polish woman ( fluent in English and Polish) referred by Dr. Elise for evaluation of left breast cancer.   Biopsy revealed IDC ESTROGEN RECEPTOR: POSITIVE 45% NUCLEAR STAINING WITH WEAK/MODERATE 1+/2+ INTENSITY PROGESTERONE RECEPTOR: POSITIVE 30% NUCLEAR STAINING WITH MODERATE 2+ INTENSITY HER-2: NEGATIVE (0 MEMBRANOUS STAINING) KI 67 35 %.\par \par Imaging studies with left breast mass in US 3.9 x 1.1 x 1.2 cm, MRI max dimension 5.3 cm.\par Right breast lesion - biopsy unsuccessful.\par \par Patient decided on bilateral mastectomy.  She understand indication for systemic chemotherapy based on lymph node status and molecular testing. 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 Reviewed protocols of chemotherapy including ACdd> Tweekly, TC and CMF with the last one with lowest incidence of hair loss.\par Mammaprint results c/w low recurrence score, reviewed with patient if LN positive given the fact that she is premenopausal she might have indication for adjuvant chemotherapy. If LN negative plan for AI. \par Zoladex 3.6 mg today, reviewed side effects. \par \par Patient underwent left sided mastectomy - pathology report reviewed, IDC 4 cm, T2, N1, with LVI.\par \par Patient underwent right sided mastectomy and b/l placement of the expanders, her postop course was complicated by bleeding from right side.\par \par She feels depressed and anxious - had 4 surgeries in the last 6 weeks, 1st one with anaphylactic reaction to MB, left breast mastectomy, followed by right breast mastectomy, complicated by bleeding.  \par \par Continue Zoladex since 3/23/2022\par Right breast mastectomy - pathology reviewed 3/23/2022- papilloma and ADH\par \par Anemia - blood loss-, start PO iron , ferrous gluconate PO daily in am. Hg 10.5. \par \par # Depressed mood-Lexapro- 10 mg.\par She has limited social support, lives by herself, father and mother ( dementia)  in [place],  referred to , would benefit from psychosocial support.  She is very hesitant about chemotherapy since loosing her her will affect her livelihood. \par Oncotype Dx 13, distant risk of recurrence 13%, with no benefit from chemotherapy. \par \par S/p RT June 2022 \par \par Continue ovarian suppression Zoladex 3/2022 >  Lupron 3.75 mg q 28 days> Lupron 11.25 mg 3/2/2023\par Start Anastrozole 1 mg  PO daily- hold during radiation. \par S/p radiation July 13, 2022\par \par Tx - Lupron 3.75 mg Q 4 weeks, next injection 2/3/23 at [facility]\par Change to Q 3 m 11.25 mg  on 3/3/2023, dexa same day \par \par # Needs bunion sx - to d/w Dr. Barry which should take place first.\par Dexa ordered\par Labs next visit - CBC, CMP, ferritin \par \par \par

## 2023-03-06 ENCOUNTER — APPOINTMENT (OUTPATIENT)
Dept: PLASTIC SURGERY | Facility: CLINIC | Age: 53
End: 2023-03-06
Payer: MEDICAID

## 2023-03-06 VITALS — BODY MASS INDEX: 21.01 KG/M2 | HEIGHT: 68 IN | WEIGHT: 138.6 LBS | HEART RATE: 89 BPM | OXYGEN SATURATION: 98 %

## 2023-03-06 PROCEDURE — 99212 OFFICE O/P EST SF 10 MIN: CPT

## 2023-03-06 NOTE — HISTORY OF PRESENT ILLNESS
[FreeTextEntry1] : 53 y/o presents 4 months s/p right infected tissue expender removal on 11/17/22. Denies any f/c/n/v. Denies need for pain medication. Patient is here to discuss next stage of surgery. She reports she needs foot surgery in the near future.\par \par PE: Incisions well healed, no collections or s/sx of infection, right skin now soft and pliable and ready for additional surgery.\par \par Today we discussed replacement of right breast tissue expander. The nature of surgery, its risks, benefits, alternatives, expected postoperative course, recovery and long term results were discussed in detail. All questions were answered. She will have foot surgery first and then schedule delayed right breast tissue expander reconstruction. \par

## 2023-03-06 NOTE — SURGICAL HISTORY
[de-identified] : 01/13/22: bilateral tissue expander breast reconstruction [de-identified] : 02/08/22: Mastectomy [de-identified] : 03/10/22: Bilateral T/E Placement [de-identified] : 03/11/22: Right T/E Removal [de-identified] : 10/14/22: delayed right breast tissue expander placement  [de-identified] : 11/17/22: Infected tissue expander removal

## 2023-03-08 ENCOUNTER — RESULT REVIEW (OUTPATIENT)
Age: 53
End: 2023-03-08

## 2023-03-14 ENCOUNTER — APPOINTMENT (OUTPATIENT)
Dept: BREAST CENTER | Facility: CLINIC | Age: 53
End: 2023-03-14
Payer: MEDICAID

## 2023-03-14 VITALS
SYSTOLIC BLOOD PRESSURE: 126 MMHG | HEART RATE: 109 BPM | DIASTOLIC BLOOD PRESSURE: 80 MMHG | HEIGHT: 71.5 IN | BODY MASS INDEX: 19.03 KG/M2 | OXYGEN SATURATION: 97 % | WEIGHT: 139 LBS

## 2023-03-14 DIAGNOSIS — Z78.9 OTHER SPECIFIED HEALTH STATUS: ICD-10-CM

## 2023-03-14 DIAGNOSIS — Z85.3 PERSONAL HISTORY OF MALIGNANT NEOPLASM OF BREAST: ICD-10-CM

## 2023-03-14 PROCEDURE — 99214 OFFICE O/P EST MOD 30 MIN: CPT

## 2023-03-14 RX ORDER — HYDROCORTISONE 25 MG/G
2.5 CREAM TOPICAL
Refills: 0 | Status: COMPLETED | COMMUNITY
Start: 2021-09-01 | End: 2023-03-14

## 2023-03-14 RX ORDER — CEFDINIR 300 MG/1
300 CAPSULE ORAL
Refills: 0 | Status: COMPLETED | COMMUNITY
Start: 2022-09-01 | End: 2023-03-14

## 2023-03-14 RX ORDER — PROMETHAZINE HYDROCHLORIDE AND DEXTROMETHORPHAN HYDROBROMIDE ORAL SOLUTION 15; 6.25 MG/5ML; MG/5ML
6.25-15 SOLUTION ORAL
Refills: 0 | Status: COMPLETED | COMMUNITY
Start: 2022-09-01 | End: 2023-03-14

## 2023-03-14 RX ORDER — FLUOROURACIL 50 MG/G
5 CREAM TOPICAL
Refills: 0 | Status: COMPLETED | COMMUNITY
Start: 2021-08-04 | End: 2023-03-14

## 2023-03-14 RX ORDER — EMOLLIENT COMBINATION NO.10
EMULSION (GRAM) TOPICAL
Refills: 0 | Status: COMPLETED | COMMUNITY
Start: 2022-07-26 | End: 2023-03-14

## 2023-03-14 RX ORDER — CARBAMIDE PEROXIDE 6.5% 6.5 G/100ML
6.5 LIQUID AURICULAR (OTIC)
Refills: 0 | Status: COMPLETED | COMMUNITY
Start: 2021-06-28 | End: 2023-03-14

## 2023-03-14 NOTE — PAST MEDICAL HISTORY
[Menarche Age ____] : age at menarche was [unfilled] [Definite ___ (Date)] : the last menstrual period was [unfilled] [Total Preg ___] : G[unfilled] [Living ___] : Living: [unfilled] [AB Spont ___] : miscarriages: [unfilled]  [Menopause Age____] : age at menopause was [unfilled] [History of Hormone Replacement Treatment] : has no history of hormone replacement treatment [FreeTextEntry6] : No [FreeTextEntry5] : No [FreeTextEntry8] : n/a [FreeTextEntry7] : Yes

## 2023-03-14 NOTE — HISTORY OF PRESENT ILLNESS
[FreeTextEntry1] : 51 yo female presents for breast cancer surveillance. Patient has a history of left invasive mammary carcinoma, 4.0cm, with focal mammary carcinoma in situ (ER+ UT+ HER2 negative) s/p left mastectomy, left SLNB on 2/8/22 without reconstruction, final surgical pathology from 2/8/22 revealed IDC with 1/2 LN positive. Oncoptye RS 13. Patient s/p right mastectomy, right SLNB and bilateral TE placement (by Dr. Barry) on 3/10/22, final surgical pathology of right breast showed IDP and LCIS and 0/2+ SLNs.; immediate post-op course was complicated by right breast hematoma s/p evacuation and washout and removal of TE 3/11/22. S/p delayed right breast TE placement 10/14/22 complicated by infection which was subsequently removed 11/17/22. Of note, the patient was originally planned for b/l mastectomy w/ TE reconstruction; however, surgical case in 2/2022 was complicated by an anaphylactic reaction. The patient met with allergist and it was determined that isosulfan blue was the cause of her anaphylactic reaction. Patient was receiving Zoladex;however due to insurance her course was changed to Lupron injections and is taking Anastrozole managed by Dr. Marlow. She discontinued Anastrozole on 6/3/2022 in preparation for radiation treatment. She underwent her CT simulation, was prepared to start XRT on 5/11/22, but was delayed due to insurance denial reasons. She has completed XRT of left chest wall and lymph nodes on 7/13/22 with Dr. Romero. Patient has resumed Anastrazole.\par \par Patient is pending right foot surgery and right breast tissue expander reconstruction with Dr. Barry. Plans to proceed with foot surgery first. \par \par [id number]\par \par The patient is planning on having her right TE placed followed by her foot surgery. She was instructed on the importance of self breast exams as there is no indication for breast imaging at this time. \par

## 2023-03-14 NOTE — DATA REVIEWED
[FreeTextEntry1] : 11/5/21 ([facility]) b/l dx mmg and US: heterogeneously dense. Corresponding to the patient's palpable finding is a 3.9 cm mass in the left breast 1-2 o'clock which is suspicious for malignancy. This would correspond to the area of architectural distortion seen on mammography. Recommend ultrasound-guided core biopsy and clip placement. BI-RADS 4.\par \par 11/9/21 ([facility] path) US bx:1. Left breast 1:00-2:00 5cm fn, core biopsy:  Invasive moderately differentiated mammary carcinoma with associated calcifications - Focal mammary carcinoma in situ. IHC: ESTROGEN RECEPTOR: POSITIVE 45% NUCLEAR STAINING WITH WEAK/MODERATE 1+/2+ INTENSITY PROGESTERONE RECEPTOR: POSITIVE 30% NUCLEAR STAINING WITH MODERATE 2+ INTENSITY HER-2: NEGATIVE (0 MEMBRANOUS STAINING) \par \par 11/23/21 ([facility]) MRI: 1. MR guided core biopsy or clip placement for the right 10:00 axis mass enhancement. 2. Appropriate action for the large left breast cancer.  3. Targeted left axillary ultrasound and possible ultrasound core biopsy based on a palpable abnormality in the left axilla by the referring clinician, which is likely not included on this study. BI-RADS 4. \par  \par 12/9/21 ([facility]) Left axilla US:1. No adenopathy. 2. Palpable finding in the left axillary tail is the posterior extent of the biopsy proven malignancy. BI-RADS 6.\par \par 12/9/21 ([facility]) MRI biopsy not performed: Multiple attempts were made to target the 0.6 cm focus of nonmass enhancement in the right breast. Unfortunately, significant patient motion within the grid, during imaging including between sequences, limited ability to place tissue marker clip, or perform biopsy.Given canceled biopsy recommend targeted right breast ultrasound. If no sonographic correlate is present, then short interval follow-up, 4 months, would be recommended. \par \par 2/1/22 ([facility]) Surgical path: 1. Breast, left, retroareolar tissue; excision: - Benign breast tissue. 2. Breast, left, new anterior margin; excision: - Microinvasive carcinoma (1.0 mm) involving inked margin, in a background of atypical lobular hyperplasia (ALH), aprocrine metaplasia and fibrocystic changes - See Note.\par \par 2/8/22 ([facility]) Surgical path: 1. Breast and capsule, left; mastectomy- Invasive ductal carcinoma, measuring 4.0 cm grossly, see note and synoptic report - Ductal carcinoma in situ (DCIS), cribriform type with intermediate nuclear grade\par - Margins of invasive carcinoma: Anterior at lower outer quadrant: less than 1 mm Posterior: less than 1 mm - Margins of DCIS: Posterior: 2 mm Anterior: 5 mm - Lobular carcinoma in situ (LCIS) - Positive for lymphovascular invasion - Biopsy site changes - Benign skeletal muscle and capsule - Calcifications associated with invasive carcinoma, in situ carcinoma and benign epithelium\par 2. Posterior margin, left breast, excision: - Benign fibroadipose tissue\par 3. Lymph node, left axilla, sentinel, excision: - One out of two lymph nodes positive for metastatic carcinoma (1/2) - Metastatic focus measures 4 mm - Negative for extranodal extension \par 4. Breast, left, new anterior margin, 2:00 1 cm FN; excision: - Benign breast tissue \par 5. Breast, left, new anterior margin, 5:30 6 cm; excision: - Benign breast tissue\par  6. Breast, left, mastectomy skin; excision: - Benign skin with changes of prior procedure \par \par 10/4/2022 ([facility]) bilateral axillary US showing A targeted bilateral breast ultrasound was performed.\par FINDINGS:The right axilla appears normal. There are no masses there is no lymphadenopathy. IMPRESSION: Normal right axilla. Clinical correlation and assessment. ASSESSMENT: BI-RADS Category 2:  Benign.

## 2023-03-14 NOTE — PHYSICAL EXAM
[EOMI] : extra ocular movement intact [Normocephalic] : normocephalic [Supple] : supple [Examined in the supine and seated position] : examined in the supine and seated position [No dominant masses] : no dominant masses in right breast  [No dominant masses] : no dominant masses left breast [No Nipple Retraction] : no right nipple retraction [No Nipple Discharge] : no left nipple discharge [No Axillary Lymphadenopathy] : no left axillary lymphadenopathy [No Rashes] : no rashes [de-identified] : well-healed mastectomy incision [de-identified] : well healed mastectomy incision, TE appears intact

## 2023-03-20 ENCOUNTER — NON-APPOINTMENT (OUTPATIENT)
Age: 53
End: 2023-03-20

## 2023-03-29 ENCOUNTER — APPOINTMENT (OUTPATIENT)
Dept: PLASTIC SURGERY | Facility: CLINIC | Age: 53
End: 2023-03-29
Payer: MEDICAID

## 2023-03-29 ENCOUNTER — APPOINTMENT (OUTPATIENT)
Dept: INFUSION THERAPY | Facility: CLINIC | Age: 53
End: 2023-03-29

## 2023-03-29 PROCEDURE — 99213 OFFICE O/P EST LOW 20 MIN: CPT | Mod: 95

## 2023-03-29 NOTE — HISTORY OF PRESENT ILLNESS
[Home] : at home, [unfilled] , at the time of the visit. [Other Location: e.g. Home (Enter Location, City,State)___] : at [unfilled] [Verbal consent obtained from patient] : the patient, [unfilled] [FreeTextEntry1] : Patient Name: MARY REYES \par : 1970 \par Date: 2023 \par Attending: Dr. Jose R Barry\par \par HPI: preop consultation for delayed right breast tissue expander placement on 23.\par \par Allergies: isosulfan blue \par Medication prescribed: percocet 5-325 mg \par \par ROS: complete 14 point review of systems negative except pertinent items reviewed in the HPI. Other non-contributory items reviewed in our new patient questionnaire and we have submitted it to be scanned into the medical record. \par \par Physical Exam: \par completed at time of in office evaluation \par \par Assessment/Plan:\par We have discussed pre and postop instructions, recovery limitations, restrictions and expectations, ERAS protocol, NPO status, transportation home, postop medications, COVID-19 policies, benefits and risks of the procedure. Pre-op labs reviewed. The patient would like to proceed with surgery as scheduled.\par \par JERO MARCOS NP\par \par

## 2023-03-30 ENCOUNTER — NON-APPOINTMENT (OUTPATIENT)
Age: 53
End: 2023-03-30

## 2023-04-05 NOTE — ASU PATIENT PROFILE, ADULT - AS SC BRADEN FRICTION
Patient here for blood pressure check. After sitting 5 minutes, blood pressure today was 122/72.
(3) no apparent problem

## 2023-04-05 NOTE — ASU PATIENT PROFILE, ADULT - FALL HARM RISK - UNIVERSAL INTERVENTIONS
Bed in lowest position, wheels locked, appropriate side rails in place/Call bell, personal items and telephone in reach/Instruct patient to call for assistance before getting out of bed or chair/Non-slip footwear when patient is out of bed/[place] to call system/Physically safe environment - no spills, clutter or unnecessary equipment/Purposeful Proactive Rounding/Room/bathroom lighting operational, light cord in reach

## 2023-04-05 NOTE — ASU PATIENT PROFILE, ADULT - NS PREOP UNDERSTANDS INFO
yes spoke to patient to be NPO/NO solid foods after 2200 pm on tonight, allow to drink water till 1-2 am , dress comfortable, leave all valuable at home,  bring ID photo. insurance and cards,  escort arranged address and telephone given to patient/yes

## 2023-04-06 ENCOUNTER — NON-APPOINTMENT (OUTPATIENT)
Age: 53
End: 2023-04-06

## 2023-04-06 ENCOUNTER — TRANSCRIPTION ENCOUNTER (OUTPATIENT)
Age: 53
End: 2023-04-06

## 2023-04-06 RX ORDER — CHLORHEXIDINE GLUCONATE 213 G/1000ML
1 SOLUTION TOPICAL DAILY
Refills: 0 | Status: DISCONTINUED | OUTPATIENT
Start: 2023-04-07 | End: 2023-04-07

## 2023-04-07 ENCOUNTER — OUTPATIENT (OUTPATIENT)
Dept: OUTPATIENT SERVICES | Facility: HOSPITAL | Age: 53
LOS: 1 days | Discharge: HOME | End: 2023-04-07
Payer: MEDICAID

## 2023-04-07 ENCOUNTER — TRANSCRIPTION ENCOUNTER (OUTPATIENT)
Age: 53
End: 2023-04-07

## 2023-04-07 VITALS
HEART RATE: 68 BPM | SYSTOLIC BLOOD PRESSURE: 128 MMHG | OXYGEN SATURATION: 98 % | DIASTOLIC BLOOD PRESSURE: 75 MMHG | RESPIRATION RATE: 14 BRPM

## 2023-04-07 VITALS
HEIGHT: 71 IN | WEIGHT: 141.32 LBS | OXYGEN SATURATION: 100 % | RESPIRATION RATE: 17 BRPM | TEMPERATURE: 98 F | SYSTOLIC BLOOD PRESSURE: 139 MMHG | DIASTOLIC BLOOD PRESSURE: 83 MMHG | HEART RATE: 64 BPM

## 2023-04-07 DIAGNOSIS — Z98.82 BREAST IMPLANT STATUS: Chronic | ICD-10-CM

## 2023-04-07 DIAGNOSIS — Z90.13 ACQUIRED ABSENCE OF BILATERAL BREASTS AND NIPPLES: Chronic | ICD-10-CM

## 2023-04-07 DIAGNOSIS — Z98.890 OTHER SPECIFIED POSTPROCEDURAL STATES: Chronic | ICD-10-CM

## 2023-04-07 DIAGNOSIS — N88.8 OTHER SPECIFIED NONINFLAMMATORY DISORDERS OF CERVIX UTERI: Chronic | ICD-10-CM

## 2023-04-07 PROCEDURE — 19357 TISS XPNDR PLMT BRST RCNSTJ: CPT | Mod: RT

## 2023-04-07 DEVICE — XPND TISSUE BREAST SMOOTH MOD VAR PROJ 500CC: Type: IMPLANTABLE DEVICE | Site: RIGHT | Status: FUNCTIONAL

## 2023-04-07 RX ORDER — DIAZEPAM 5 MG
2.5 TABLET ORAL ONCE
Refills: 0 | Status: DISCONTINUED | OUTPATIENT
Start: 2023-04-07 | End: 2023-04-07

## 2023-04-07 RX ORDER — FENTANYL CITRATE 50 UG/ML
25 INJECTION INTRAVENOUS ONCE
Refills: 0 | Status: DISCONTINUED | OUTPATIENT
Start: 2023-04-07 | End: 2023-04-07

## 2023-04-07 RX ORDER — ONDANSETRON 8 MG/1
4 TABLET, FILM COATED ORAL ONCE
Refills: 0 | Status: DISCONTINUED | OUTPATIENT
Start: 2023-04-07 | End: 2023-04-07

## 2023-04-07 RX ORDER — DIAZEPAM 5 MG
5 TABLET ORAL ONCE
Refills: 0 | Status: DISCONTINUED | OUTPATIENT
Start: 2023-04-07 | End: 2023-04-07

## 2023-04-07 RX ORDER — OXYCODONE AND ACETAMINOPHEN 5; 325 MG/1; MG/1
1 TABLET ORAL
Qty: 0 | Refills: 0 | DISCHARGE

## 2023-04-07 RX ORDER — ACETAMINOPHEN 500 MG
1000 TABLET ORAL ONCE
Refills: 0 | Status: COMPLETED | OUTPATIENT
Start: 2023-04-07 | End: 2023-04-07

## 2023-04-07 RX ORDER — APREPITANT 80 MG/1
40 CAPSULE ORAL ONCE
Refills: 0 | Status: COMPLETED | OUTPATIENT
Start: 2023-04-07 | End: 2023-04-07

## 2023-04-07 RX ORDER — OXYCODONE HYDROCHLORIDE 5 MG/1
5 TABLET ORAL ONCE
Refills: 0 | Status: DISCONTINUED | OUTPATIENT
Start: 2023-04-07 | End: 2023-04-07

## 2023-04-07 RX ORDER — ALPRAZOLAM 0.25 MG
0.25 TABLET ORAL
Qty: 0 | Refills: 0 | DISCHARGE

## 2023-04-07 RX ORDER — FENTANYL CITRATE 50 UG/ML
50 INJECTION INTRAVENOUS ONCE
Refills: 0 | Status: DISCONTINUED | OUTPATIENT
Start: 2023-04-07 | End: 2023-04-07

## 2023-04-07 RX ORDER — SODIUM CHLORIDE 9 MG/ML
1000 INJECTION, SOLUTION INTRAVENOUS
Refills: 0 | Status: DISCONTINUED | OUTPATIENT
Start: 2023-04-07 | End: 2023-04-07

## 2023-04-07 RX ADMIN — FENTANYL CITRATE 25 MICROGRAM(S): 50 INJECTION INTRAVENOUS at 10:40

## 2023-04-07 RX ADMIN — Medication 1000 MILLIGRAM(S): at 06:41

## 2023-04-07 RX ADMIN — OXYCODONE HYDROCHLORIDE 5 MILLIGRAM(S): 5 TABLET ORAL at 10:30

## 2023-04-07 RX ADMIN — OXYCODONE HYDROCHLORIDE 5 MILLIGRAM(S): 5 TABLET ORAL at 11:20

## 2023-04-07 RX ADMIN — FENTANYL CITRATE 25 MICROGRAM(S): 50 INJECTION INTRAVENOUS at 10:29

## 2023-04-07 RX ADMIN — FENTANYL CITRATE 50 MICROGRAM(S): 50 INJECTION INTRAVENOUS at 09:13

## 2023-04-07 RX ADMIN — APREPITANT 40 MILLIGRAM(S): 80 CAPSULE ORAL at 06:41

## 2023-04-07 RX ADMIN — Medication 2.5 MILLIGRAM(S): at 09:51

## 2023-04-07 RX ADMIN — FENTANYL CITRATE 50 MICROGRAM(S): 50 INJECTION INTRAVENOUS at 09:25

## 2023-04-07 RX ADMIN — Medication 5 MILLIGRAM(S): at 11:42

## 2023-04-07 NOTE — ASU DISCHARGE PLAN (ADULT/PEDIATRIC) - PAIN MANAGEMENT
Prescription given to patient/guardian/Prescription called to pharmacy/Take over the counter pain medication/Prescriptions electronically submitted to pharmacy from doctor's office

## 2023-04-07 NOTE — ASU DISCHARGE PLAN (ADULT/PEDIATRIC) - NS MD DC FALL RISK RISK
For information on Fall & Injury Prevention, visit: [email or web address] OR  [email or web address] OR  https://www.cdc.gov/steadi/patient.html

## 2023-04-07 NOTE — ASU DISCHARGE PLAN (ADULT/PEDIATRIC) - CARE PROVIDER_API CALL
Jose R Barry)  Plastic Surgery  [address] [address]  Phone: [phone]  Fax: [phone]  Follow Up Time: Routine

## 2023-04-10 ENCOUNTER — APPOINTMENT (OUTPATIENT)
Dept: PLASTIC SURGERY | Facility: CLINIC | Age: 53
End: 2023-04-10
Payer: MEDICAID

## 2023-04-10 PROCEDURE — 99024 POSTOP FOLLOW-UP VISIT: CPT

## 2023-04-11 NOTE — SURGICAL HISTORY
[de-identified] : 01/13/22: bilateral tissue expander breast reconstruction [de-identified] : 02/08/22: Mastectomy [de-identified] : 03/10/22: Bilateral T/E Placement [de-identified] : 03/11/22: Right T/E Removal [de-identified] : 10/14/22: delayed right breast tissue expander placement  [de-identified] : 11/17/22: Infected tissue expander removal [de-identified] : 04/07/23: delayed right breast tissue expander placement

## 2023-04-11 NOTE — HISTORY OF PRESENT ILLNESS
[FreeTextEntry1] : 53 y/o female presents 3 days s/p delayed right breast tissue expander placement. Denies any f/c/n/v. Patient is taking percocet for the pain and augmentin as prescribed. Patient has 1 JAKE drain in place, not ready for removal.\par \par PE: right TE in place, Incisions c/d/i, no collections or s/sx of infection\par Gauze to incision\par Complete rx for augmentin\par PO instructions reviewed\par RTC in 1 week for possible drain removal\par \par

## 2023-04-17 ENCOUNTER — APPOINTMENT (OUTPATIENT)
Dept: PLASTIC SURGERY | Facility: CLINIC | Age: 53
End: 2023-04-17
Payer: MEDICAID

## 2023-04-17 PROCEDURE — 99024 POSTOP FOLLOW-UP VISIT: CPT

## 2023-04-17 RX ORDER — OXYCODONE AND ACETAMINOPHEN 5; 325 MG/1; MG/1
5-325 TABLET ORAL
Qty: 20 | Refills: 0 | Status: DISCONTINUED | COMMUNITY
Start: 2023-03-29 | End: 2023-04-17

## 2023-04-17 RX ORDER — OXYCODONE AND ACETAMINOPHEN 5; 325 MG/1; MG/1
5-325 TABLET ORAL
Qty: 12 | Refills: 0 | Status: DISCONTINUED | COMMUNITY
Start: 2023-04-12 | End: 2023-04-17

## 2023-04-17 NOTE — SURGICAL HISTORY
[de-identified] : 01/13/22: bilateral tissue expander breast reconstruction [de-identified] : 03/10/22: Bilateral T/E Placement [de-identified] : 02/08/22: Mastectomy [de-identified] : 03/11/22: Right T/E Removal [de-identified] : 10/14/22: delayed right breast tissue expander placement  [de-identified] : 11/17/22: Infected tissue expander removal [de-identified] : 04/07/23: delayed right breast tissue expander placement

## 2023-04-17 NOTE — HISTORY OF PRESENT ILLNESS
[FreeTextEntry1] : 53 y/o female presents 10 days s/p delayed right breast tissue expander placement. Denies any f/c/n/v. Patient is not taking any pain medication. Patient c/o right nipple burning sensation. Patient has 1 JAKE drain in place, not ready for removal. \par \par PE: right TE in place, Incision healing well, no collections or s/sx of infection, reinforced JAKE drain site with CHG dressing\par Gauze to incision\par PO instructions reviewed\par Will f/u with drain totals on Thursday\par RTC in 1 week, possible drain removal\par \par

## 2023-04-20 ENCOUNTER — NON-APPOINTMENT (OUTPATIENT)
Age: 53
End: 2023-04-20

## 2023-04-24 ENCOUNTER — APPOINTMENT (OUTPATIENT)
Dept: PLASTIC SURGERY | Facility: CLINIC | Age: 53
End: 2023-04-24
Payer: MEDICAID

## 2023-04-24 VITALS
WEIGHT: 139 LBS | HEART RATE: 99 BPM | HEIGHT: 71.5 IN | OXYGEN SATURATION: 96 % | TEMPERATURE: 98.8 F | BODY MASS INDEX: 19.03 KG/M2

## 2023-04-24 PROCEDURE — 99024 POSTOP FOLLOW-UP VISIT: CPT

## 2023-04-24 NOTE — HISTORY OF PRESENT ILLNESS
[FreeTextEntry1] : 51 y/o female presents 17 days s/p delayed right breast tissue expander placement. Denies any c/n/v. Patient had a temperature of 100F yesterday, she took Tylenol. Patient c/o right nipple burning sensation. Patient has 1 JAKE drain in place, ready for removal. \par \par PE: right TE in place, Incision healing well, no collections or s/sx of infection,  AJKE drain removed\par \par PO instructions reviewed\par Rx given for augmentin\par RTC in 3 weeks\par \par

## 2023-04-24 NOTE — SURGICAL HISTORY
[de-identified] : 01/13/22: bilateral tissue expander breast reconstruction [de-identified] : 02/08/22: Mastectomy [de-identified] : 03/10/22: Bilateral T/E Placement [de-identified] : 03/11/22: Right T/E Removal [de-identified] : 10/14/22: delayed right breast tissue expander placement  [de-identified] : 11/17/22: Infected tissue expander removal [de-identified] : 04/07/23: delayed right breast tissue expander placement

## 2023-04-26 ENCOUNTER — APPOINTMENT (OUTPATIENT)
Dept: PLASTIC SURGERY | Facility: CLINIC | Age: 53
End: 2023-04-26

## 2023-04-26 ENCOUNTER — APPOINTMENT (OUTPATIENT)
Dept: PLASTIC SURGERY | Facility: CLINIC | Age: 53
End: 2023-04-26
Payer: MEDICAID

## 2023-04-26 VITALS — TEMPERATURE: 97.4 F

## 2023-04-26 PROCEDURE — 99024 POSTOP FOLLOW-UP VISIT: CPT

## 2023-04-26 NOTE — SURGICAL HISTORY
[de-identified] : 01/13/22: bilateral tissue expander breast reconstruction [de-identified] : 02/08/22: Mastectomy [de-identified] : 03/10/22: Bilateral T/E Placement [de-identified] : 03/11/22: Right T/E Removal [de-identified] : 10/14/22: delayed right breast tissue expander placement  [de-identified] : 11/17/22: Infected tissue expander removal [de-identified] : 04/07/23: delayed right breast tissue expander placement

## 2023-04-26 NOTE — HISTORY OF PRESENT ILLNESS
[FreeTextEntry1] : 51 y/o female presents 17 days s/p delayed right breast tissue expander placement. Denies any f/c/n/v. Patient c/o increased redness to her right breast. She has been taking augmentin as prescribed. \par Temp: 97.4F\par \par PE: right TE in place, Incision healing well, no collections or s/sx of infection, small fluid collection - attempted aspiration: scant serous fluid appreciated. Right chest medial erythema noted, marked border to monitor\par \par PO instructions reviewed\par Rx given for  extended course of augmentin\par RTC in 5 days\par \par

## 2023-05-01 ENCOUNTER — RX RENEWAL (OUTPATIENT)
Age: 53
End: 2023-05-01

## 2023-05-01 ENCOUNTER — APPOINTMENT (OUTPATIENT)
Dept: PLASTIC SURGERY | Facility: CLINIC | Age: 53
End: 2023-05-01
Payer: MEDICAID

## 2023-05-01 PROCEDURE — 99024 POSTOP FOLLOW-UP VISIT: CPT

## 2023-05-02 NOTE — HISTORY OF PRESENT ILLNESS
[FreeTextEntry1] : 53 y/o female presents 22 days s/p delayed right breast tissue expander placement. Denies any f/c/n/v. Patient is taking augmentin. Patient states her breast looks better.\par \par PE: right TE in place, Incision healing well, no collections or s/sx of infection, small fluid collection - attempted aspiration: no fluid appreciated. +right chest medial erythema \par \par PO instructions reviewed\par Rx given for bactrim\par RTC in 1 week\par \par

## 2023-05-02 NOTE — SURGICAL HISTORY
[de-identified] : 01/13/22: bilateral tissue expander breast reconstruction [de-identified] : 02/08/22: Mastectomy [de-identified] : 03/10/22: Bilateral T/E Placement [de-identified] : 03/11/22: Right T/E Removal [de-identified] : 10/14/22: delayed right breast tissue expander placement  [de-identified] : 11/17/22: Infected tissue expander removal [de-identified] : 04/07/23: delayed right breast tissue expander placement

## 2023-05-08 ENCOUNTER — APPOINTMENT (OUTPATIENT)
Dept: PLASTIC SURGERY | Facility: CLINIC | Age: 53
End: 2023-05-08
Payer: MEDICAID

## 2023-05-08 PROCEDURE — 99024 POSTOP FOLLOW-UP VISIT: CPT

## 2023-05-09 NOTE — SURGICAL HISTORY
[de-identified] : 01/13/22: bilateral tissue expander breast reconstruction [de-identified] : 02/08/22: Mastectomy [de-identified] : 03/11/22: Right T/E Removal [de-identified] : 03/10/22: Bilateral T/E Placement [de-identified] : 10/14/22: delayed right breast tissue expander placement  [de-identified] : 11/17/22: Infected tissue expander removal [de-identified] : 04/07/23: delayed right breast tissue expander placement

## 2023-05-09 NOTE — HISTORY OF PRESENT ILLNESS
[FreeTextEntry1] : 51 y/o female presents 4 weeks s/p delayed right breast tissue expander placement. Denies any f/c/n/v. Patient is taking Bactrim. \par \par PE: right TE in place, Incision healing well, no collections or s/sx of infection, , +right chest medial erythema resolved\par \par PO instructions reviewed\par Complete Rx for bactrim\par RTC in 1 week\par \par

## 2023-05-15 ENCOUNTER — APPOINTMENT (OUTPATIENT)
Dept: PLASTIC SURGERY | Facility: CLINIC | Age: 53
End: 2023-05-15
Payer: MEDICAID

## 2023-05-15 VITALS — BODY MASS INDEX: 19.03 KG/M2 | OXYGEN SATURATION: 98 % | HEIGHT: 71.5 IN | WEIGHT: 139 LBS | HEART RATE: 78 BPM

## 2023-05-15 PROCEDURE — 99024 POSTOP FOLLOW-UP VISIT: CPT

## 2023-05-15 RX ORDER — AMOXICILLIN AND CLAVULANATE POTASSIUM 875; 125 MG/1; MG/1
875-125 TABLET, COATED ORAL
Qty: 10 | Refills: 0 | Status: DISCONTINUED | COMMUNITY
Start: 2023-04-24 | End: 2023-05-15

## 2023-05-15 RX ORDER — AMOXICILLIN AND CLAVULANATE POTASSIUM 875; 125 MG/1; MG/1
875-125 TABLET, COATED ORAL TWICE DAILY
Qty: 10 | Refills: 0 | Status: DISCONTINUED | COMMUNITY
Start: 2023-04-26 | End: 2023-05-15

## 2023-05-15 RX ORDER — SULFAMETHOXAZOLE AND TRIMETHOPRIM 800; 160 MG/1; MG/1
800-160 TABLET ORAL TWICE DAILY
Qty: 20 | Refills: 0 | Status: DISCONTINUED | COMMUNITY
Start: 2023-05-01 | End: 2023-05-15

## 2023-05-15 NOTE — HISTORY OF PRESENT ILLNESS
[FreeTextEntry1] : 53 y/o female presents 5 weeks s/p delayed right breast tissue expander placement. Denies any f/c/n/v. Patient finished taking Bactrim. Denies any pain. \par \par PE: right TE in place, Incision healing well, no collections or s/sx of infection, , +right chest medial erythema resolving, much improved off antibiotics\par \par PO instructions reviewed\par Aquaphor to right breast\par RTC in 3 weeks to begin expander fills\par

## 2023-05-15 NOTE — SURGICAL HISTORY
[de-identified] : 01/13/22: bilateral tissue expander breast reconstruction [de-identified] : 02/08/22: Mastectomy [de-identified] : 03/10/22: Bilateral T/E Placement [de-identified] : 03/11/22: Right T/E Removal [de-identified] : 10/14/22: delayed right breast tissue expander placement  [de-identified] : 11/17/22: Infected tissue expander removal [de-identified] : 04/07/23: delayed right breast tissue expander placement

## 2023-05-22 ENCOUNTER — NON-APPOINTMENT (OUTPATIENT)
Age: 53
End: 2023-05-22

## 2023-05-22 ENCOUNTER — APPOINTMENT (OUTPATIENT)
Dept: PLASTIC SURGERY | Facility: CLINIC | Age: 53
End: 2023-05-22
Payer: MEDICAID

## 2023-05-22 VITALS — OXYGEN SATURATION: 96 % | TEMPERATURE: 98.5 F | HEART RATE: 68 BPM

## 2023-05-22 PROCEDURE — 99024 POSTOP FOLLOW-UP VISIT: CPT

## 2023-05-23 NOTE — HISTORY OF PRESENT ILLNESS
[FreeTextEntry1] : 53 y/o female presents 7 weeks s/p delayed right breast tissue expander placement. Denies any f/c/n/v. Patient states she had redness on Saturday. She started taking antibiotic Saturday. Denies any pain. She reports redness is improving. \par \par PE: right TE in place, Incision well healed, no collections or s/sx of infection, , +right chest inferior/LIQ erythema resolving\par Continue antibiotic x 10 days\par No restrictions\par RTC in 2 weeks for expander fill\par

## 2023-05-23 NOTE — SURGICAL HISTORY
[de-identified] : 01/13/22: bilateral tissue expander breast reconstruction [de-identified] : 02/08/22: Mastectomy [de-identified] : 03/10/22: Bilateral T/E Placement [de-identified] : 03/11/22: Right T/E Removal [de-identified] : 10/14/22: delayed right breast tissue expander placement  [de-identified] : 11/17/22: Infected tissue expander removal [de-identified] : 04/07/23: delayed right breast tissue expander placement

## 2023-05-26 ENCOUNTER — NON-APPOINTMENT (OUTPATIENT)
Age: 53
End: 2023-05-26

## 2023-05-31 ENCOUNTER — NON-APPOINTMENT (OUTPATIENT)
Age: 53
End: 2023-05-31

## 2023-06-01 ENCOUNTER — RESULT REVIEW (OUTPATIENT)
Age: 53
End: 2023-06-01

## 2023-06-01 ENCOUNTER — APPOINTMENT (OUTPATIENT)
Dept: HEMATOLOGY ONCOLOGY | Facility: CLINIC | Age: 53
End: 2023-06-01
Payer: MEDICAID

## 2023-06-01 VITALS
WEIGHT: 143.38 LBS | DIASTOLIC BLOOD PRESSURE: 73 MMHG | HEIGHT: 71.5 IN | TEMPERATURE: 98 F | OXYGEN SATURATION: 99 % | BODY MASS INDEX: 19.64 KG/M2 | SYSTOLIC BLOOD PRESSURE: 124 MMHG | HEART RATE: 66 BPM | RESPIRATION RATE: 16 BRPM

## 2023-06-01 DIAGNOSIS — M85.80 OTHER SPECIFIED DISORDERS OF BONE DENSITY AND STRUCTURE, UNSPECIFIED SITE: ICD-10-CM

## 2023-06-01 DIAGNOSIS — M25.69 STIFFNESS OF OTHER SPECIFIED JOINT, NOT ELSEWHERE CLASSIFIED: ICD-10-CM

## 2023-06-01 PROCEDURE — 99214 OFFICE O/P EST MOD 30 MIN: CPT | Mod: 25

## 2023-06-01 RX ORDER — DIAPER,BRIEF,INFANT-TODD,DISP
500-10 EACH MISCELLANEOUS
Qty: 60 | Refills: 6 | Status: ACTIVE | COMMUNITY
Start: 2023-06-01 | End: 1900-01-01

## 2023-06-02 PROBLEM — M25.69 BACK STIFFNESS: Status: ACTIVE | Noted: 2022-10-04
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
## 2023-06-02 NOTE — PHYSICAL EXAM
[Fully active, able to carry on all pre-disease performance without restriction] : Status 0 - Fully active, able to carry on all pre-disease performance without restriction [Normal] : affect appropriate [de-identified] : b/l mastectomy - left breast expanded, right -  expander, some erythema on the right chest wall

## 2023-06-02 NOTE — ASSESSMENT
[FreeTextEntry1] : 51 yo premenopausal Polish woman ( fluent in English and Polish) referred by Dr. Elise for evaluation of left breast cancer.   Biopsy revealed IDC ESTROGEN RECEPTOR: POSITIVE 45% NUCLEAR STAINING WITH WEAK/MODERATE 1+/2+ INTENSITY PROGESTERONE RECEPTOR: POSITIVE 30% NUCLEAR STAINING WITH MODERATE 2+ INTENSITY HER-2: NEGATIVE (0 MEMBRANOUS STAINING) KI 67 35 %.\par \par Imaging studies with left breast mass in US 3.9 x 1.1 x 1.2 cm, MRI max dimension 5.3 cm.\par Right breast lesion - biopsy unsuccessful.\par \par Patient decided on bilateral mastectomy.  She understand indication for systemic chemotherapy based on lymph node status and molecular testing. 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 Reviewed protocols of chemotherapy including ACdd> Tweekly, TC and CMF with the last one with lowest incidence of hair loss.\par Mammaprint results c/w low recurrence score, reviewed with patient if LN positive given the fact that she is premenopausal she might have indication for adjuvant chemotherapy. If LN negative plan for AI. \par Zoladex 3.6 mg today, reviewed side effects. \par \par Patient underwent left sided mastectomy - pathology report reviewed, IDC 4 cm, T2, N1, with LVI.\par \par Patient underwent right sided mastectomy and b/l placement of the expanders, her postop course was complicated by bleeding from right side.\par \par She feels depressed and anxious - had 4 surgeries in the last 6 weeks, 1st one with anaphylactic reaction to MB, left breast mastectomy, followed by right breast mastectomy, complicated by bleeding.  \par \par Continue Zoladex since 3/23/2022\par Right breast mastectomy - pathology reviewed 3/23/2022- papilloma and ADH\par \par Anemia - blood loss-, start PO iron , ferrous gluconate PO daily in am. Hg 10.5. \par \par # Depressed mood-Lexapro- 10 mg.\par She has limited social support, lives by herself, father and mother ( dementia)  in [place],  referred to , would benefit from psychosocial support.  She is very hesitant about chemotherapy since loosing her her will affect her livelihood. \par Oncotype Dx 13, distant risk of recurrence 13%, with no benefit from chemotherapy. \par \par S/p RT June 2022 \par \par Continue ovarian suppression Zoladex 3/2022 >  Lupron 3.75 mg q 28 days> Lupron 11.25 mg 3/2/2023\par Start Anastrozole 1 mg  PO daily- hold during radiation. \par S/p radiation July 13, 2022\par \par Tx - Lupron 3.75 mg Q 4 weeks, 6/1/23\par Change to Q 3 m 11.25 mg  on 6/1/2023\par # Osteopenia - \par last bone density  March 2023\par T-score- 2.4\par Risk factors\par Recommended:\par 1. Vitamin D\par 2. Calcium supplement 500mg\par 3. Weight bearing exercises\par - dental exam - agustina call after dental exam \par - consider Prolia or Zometa \par \par \par # Needs bunion sx - to d/w Dr. Barry which should take place first.\par Dexa ordered\par Labs next visit - CBC, CMP, ferritin \par \par \par

## 2023-06-05 ENCOUNTER — APPOINTMENT (OUTPATIENT)
Dept: PLASTIC SURGERY | Facility: CLINIC | Age: 53
End: 2023-06-05
Payer: MEDICAID

## 2023-06-05 VITALS — HEIGHT: 71.5 IN | BODY MASS INDEX: 19.58 KG/M2 | OXYGEN SATURATION: 98 % | HEART RATE: 71 BPM | WEIGHT: 143 LBS

## 2023-06-05 PROCEDURE — 99024 POSTOP FOLLOW-UP VISIT: CPT

## 2023-06-05 NOTE — SURGICAL HISTORY
[de-identified] : 01/13/22: bilateral tissue expander breast reconstruction [de-identified] : 02/08/22: Mastectomy [de-identified] : 03/10/22: Bilateral T/E Placement [de-identified] : 03/11/22: Right T/E Removal [de-identified] : 10/14/22: delayed right breast tissue expander placement  [de-identified] : 11/17/22: Infected tissue expander removal [de-identified] : 04/07/23: delayed right breast tissue expander placement

## 2023-06-05 NOTE — HISTORY OF PRESENT ILLNESS
[FreeTextEntry1] : 51 y/o female presents 2 months s/p delayed right breast tissue expander placement. Denies any f/c/n/v. Patient is took tylenol today. She is having underneath her right breast. She is taking antibiotic. She reports redness is improving. Patient is here for T/E.\par \par PE: right TE in place, Incision well healed, no collections or s/sx of infection, , +right chest inferior/LIQ erythema continues to resolve\par After localizing the port of the tissue expander with the magnet, I prepped the area with chloroprep and then using a 21 gauge butterfly needle, I percutaneously accessed the port of the expander and injected 110 cc of injectable saline into the right expander. Serous fluid manually expressed.\par Totals:\par Right: 110 cc\par These are [id number]-FF-05-T expanders.\par \par Complete antibiotic \par RTC in 2 weeks for expander fill\par

## 2023-06-08 ENCOUNTER — NON-APPOINTMENT (OUTPATIENT)
Age: 53
End: 2023-06-08

## 2023-06-12 ENCOUNTER — APPOINTMENT (OUTPATIENT)
Dept: PLASTIC SURGERY | Facility: CLINIC | Age: 53
End: 2023-06-12
Payer: MEDICAID

## 2023-06-12 VITALS — HEART RATE: 82 BPM | OXYGEN SATURATION: 97 % | BODY MASS INDEX: 18.97 KG/M2 | HEIGHT: 71.5 IN | WEIGHT: 138.5 LBS

## 2023-06-12 PROCEDURE — 99024 POSTOP FOLLOW-UP VISIT: CPT

## 2023-06-12 NOTE — SURGICAL HISTORY
[de-identified] : 01/13/22: bilateral tissue expander breast reconstruction [de-identified] : 02/08/22: Mastectomy [de-identified] : 03/10/22: Bilateral T/E Placement [de-identified] : 03/11/22: Right T/E Removal [de-identified] : 10/14/22: delayed right breast tissue expander placement  [de-identified] : 11/17/22: Infected tissue expander removal [de-identified] : 04/07/23: delayed right breast tissue expander placement

## 2023-06-12 NOTE — HISTORY OF PRESENT ILLNESS
[FreeTextEntry1] : 53 y/o female presents 2 months s/p delayed right breast tissue expander placement. Denies any f/c/n/v. Patient is not taking any pain medication. She is 3 days off of antibiotics. Patient is here for T/E.\par \par PE: right TE in place, Incision well healed, no collections or s/sx of infection, , +right chest inferior/LIQ trace erythema\par After localizing the port of the tissue expander with the magnet, I prepped the area with chloroprep and then using a 21 gauge butterfly needle, I percutaneously accessed the port of the expander and injected 80 cc of injectable saline into the right expander.\par Totals:\par Right: 190 cc\par These are [id number]-T expanders.\par \par RTC in 2 weeks for expander fill\par

## 2023-06-19 ENCOUNTER — APPOINTMENT (OUTPATIENT)
Dept: PLASTIC SURGERY | Facility: CLINIC | Age: 53
End: 2023-06-19
Payer: MEDICAID

## 2023-06-19 PROCEDURE — 99024 POSTOP FOLLOW-UP VISIT: CPT

## 2023-06-20 ENCOUNTER — NON-APPOINTMENT (OUTPATIENT)
Age: 53
End: 2023-06-20

## 2023-06-20 NOTE — SURGICAL HISTORY
[de-identified] : 01/13/22: bilateral tissue expander breast reconstruction [de-identified] : 02/08/22: Mastectomy [de-identified] : 03/10/22: Bilateral T/E Placement [de-identified] : 03/11/22: Right T/E Removal [de-identified] : 10/14/22: delayed right breast tissue expander placement  [de-identified] : 11/17/22: Infected tissue expander removal [de-identified] : 04/07/23: delayed right breast tissue expander placement

## 2023-06-20 NOTE — HISTORY OF PRESENT ILLNESS
[FreeTextEntry1] : 51 y/o female presents 2 months s/p delayed right breast tissue expander placement on 04/07/23. Denies any f/c/n/v. Patient is not taking any pain medication. Patient is here for T/E.\par \par PE: right TE in place, Incision well healed, no collections or s/sx of infection, , +right inferior to IMF incision trace erythema, \par After localizing the port of the tissue expander with the magnet, I prepped the area with chloroprep and then using a 21 gauge butterfly needle, I percutaneously accessed the port of the expander and injected 70 cc of injectable saline into the right expander.\par Totals:\par Left: 420 cc\par Right: 260 cc\par These are 181R-LF-10-T expanders.\par \par RTC in 2 weeks for expander fill\par

## 2023-06-21 ENCOUNTER — NON-APPOINTMENT (OUTPATIENT)
Age: 53
End: 2023-06-21

## 2023-06-26 ENCOUNTER — APPOINTMENT (OUTPATIENT)
Dept: PLASTIC SURGERY | Facility: CLINIC | Age: 53
End: 2023-06-26
Payer: MEDICAID

## 2023-06-26 PROCEDURE — 99024 POSTOP FOLLOW-UP VISIT: CPT

## 2023-06-26 NOTE — HISTORY OF PRESENT ILLNESS
[FreeTextEntry1] : 51 y/o female presents 2 months s/p delayed right breast tissue expander placement on 04/07/23. Denies any f/c/n/v. Patient is not taking any pain medication. Developed increased erythema over right breast in context of enlarged seroma.\par \par PE: right TE in place, Incision well healed, erythema over medial breaast and collection\par \par After localizing the port of the Right tissue expander with the magnet, I prepped the area with chloroprep and then using a 21 gauge butterfly needle, I percutaneously accessed the mastectomy pocket over the port and aspirated 30 cc of serosanguineous fluid from right breast.  No fluid added to expanders.\par \par Totals:\par Left: 420 cc\par Right: 260 cc\par These are 675O-CR-61-T expanders.\par \par RX Bactrim DS initiated for breast erythema\par Culture sent\par \par Follow up next week to assess healing.\par

## 2023-06-26 NOTE — SURGICAL HISTORY
[de-identified] : 01/13/22: bilateral tissue expander breast reconstruction [de-identified] : 02/08/22: Mastectomy [de-identified] : 03/10/22: Bilateral T/E Placement [de-identified] : 03/11/22: Right T/E Removal [de-identified] : 10/14/22: delayed right breast tissue expander placement  [de-identified] : 11/17/22: Infected tissue expander removal [de-identified] : 04/07/23: delayed right breast tissue expander placement

## 2023-07-05 LAB — GRAM STN ASPIRATE: ABNORMAL

## 2023-07-06 ENCOUNTER — APPOINTMENT (OUTPATIENT)
Dept: PLASTIC SURGERY | Facility: CLINIC | Age: 53
End: 2023-07-06
Payer: MEDICAID

## 2023-07-06 PROCEDURE — 99213 OFFICE O/P EST LOW 20 MIN: CPT | Mod: 24

## 2023-07-10 NOTE — HISTORY OF PRESENT ILLNESS
[FreeTextEntry1] : 54 y/o female presents 3 months s/p delayed right breast tissue expander placement on 04/07/23. Denies any f/c/n/v. Patient is not taking any pain medication. She was taking cipro changed to augmentin yesterday.  Developed increased erythema over right breast in context of enlarged seroma.\par \par PE: right TE in place, Incision well healed, erythema over medial breast and collection\par \par After localizing the port of the Right tissue expander with the magnet, I prepped the area with chloroprep and then using a 21 gauge butterfly needle, I percutaneously accessed the mastectomy pocket over the port and aspirated 20 cc of serosanguineous fluid from right breast.  No fluid added to expanders. 50 cc's removed from right expander.\par Totals:\par Left: 420 cc\par Right: 210 cc\par These are 030W-[id number]-89-T expanders.\par \par Complete augmentin \par Discussed possibility of delayed right breast stacked NICOLETTE flap reconstruction if tissue expander needs to be removed. The nature of surgery, its risks, benefits, alternatives, expected postoperative course, recovery and long term results were discussed in detail. All questions were answered. CTA ordered to assess perforators. \par Today we reviewed all options for breast reconstruction including implant and autologous options.\par Nature of each surgery, its risks, benefits, alternatives, expected postoperative course, recovery and long term results were reviewed.\par Staged nature of surgery, with a planned revision phase reviewed.\par Risks specific to microsurgical tissue transfer including partial or total flap loss were reviewed.\par All questions answered. Ms. REYES expressed understanding and agreed to proceed.\par Will obtain preoperative CT angiogram to map vascular perforators.\par Will coordinate surgery for the near future.\par \par

## 2023-07-10 NOTE — SURGICAL HISTORY
[de-identified] : 01/13/22: bilateral tissue expander breast reconstruction [de-identified] : 02/08/22: Mastectomy [de-identified] : 03/10/22: Bilateral T/E Placement [de-identified] : 03/11/22: Right T/E Removal [de-identified] : 11/17/22: Infected tissue expander removal [de-identified] : 10/14/22: delayed right breast tissue expander placement  [de-identified] : 04/07/23: delayed right breast tissue expander placement

## 2023-07-12 ENCOUNTER — APPOINTMENT (OUTPATIENT)
Dept: PLASTIC SURGERY | Facility: CLINIC | Age: 53
End: 2023-07-12
Payer: MEDICAID

## 2023-07-12 ENCOUNTER — APPOINTMENT (OUTPATIENT)
Dept: CT IMAGING | Facility: CLINIC | Age: 53
End: 2023-07-12
Payer: MEDICAID

## 2023-07-12 ENCOUNTER — OUTPATIENT (OUTPATIENT)
Dept: OUTPATIENT SERVICES | Facility: HOSPITAL | Age: 53
LOS: 1 days | End: 2023-07-12

## 2023-07-12 DIAGNOSIS — Z98.82 BREAST IMPLANT STATUS: Chronic | ICD-10-CM

## 2023-07-12 DIAGNOSIS — N88.8 OTHER SPECIFIED NONINFLAMMATORY DISORDERS OF CERVIX UTERI: Chronic | ICD-10-CM

## 2023-07-12 DIAGNOSIS — Z98.890 OTHER SPECIFIED POSTPROCEDURAL STATES: Chronic | ICD-10-CM

## 2023-07-12 DIAGNOSIS — Z90.13 ACQUIRED ABSENCE OF BILATERAL BREASTS AND NIPPLES: Chronic | ICD-10-CM

## 2023-07-12 PROCEDURE — 74174 CTA ABD&PLVS W/CONTRAST: CPT | Mod: 26

## 2023-07-12 PROCEDURE — 99214 OFFICE O/P EST MOD 30 MIN: CPT | Mod: 95

## 2023-07-13 NOTE — ASSESSMENT
[FreeTextEntry1] : Patient Name: MARY REYES \par : 1970 \par Date: 2023 \par Attending: Dr. Oren Lerman\par \par HPI:\par \par Allergies:\par Medication prescribed: \par \par ROS: complete 14 point review of systems negative except pertinent items reviewed in the HPI. Other non-contributory items reviewed in our new patient questionnaire and we have submitted it to be scanned into the medical record. \par \par Physical Exam: \par completed at time of in office evaluation \par \par Assessment/Plan:\par We have discussed pre and postop instructions, recovery limitations, restrictions and expectations, ERAS protocol, NPO status, transportation home, postop medications, benefits and risks of the procedure. Pre-op labs pending. CTA completed today, pending results.  The patient would like to proceed with surgery as scheduled.\par \par JERO MARCOS NP\par \par

## 2023-07-13 NOTE — HISTORY OF PRESENT ILLNESS
[Home] : at home, [unfilled] , at the time of the visit. [Other Location: e.g. Home (Enter Location, City,State)___] : at [unfilled] [Verbal consent obtained from patient] : the patient, [unfilled] [FreeTextEntry1] : Patient Name: MARY REYES \par : 1970 \par Date: 2023 \par Attending: Dr. Jose R Barry\par \par HPI: preop consultation for right breast delayed NICOLETTE flap breast reconstruction on \par \par Allergies: isosulfan blue soln\par Medication prescribed: aspirin 325 mg, valium 5 mg, percocet 5-325 mg\par \par ROS: complete 14 point review of systems negative except pertinent items reviewed in the HPI. Other non-contributory items reviewed in our new patient questionnaire and we have submitted it to be scanned into the medical record. \par \par Physical Exam: \par completed at time of in office evaluation \par \par Assessment/Plan:\par We have discussed pre and postop instructions, recovery limitations, restrictions and expectations, ERAS protocol, NPO status, transportation home, postop medications, , benefits and risks of the procedure. Pre-op labs pending. CTA reviewed. She will check with Dr. Marlow to see if she needs to hold anastrazole prior to/after surgery. The patient would like to proceed with surgery as scheduled.\par \par JERO MARCOS NP\par \par

## 2023-07-17 ENCOUNTER — APPOINTMENT (OUTPATIENT)
Dept: PLASTIC SURGERY | Facility: CLINIC | Age: 53
End: 2023-07-17
Payer: MEDICAID

## 2023-07-17 DIAGNOSIS — N64.89 OTHER SPECIFIED DISORDERS OF BREAST: ICD-10-CM

## 2023-07-17 PROCEDURE — 99024 POSTOP FOLLOW-UP VISIT: CPT

## 2023-07-17 NOTE — HISTORY OF PRESENT ILLNESS
[FreeTextEntry1] : 52 y/o female presents 3 months s/p delayed right breast tissue expander placement on 04/07/23. Denies any f/c/n/v. Patient is not taking any pain medication. She is taking augmentin prescribed over the weekend due to right breast pain and erythema.  \par \par PE: right TE in place, Incision well healed, erythema over medial breast and collection\par After localizing the port of the Right tissue expander with the magnet, I prepped the area with chloroprep and then using a 21 gauge butterfly needle, I percutaneously accessed the mastectomy pocket over the port and aspirated 90 cc of serosanguineous fluid from right breast.  No fluid added to expanders.\par Totals:\par Left: 420 cc\par Right: 210 cc\par These are [id number]-GK-97-T expanders.\par \par Will plan for surgery on Thursday. She would like to proceed with bilateral NICOLETTE reconstruction. \par \par \par

## 2023-07-19 VITALS
RESPIRATION RATE: 16 BRPM | DIASTOLIC BLOOD PRESSURE: 75 MMHG | HEIGHT: 71 IN | TEMPERATURE: 98 F | OXYGEN SATURATION: 97 % | WEIGHT: 141.98 LBS | SYSTOLIC BLOOD PRESSURE: 120 MMHG | HEART RATE: 72 BPM

## 2023-07-19 RX ORDER — ANASTROZOLE 1 MG/1
1 TABLET ORAL
Qty: 0 | Refills: 0 | DISCHARGE

## 2023-07-19 NOTE — PATIENT PROFILE ADULT - STATED REASON FOR ADMISSION
bilateral NICOLETTE flap, right breast capsulectomy, right internal mammary lymph node biopsy, right rib resection

## 2023-07-20 ENCOUNTER — RESULT REVIEW (OUTPATIENT)
Age: 53
End: 2023-07-20

## 2023-07-20 ENCOUNTER — INPATIENT (INPATIENT)
Facility: HOSPITAL | Age: 53
LOS: 2 days | Discharge: HOME | DRG: 578 | End: 2023-07-23
Attending: PLASTIC SURGERY | Admitting: PLASTIC SURGERY
Payer: COMMERCIAL

## 2023-07-20 DIAGNOSIS — Z90.13 ACQUIRED ABSENCE OF BILATERAL BREASTS AND NIPPLES: Chronic | ICD-10-CM

## 2023-07-20 DIAGNOSIS — Z98.890 OTHER SPECIFIED POSTPROCEDURAL STATES: Chronic | ICD-10-CM

## 2023-07-20 DIAGNOSIS — N88.8 OTHER SPECIFIED NONINFLAMMATORY DISORDERS OF CERVIX UTERI: Chronic | ICD-10-CM

## 2023-07-20 DIAGNOSIS — Z98.82 BREAST IMPLANT STATUS: Chronic | ICD-10-CM

## 2023-07-20 LAB
GRAM STN FLD: SIGNIFICANT CHANGE UP
SPECIMEN SOURCE: SIGNIFICANT CHANGE UP

## 2023-07-20 PROCEDURE — 15734 MUSCLE-SKIN GRAFT TRUNK: CPT | Mod: 82,RT,59

## 2023-07-20 PROCEDURE — 64912 NRV RPR W/NRV ALGRFT 1ST: CPT | Mod: LT

## 2023-07-20 PROCEDURE — 15777 ACELLULAR DERM MATRIX IMPLT: CPT | Mod: RT

## 2023-07-20 PROCEDURE — 19371 PERI-IMPLT CAPSLC BRST COMPL: CPT | Mod: RT

## 2023-07-20 PROCEDURE — 15777 ACELLULAR DERM MATRIX IMPLT: CPT | Mod: LT

## 2023-07-20 PROCEDURE — 15734 MUSCLE-SKIN GRAFT TRUNK: CPT | Mod: 82,LT,59

## 2023-07-20 PROCEDURE — 38530 BIOPSY/REMOVAL LYMPH NODES: CPT | Mod: RT

## 2023-07-20 PROCEDURE — 64912 NRV RPR W/NRV ALGRFT 1ST: CPT | Mod: 82,LT

## 2023-07-20 PROCEDURE — 19371 PERI-IMPLT CAPSLC BRST COMPL: CPT | Mod: 82,RT

## 2023-07-20 PROCEDURE — 38530 BIOPSY/REMOVAL LYMPH NODES: CPT | Mod: 82,RT

## 2023-07-20 PROCEDURE — 19364 BRST RCNSTJ FREE FLAP: CPT | Mod: 82,RT

## 2023-07-20 PROCEDURE — 21600 PARTIAL REMOVAL OF RIB: CPT | Mod: RT,59

## 2023-07-20 PROCEDURE — 19364 BRST RCNSTJ FREE FLAP: CPT | Mod: 82,LT

## 2023-07-20 DEVICE — MESH PHASIX 3X8IN: Type: IMPLANTABLE DEVICE | Status: FUNCTIONAL

## 2023-07-20 DEVICE — CLIP APPLIER ETHICON LIGACLIP 11.5" MEDIUM: Type: IMPLANTABLE DEVICE | Status: FUNCTIONAL

## 2023-07-20 DEVICE — COUPLER VESSEL MICROVASC ANAST 2MM GRN: Type: IMPLANTABLE DEVICE | Status: FUNCTIONAL

## 2023-07-20 DEVICE — GRAFT NERVE CONNECTOR 2X10MM: Type: IMPLANTABLE DEVICE | Status: FUNCTIONAL

## 2023-07-20 DEVICE — CLIP APPLIER ETHICON LIGACLIP 9 3/8" SMALL: Type: IMPLANTABLE DEVICE | Status: FUNCTIONAL

## 2023-07-20 DEVICE — CARTRIDGE MICROCLIP 30: Type: IMPLANTABLE DEVICE | Status: FUNCTIONAL

## 2023-07-20 DEVICE — COUPLER VESSEL ANASTOMOTIC 3MM: Type: IMPLANTABLE DEVICE | Status: FUNCTIONAL

## 2023-07-20 DEVICE — COUPLER VESSEL MICROVAS 1.5MM: Type: IMPLANTABLE DEVICE | Status: FUNCTIONAL

## 2023-07-20 DEVICE — DOPPLER PROBE DISPOSABLE: Type: IMPLANTABLE DEVICE | Status: FUNCTIONAL

## 2023-07-20 RX ORDER — CELECOXIB 200 MG/1
400 CAPSULE ORAL ONCE
Refills: 0 | Status: COMPLETED | OUTPATIENT
Start: 2023-07-20 | End: 2023-07-20

## 2023-07-20 RX ORDER — GABAPENTIN 400 MG/1
300 CAPSULE ORAL ONCE
Refills: 0 | Status: COMPLETED | OUTPATIENT
Start: 2023-07-20 | End: 2023-07-20

## 2023-07-20 RX ORDER — ENOXAPARIN SODIUM 100 MG/ML
40 INJECTION SUBCUTANEOUS ONCE
Refills: 0 | Status: COMPLETED | OUTPATIENT
Start: 2023-07-20 | End: 2023-07-20

## 2023-07-20 RX ORDER — SODIUM CHLORIDE 9 MG/ML
1000 INJECTION, SOLUTION INTRAVENOUS
Refills: 0 | Status: DISCONTINUED | OUTPATIENT
Start: 2023-07-20 | End: 2023-07-21

## 2023-07-20 RX ORDER — HYDROMORPHONE HYDROCHLORIDE 2 MG/ML
0.5 INJECTION INTRAMUSCULAR; INTRAVENOUS; SUBCUTANEOUS
Refills: 0 | Status: DISCONTINUED | OUTPATIENT
Start: 2023-07-20 | End: 2023-07-21

## 2023-07-20 RX ORDER — SENNA PLUS 8.6 MG/1
2 TABLET ORAL EVERY 12 HOURS
Refills: 0 | Status: DISCONTINUED | OUTPATIENT
Start: 2023-07-20 | End: 2023-07-23

## 2023-07-20 RX ORDER — CEFAZOLIN SODIUM 1 G
2000 VIAL (EA) INJECTION EVERY 8 HOURS
Refills: 0 | Status: DISCONTINUED | OUTPATIENT
Start: 2023-07-20 | End: 2023-07-21

## 2023-07-20 RX ORDER — ESCITALOPRAM OXALATE 10 MG/1
1 TABLET, FILM COATED ORAL
Qty: 0 | Refills: 0 | DISCHARGE

## 2023-07-20 RX ORDER — ALPRAZOLAM 0.25 MG
1 TABLET ORAL
Refills: 0 | DISCHARGE

## 2023-07-20 RX ORDER — METOCLOPRAMIDE HCL 10 MG
10 TABLET ORAL EVERY 6 HOURS
Refills: 0 | Status: DISCONTINUED | OUTPATIENT
Start: 2023-07-20 | End: 2023-07-23

## 2023-07-20 RX ORDER — BUPIVACAINE 13.3 MG/ML
20 INJECTION, SUSPENSION, LIPOSOMAL INFILTRATION ONCE
Refills: 0 | Status: DISCONTINUED | OUTPATIENT
Start: 2023-07-20 | End: 2023-07-21

## 2023-07-20 RX ORDER — ESCITALOPRAM OXALATE 10 MG/1
10 TABLET, FILM COATED ORAL DAILY
Refills: 0 | Status: DISCONTINUED | OUTPATIENT
Start: 2023-07-20 | End: 2023-07-23

## 2023-07-20 RX ORDER — KETOROLAC TROMETHAMINE 30 MG/ML
30 SYRINGE (ML) INJECTION EVERY 6 HOURS
Refills: 0 | Status: DISCONTINUED | OUTPATIENT
Start: 2023-07-20 | End: 2023-07-21

## 2023-07-20 RX ORDER — ACETAMINOPHEN 500 MG
1000 TABLET ORAL ONCE
Refills: 0 | Status: COMPLETED | OUTPATIENT
Start: 2023-07-20 | End: 2023-07-20

## 2023-07-20 RX ORDER — ENOXAPARIN SODIUM 100 MG/ML
40 INJECTION SUBCUTANEOUS EVERY 24 HOURS
Refills: 0 | Status: DISCONTINUED | OUTPATIENT
Start: 2023-07-21 | End: 2023-07-21

## 2023-07-20 RX ORDER — DIAZEPAM 5 MG
5 TABLET ORAL EVERY 8 HOURS
Refills: 0 | Status: DISCONTINUED | OUTPATIENT
Start: 2023-07-20 | End: 2023-07-23

## 2023-07-20 RX ORDER — ONDANSETRON 8 MG/1
4 TABLET, FILM COATED ORAL EVERY 6 HOURS
Refills: 0 | Status: DISCONTINUED | OUTPATIENT
Start: 2023-07-20 | End: 2023-07-23

## 2023-07-20 RX ORDER — ACETAMINOPHEN 500 MG
975 TABLET ORAL EVERY 8 HOURS
Refills: 0 | Status: DISCONTINUED | OUTPATIENT
Start: 2023-07-20 | End: 2023-07-21

## 2023-07-20 RX ORDER — OXYCODONE HYDROCHLORIDE 5 MG/1
10 TABLET ORAL EVERY 4 HOURS
Refills: 0 | Status: DISCONTINUED | OUTPATIENT
Start: 2023-07-20 | End: 2023-07-23

## 2023-07-20 RX ORDER — APREPITANT 80 MG/1
40 CAPSULE ORAL ONCE
Refills: 0 | Status: COMPLETED | OUTPATIENT
Start: 2023-07-20 | End: 2023-07-20

## 2023-07-20 RX ORDER — ZOLPIDEM TARTRATE 10 MG/1
5 TABLET ORAL AT BEDTIME
Refills: 0 | Status: DISCONTINUED | OUTPATIENT
Start: 2023-07-20 | End: 2023-07-23

## 2023-07-20 RX ORDER — ZOLPIDEM TARTRATE 10 MG/1
1 TABLET ORAL
Qty: 0 | Refills: 0 | DISCHARGE

## 2023-07-20 RX ORDER — OXYCODONE HYDROCHLORIDE 5 MG/1
5 TABLET ORAL EVERY 4 HOURS
Refills: 0 | Status: DISCONTINUED | OUTPATIENT
Start: 2023-07-20 | End: 2023-07-23

## 2023-07-20 RX ADMIN — ENOXAPARIN SODIUM 40 MILLIGRAM(S): 100 INJECTION SUBCUTANEOUS at 07:18

## 2023-07-20 RX ADMIN — Medication 975 MILLIGRAM(S): at 23:41

## 2023-07-20 RX ADMIN — Medication 1000 MILLIGRAM(S): at 07:17

## 2023-07-20 RX ADMIN — APREPITANT 40 MILLIGRAM(S): 80 CAPSULE ORAL at 07:17

## 2023-07-20 RX ADMIN — OXYCODONE HYDROCHLORIDE 10 MILLIGRAM(S): 5 TABLET ORAL at 23:49

## 2023-07-20 RX ADMIN — CELECOXIB 400 MILLIGRAM(S): 200 CAPSULE ORAL at 07:17

## 2023-07-20 RX ADMIN — OXYCODONE HYDROCHLORIDE 10 MILLIGRAM(S): 5 TABLET ORAL at 22:49

## 2023-07-20 RX ADMIN — Medication 100 MILLIGRAM(S): at 23:40

## 2023-07-20 RX ADMIN — GABAPENTIN 300 MILLIGRAM(S): 400 CAPSULE ORAL at 07:17

## 2023-07-20 RX ADMIN — Medication 30 MILLIGRAM(S): at 23:40

## 2023-07-20 NOTE — PRE-ANESTHESIA EVALUATION ADULT - NSANTHPMHFT_GEN_ALL_CORE
denies h/o cardiopulm dz, CVA, DM, SZ d/o, CHYNA, GERD, DVT/PE, kidney dz, liver dz, blood dyscrasias

## 2023-07-20 NOTE — PRE-ANESTHESIA EVALUATION ADULT - NSPROPOSEDPROCEDFT_GEN_ALL_CORE
Bilateral NICOLETTE flap, bilateral breast capsulectomy, bilateral internal mammary lymph node bx, bilateral rib resection, bilateral phasix mesh

## 2023-07-21 ENCOUNTER — TRANSCRIPTION ENCOUNTER (OUTPATIENT)
Age: 53
End: 2023-07-21

## 2023-07-21 LAB
ANION GAP SERPL CALC-SCNC: 8 MMOL/L — SIGNIFICANT CHANGE UP (ref 5–17)
BUN SERPL-MCNC: 14 MG/DL — SIGNIFICANT CHANGE UP (ref 7–23)
CALCIUM SERPL-MCNC: 8 MG/DL — LOW (ref 8.4–10.5)
CHLORIDE SERPL-SCNC: 106 MMOL/L — SIGNIFICANT CHANGE UP (ref 96–108)
CO2 SERPL-SCNC: 27 MMOL/L — SIGNIFICANT CHANGE UP (ref 22–31)
CREAT SERPL-MCNC: 0.53 MG/DL — SIGNIFICANT CHANGE UP (ref 0.5–1.3)
EGFR: 110 ML/MIN/1.73M2 — SIGNIFICANT CHANGE UP
GLUCOSE SERPL-MCNC: 109 MG/DL — HIGH (ref 70–99)
HCT VFR BLD CALC: 29 % — LOW (ref 34.5–45)
HGB BLD-MCNC: 9.3 G/DL — LOW (ref 11.5–15.5)
MCHC RBC-ENTMCNC: 31.4 PG — SIGNIFICANT CHANGE UP (ref 27–34)
MCHC RBC-ENTMCNC: 32.1 GM/DL — SIGNIFICANT CHANGE UP (ref 32–36)
MCV RBC AUTO: 98 FL — SIGNIFICANT CHANGE UP (ref 80–100)
NRBC # BLD: 0 /100 WBCS — SIGNIFICANT CHANGE UP (ref 0–0)
PLATELET # BLD AUTO: 186 K/UL — SIGNIFICANT CHANGE UP (ref 150–400)
POTASSIUM SERPL-MCNC: 4.6 MMOL/L — SIGNIFICANT CHANGE UP (ref 3.5–5.3)
POTASSIUM SERPL-SCNC: 4.6 MMOL/L — SIGNIFICANT CHANGE UP (ref 3.5–5.3)
RBC # BLD: 2.96 M/UL — LOW (ref 3.8–5.2)
RBC # FLD: 13.6 % — SIGNIFICANT CHANGE UP (ref 10.3–14.5)
SODIUM SERPL-SCNC: 141 MMOL/L — SIGNIFICANT CHANGE UP (ref 135–145)
WBC # BLD: 8.64 K/UL — SIGNIFICANT CHANGE UP (ref 3.8–10.5)
WBC # FLD AUTO: 8.64 K/UL — SIGNIFICANT CHANGE UP (ref 3.8–10.5)

## 2023-07-21 RX ORDER — SODIUM CHLORIDE 9 MG/ML
500 INJECTION, SOLUTION INTRAVENOUS ONCE
Refills: 0 | Status: COMPLETED | OUTPATIENT
Start: 2023-07-21 | End: 2023-07-21

## 2023-07-21 RX ORDER — ACETAMINOPHEN 500 MG
325 TABLET ORAL EVERY 6 HOURS
Refills: 0 | Status: DISCONTINUED | OUTPATIENT
Start: 2023-07-21 | End: 2023-07-21

## 2023-07-21 RX ORDER — SODIUM CHLORIDE 9 MG/ML
1000 INJECTION, SOLUTION INTRAVENOUS
Refills: 0 | Status: DISCONTINUED | OUTPATIENT
Start: 2023-07-21 | End: 2023-07-22

## 2023-07-21 RX ORDER — AMPICILLIN SODIUM AND SULBACTAM SODIUM 250; 125 MG/ML; MG/ML
3 INJECTION, POWDER, FOR SUSPENSION INTRAMUSCULAR; INTRAVENOUS EVERY 6 HOURS
Refills: 0 | Status: DISCONTINUED | OUTPATIENT
Start: 2023-07-21 | End: 2023-07-23

## 2023-07-21 RX ORDER — ACETAMINOPHEN 500 MG
975 TABLET ORAL EVERY 6 HOURS
Refills: 0 | Status: DISCONTINUED | OUTPATIENT
Start: 2023-07-21 | End: 2023-07-23

## 2023-07-21 RX ORDER — HEPARIN SODIUM 5000 [USP'U]/ML
5000 INJECTION INTRAVENOUS; SUBCUTANEOUS EVERY 8 HOURS
Refills: 0 | Status: DISCONTINUED | OUTPATIENT
Start: 2023-07-21 | End: 2023-07-23

## 2023-07-21 RX ORDER — ACETAMINOPHEN 500 MG
650 TABLET ORAL EVERY 6 HOURS
Refills: 0 | Status: DISCONTINUED | OUTPATIENT
Start: 2023-07-21 | End: 2023-07-21

## 2023-07-21 RX ADMIN — ESCITALOPRAM OXALATE 10 MILLIGRAM(S): 10 TABLET, FILM COATED ORAL at 12:54

## 2023-07-21 RX ADMIN — HEPARIN SODIUM 5000 UNIT(S): 5000 INJECTION INTRAVENOUS; SUBCUTANEOUS at 14:03

## 2023-07-21 RX ADMIN — Medication 5 MILLIGRAM(S): at 15:01

## 2023-07-21 RX ADMIN — OXYCODONE HYDROCHLORIDE 5 MILLIGRAM(S): 5 TABLET ORAL at 21:54

## 2023-07-21 RX ADMIN — AMPICILLIN SODIUM AND SULBACTAM SODIUM 200 GRAM(S): 250; 125 INJECTION, POWDER, FOR SUSPENSION INTRAMUSCULAR; INTRAVENOUS at 18:17

## 2023-07-21 RX ADMIN — OXYCODONE HYDROCHLORIDE 5 MILLIGRAM(S): 5 TABLET ORAL at 20:54

## 2023-07-21 RX ADMIN — SENNA PLUS 2 TABLET(S): 8.6 TABLET ORAL at 18:17

## 2023-07-21 RX ADMIN — SODIUM CHLORIDE 1000 MILLILITER(S): 9 INJECTION, SOLUTION INTRAVENOUS at 04:48

## 2023-07-21 RX ADMIN — AMPICILLIN SODIUM AND SULBACTAM SODIUM 200 GRAM(S): 250; 125 INJECTION, POWDER, FOR SUSPENSION INTRAMUSCULAR; INTRAVENOUS at 12:54

## 2023-07-21 RX ADMIN — Medication 30 MILLIGRAM(S): at 05:23

## 2023-07-21 RX ADMIN — OXYCODONE HYDROCHLORIDE 5 MILLIGRAM(S): 5 TABLET ORAL at 10:53

## 2023-07-21 RX ADMIN — SENNA PLUS 2 TABLET(S): 8.6 TABLET ORAL at 05:24

## 2023-07-21 RX ADMIN — HEPARIN SODIUM 5000 UNIT(S): 5000 INJECTION INTRAVENOUS; SUBCUTANEOUS at 22:02

## 2023-07-21 RX ADMIN — Medication 975 MILLIGRAM(S): at 07:49

## 2023-07-21 RX ADMIN — OXYCODONE HYDROCHLORIDE 5 MILLIGRAM(S): 5 TABLET ORAL at 11:53

## 2023-07-21 NOTE — PHYSICAL THERAPY INITIAL EVALUATION ADULT - PRECAUTIONS/LIMITATIONS, REHAB EVAL
Reviewed/educated patient on masectomy/NICOLETTE precautions; patient verbalized understanding. Reviewed/educated patient on maintaining atleast 20dg trunk flexion also and patient demonstrated understanding./surgical precautions

## 2023-07-21 NOTE — DISCHARGE NOTE PROVIDER - NSDCMRMEDTOKEN_GEN_ALL_CORE_FT
ALPRAZolam 0.5 mg oral tablet: 1 orally once a day  Ambien 5 mg oral tablet: 1 tab(s) orally once a day (at bedtime)  amoxicillin-clavulanate 875 mg-125 mg oral tablet: 1 tab(s) orally 2 times a day  anastrozole 1 mg oral tablet: 1 tab(s) orally once a day  escitalopram 10 mg oral tablet: 1 tab(s) orally once a day
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
## 2023-07-21 NOTE — DISCHARGE NOTE PROVIDER - NSDCFUSCHEDAPPT_GEN_ALL_CORE_FT
Jose R Barry  [facility] Physician [facility]  [address]  Scheduled Appointment: 07/31/2023    Brittnee Marlow  [facility]  [address]  Scheduled Appointment: 09/05/2023

## 2023-07-21 NOTE — PHYSICAL THERAPY INITIAL EVALUATION ADULT - GAIT PATTERN USED, PT EVAL
Pt ambulated from bedside to hallway with wide CHANA and mildly unsteady gait. Reports L posterior flank pain and fatigue therefore returned back to bedside./2-point gait

## 2023-07-21 NOTE — DISCHARGE NOTE PROVIDER - NSDCFUADDINST_GEN_ALL_CORE_FT
Activity:  - Rest at home during the first few days after surgery.  - Walking is encouraged, but strenuous exercise is not allowed until 6 weeks after surgery.   - Avoid strenuous activity. Do not lift your arms above your head. Do not lift more than 5-10 pounds.    Sleep:  Sleep on your back for the first two weeks after surgery.    Showering:  - You may shower. Pat dry and place dressings/bra back on as directed.    For the Breast:  You have just undergone a breast reconstruction with the NICOLETTE flap. Your breast will likely have bruising and possibly some blistering on the skin, which is expected after a mastectomy. You have a small patch of skin on your breasts, which is a different color than the surrounding breast skin. This paddle of skin comes from the abdomen and is an indicator of how the flap is doing. It is important to check this skin paddle daily. The skin should remain the same color. If the color of the small patch changes (i.e blue, purple, pale), please call the office immediately.    For the Abdomen:  Your incision and belly button are covered in a special medical grade sealant or glue.    Drains:  Both the breast and abdomen will have drains. It is important to empty the drains twice daily and record the outputs. Please bring this sheet to your appointment after surgery. Based on the output, the drains will be removed 1 to 3 weeks after surgery. For the drains to be working appropriately, the bulbs need to be collapsed to create a light suction. The nurse in the hospital will review the drain care with you and your family prior to discharge home. It is best to safely secure the drains to your clothes with a safety pin.    In an effort to make you more comfortable with discharge home and to answer any questions you may have, these instructions are for you. However, you may call the office at at any time with additional questions or concerns.    Call the Office:  Do not hesitate to call the office with any concerns or questions. A doctor is available to answer your questions 24 hours a day.   Please notify us if:  1. You have increased swelling, pain, or color change in the breast.  2. One breast becomes suddenly significantly larger than the other breast.  3. You have a sudden increase in swelling of the abdomen.  4. You have redness develop around the incisions.  5. You have a fever greater than 101 F.  6. You develop sudden increase in pain.  7. You develop drainage, spreading redness or foul odor  8. You have any questions.

## 2023-07-21 NOTE — PHYSICAL THERAPY INITIAL EVALUATION ADULT - GENERAL OBSERVATIONS, REHAB EVAL
PT IE Completed. Pt received seated in bedside recliner with LEs elevated, A&Ox4, 4JPs to breast and abdominal incisions, dopplers, SCDs (disconnected) +RA,+IV infusing, in moderate pain abdominal pain, and agreeable to work with PT, NEDA Puente notified.Pt presents s/p PODx1 NICOLETTE. PT exam shows pain limiting mobility. Pt completed bed mob, functional transfers and gait.  Pt left as found, +bed alarm, +call bell within reach, covering RN Ray notified. Pt can benefit from PT in order to improve quality of life by increasing strength/endurance/balance with functional mobility and ADLS.
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
## 2023-07-21 NOTE — DISCHARGE NOTE PROVIDER - NSDCCPTREATMENT_GEN_ALL_CORE_FT
PRINCIPAL PROCEDURE  Procedure: Breast reconstruction with NICOLETTE free flap  Findings and Treatment:

## 2023-07-21 NOTE — DISCHARGE NOTE PROVIDER - CARE PROVIDER_API CALL
Jose R Barry  Plastic Surgery  [address], [address]  [place] [address]  Phone: [phone]  Fax: [phone]  Follow Up Time: 1 week

## 2023-07-22 LAB
-  CLINDAMYCIN: SIGNIFICANT CHANGE UP
-  ERYTHROMYCIN: SIGNIFICANT CHANGE UP
-  LINEZOLID: SIGNIFICANT CHANGE UP
-  OXACILLIN: SIGNIFICANT CHANGE UP
-  RIFAMPIN: SIGNIFICANT CHANGE UP
-  TRIMETHOPRIM/SULFAMETHOXAZOLE: SIGNIFICANT CHANGE UP
-  VANCOMYCIN: SIGNIFICANT CHANGE UP
CULTURE RESULTS: SIGNIFICANT CHANGE UP
METHOD TYPE: SIGNIFICANT CHANGE UP
ORGANISM # SPEC MICROSCOPIC CNT: SIGNIFICANT CHANGE UP
ORGANISM # SPEC MICROSCOPIC CNT: SIGNIFICANT CHANGE UP
SPECIMEN SOURCE: SIGNIFICANT CHANGE UP

## 2023-07-22 RX ADMIN — AMPICILLIN SODIUM AND SULBACTAM SODIUM 200 GRAM(S): 250; 125 INJECTION, POWDER, FOR SUSPENSION INTRAMUSCULAR; INTRAVENOUS at 11:47

## 2023-07-22 RX ADMIN — HEPARIN SODIUM 5000 UNIT(S): 5000 INJECTION INTRAVENOUS; SUBCUTANEOUS at 13:53

## 2023-07-22 RX ADMIN — OXYCODONE HYDROCHLORIDE 10 MILLIGRAM(S): 5 TABLET ORAL at 13:52

## 2023-07-22 RX ADMIN — SENNA PLUS 2 TABLET(S): 8.6 TABLET ORAL at 18:47

## 2023-07-22 RX ADMIN — OXYCODONE HYDROCHLORIDE 10 MILLIGRAM(S): 5 TABLET ORAL at 20:30

## 2023-07-22 RX ADMIN — HEPARIN SODIUM 5000 UNIT(S): 5000 INJECTION INTRAVENOUS; SUBCUTANEOUS at 06:03

## 2023-07-22 RX ADMIN — SODIUM CHLORIDE 75 MILLILITER(S): 9 INJECTION, SOLUTION INTRAVENOUS at 00:43

## 2023-07-22 RX ADMIN — OXYCODONE HYDROCHLORIDE 10 MILLIGRAM(S): 5 TABLET ORAL at 14:52

## 2023-07-22 RX ADMIN — OXYCODONE HYDROCHLORIDE 5 MILLIGRAM(S): 5 TABLET ORAL at 05:50

## 2023-07-22 RX ADMIN — HEPARIN SODIUM 5000 UNIT(S): 5000 INJECTION INTRAVENOUS; SUBCUTANEOUS at 21:33

## 2023-07-22 RX ADMIN — SENNA PLUS 2 TABLET(S): 8.6 TABLET ORAL at 06:03

## 2023-07-22 RX ADMIN — ESCITALOPRAM OXALATE 10 MILLIGRAM(S): 10 TABLET, FILM COATED ORAL at 11:48

## 2023-07-22 RX ADMIN — OXYCODONE HYDROCHLORIDE 5 MILLIGRAM(S): 5 TABLET ORAL at 04:52

## 2023-07-22 RX ADMIN — OXYCODONE HYDROCHLORIDE 10 MILLIGRAM(S): 5 TABLET ORAL at 21:30

## 2023-07-22 RX ADMIN — AMPICILLIN SODIUM AND SULBACTAM SODIUM 200 GRAM(S): 250; 125 INJECTION, POWDER, FOR SUSPENSION INTRAMUSCULAR; INTRAVENOUS at 06:03

## 2023-07-22 RX ADMIN — Medication 975 MILLIGRAM(S): at 06:00

## 2023-07-22 RX ADMIN — OXYCODONE HYDROCHLORIDE 5 MILLIGRAM(S): 5 TABLET ORAL at 10:02

## 2023-07-22 RX ADMIN — Medication 975 MILLIGRAM(S): at 04:52

## 2023-07-22 RX ADMIN — AMPICILLIN SODIUM AND SULBACTAM SODIUM 200 GRAM(S): 250; 125 INJECTION, POWDER, FOR SUSPENSION INTRAMUSCULAR; INTRAVENOUS at 18:48

## 2023-07-22 RX ADMIN — AMPICILLIN SODIUM AND SULBACTAM SODIUM 200 GRAM(S): 250; 125 INJECTION, POWDER, FOR SUSPENSION INTRAMUSCULAR; INTRAVENOUS at 00:43

## 2023-07-22 RX ADMIN — OXYCODONE HYDROCHLORIDE 5 MILLIGRAM(S): 5 TABLET ORAL at 11:02

## 2023-07-22 RX ADMIN — AMPICILLIN SODIUM AND SULBACTAM SODIUM 200 GRAM(S): 250; 125 INJECTION, POWDER, FOR SUSPENSION INTRAMUSCULAR; INTRAVENOUS at 23:53

## 2023-07-22 NOTE — PROGRESS NOTE ADULT - SUBJECTIVE AND OBJECTIVE BOX
Overnight:   Pressures were soft (low 90s over 50s), 500cc fluid bolus given.       Subjective:   Pt comfortable without complaints, pain controlled  Denies CP, SOB, N/V, numbness/tingling       Objective:  Vital Signs Last 24 Hrs  T(C): 36.6 (07-21-23 @ 04:15), Max: 36.6 (07-21-23 @ 04:15)  T(F): 97.8 (07-21-23 @ 04:15), Max: 97.8 (07-21-23 @ 04:15)  HR: 61 (07-21-23 @ 04:15) (61 - 61)  BP: 93/52 (07-21-23 @ 05:29) (81/51 - 93/52)  BP(mean): --  RR: 18 (07-21-23 @ 04:15) (18 - 18)  SpO2: 96% (07-21-23 @ 04:15) (96% - 96%)    VSS  General: A&Ox3, NAD  CHEST: Dressing clean, dry, and intact, minor swelling surrounding operative site  ABD: soft, appropriately tender, nondistended, no rebound or guarding  EXTR: no LE erythema, moving all extremities    Drain output:   07-20-23 @ 07:01  -  07-21-23 @ 07:00  --------------------------------------------------------  OUT: 120 mL                
MS. Bae is doing very well this morning. No complaints other than general soreness. Her signals are great. She had one low grade temp to 100.4 which resolved with tylenol. I worked with her on IS, which is currently very low below 500. She would like to stay another day to work on getting OOB, reports she does not have much help at home. Drain output s have been 80/45/39.

## 2023-07-22 NOTE — PROVIDER CONTACT NOTE (OTHER) - ACTION/TREATMENT ORDERED:
Pt used incentive spirometer. Tylenol 975mg po prn given.
MD Alfreda Andre notified. Bolus 500 ml given

## 2023-07-22 NOTE — PROGRESS NOTE ADULT - ASSESSMENT
A/P: 53yF POD1 s/p NICOLETTE microsurgical breast reconstruction.    -Stable  -Flap checks q2    -Reduce IVF to 75cc/hr   -Reduce nasal cannula to 2L   -Transition to oral Tylenol (975mg q6hrs)   -Regular diet   -Anticipate DC tomorrow
Ms. Bae looks great this morning, she is requesting to stay another day to work on getting out of bed and to the bathroom.  -Encourage PO  -IS 10x/h  -OOB ambulate  -Continue q4h flap checks  -Plan for discharge tomorrow

## 2023-07-23 ENCOUNTER — TRANSCRIPTION ENCOUNTER (OUTPATIENT)
Age: 53
End: 2023-07-23

## 2023-07-23 VITALS
SYSTOLIC BLOOD PRESSURE: 114 MMHG | DIASTOLIC BLOOD PRESSURE: 69 MMHG | TEMPERATURE: 98 F | HEART RATE: 94 BPM | RESPIRATION RATE: 16 BRPM | OXYGEN SATURATION: 96 %

## 2023-07-23 PROCEDURE — C9399: CPT

## 2023-07-23 PROCEDURE — 87186 SC STD MICRODIL/AGAR DIL: CPT

## 2023-07-23 PROCEDURE — 88304 TISSUE EXAM BY PATHOLOGIST: CPT

## 2023-07-23 PROCEDURE — C1889: CPT

## 2023-07-23 PROCEDURE — 87070 CULTURE OTHR SPECIMN AEROBIC: CPT

## 2023-07-23 PROCEDURE — 87075 CULTR BACTERIA EXCEPT BLOOD: CPT

## 2023-07-23 PROCEDURE — 97162 PT EVAL MOD COMPLEX 30 MIN: CPT

## 2023-07-23 PROCEDURE — 86901 BLOOD TYPING SEROLOGIC RH(D): CPT

## 2023-07-23 PROCEDURE — 87102 FUNGUS ISOLATION CULTURE: CPT

## 2023-07-23 PROCEDURE — 36415 COLL VENOUS BLD VENIPUNCTURE: CPT

## 2023-07-23 PROCEDURE — 97530 THERAPEUTIC ACTIVITIES: CPT

## 2023-07-23 PROCEDURE — 88305 TISSUE EXAM BY PATHOLOGIST: CPT

## 2023-07-23 PROCEDURE — 85027 COMPLETE CBC AUTOMATED: CPT

## 2023-07-23 PROCEDURE — 80048 BASIC METABOLIC PNL TOTAL CA: CPT

## 2023-07-23 PROCEDURE — 86900 BLOOD TYPING SEROLOGIC ABO: CPT

## 2023-07-23 PROCEDURE — C1762: CPT

## 2023-07-23 PROCEDURE — 88300 SURGICAL PATH GROSS: CPT

## 2023-07-23 PROCEDURE — C1781: CPT

## 2023-07-23 PROCEDURE — 86850 RBC ANTIBODY SCREEN: CPT

## 2023-07-23 PROCEDURE — 97116 GAIT TRAINING THERAPY: CPT

## 2023-07-23 RX ADMIN — OXYCODONE HYDROCHLORIDE 10 MILLIGRAM(S): 5 TABLET ORAL at 15:32

## 2023-07-23 RX ADMIN — SENNA PLUS 2 TABLET(S): 8.6 TABLET ORAL at 05:59

## 2023-07-23 RX ADMIN — OXYCODONE HYDROCHLORIDE 10 MILLIGRAM(S): 5 TABLET ORAL at 04:17

## 2023-07-23 RX ADMIN — OXYCODONE HYDROCHLORIDE 10 MILLIGRAM(S): 5 TABLET ORAL at 10:55

## 2023-07-23 RX ADMIN — HEPARIN SODIUM 5000 UNIT(S): 5000 INJECTION INTRAVENOUS; SUBCUTANEOUS at 14:21

## 2023-07-23 RX ADMIN — ESCITALOPRAM OXALATE 10 MILLIGRAM(S): 10 TABLET, FILM COATED ORAL at 11:59

## 2023-07-23 RX ADMIN — AMPICILLIN SODIUM AND SULBACTAM SODIUM 200 GRAM(S): 250; 125 INJECTION, POWDER, FOR SUSPENSION INTRAMUSCULAR; INTRAVENOUS at 11:59

## 2023-07-23 RX ADMIN — OXYCODONE HYDROCHLORIDE 10 MILLIGRAM(S): 5 TABLET ORAL at 05:17

## 2023-07-23 RX ADMIN — Medication 10 MILLIGRAM(S): at 14:22

## 2023-07-23 RX ADMIN — HEPARIN SODIUM 5000 UNIT(S): 5000 INJECTION INTRAVENOUS; SUBCUTANEOUS at 05:59

## 2023-07-23 RX ADMIN — OXYCODONE HYDROCHLORIDE 10 MILLIGRAM(S): 5 TABLET ORAL at 16:32

## 2023-07-23 RX ADMIN — OXYCODONE HYDROCHLORIDE 10 MILLIGRAM(S): 5 TABLET ORAL at 09:55

## 2023-07-23 RX ADMIN — AMPICILLIN SODIUM AND SULBACTAM SODIUM 200 GRAM(S): 250; 125 INJECTION, POWDER, FOR SUSPENSION INTRAMUSCULAR; INTRAVENOUS at 05:59

## 2023-07-23 NOTE — DISCHARGE NOTE NURSING/CASE MANAGEMENT/SOCIAL WORK - PATIENT PORTAL LINK FT
You can access the FollowMyHealth Patient Portal offered by [facility] by registering at the following website: [email or web address]. By joining [employer]’s FollowMyHealth portal, you will also be able to view your health information using other applications (apps) compatible with our system.

## 2023-07-23 NOTE — DISCHARGE NOTE NURSING/CASE MANAGEMENT/SOCIAL WORK - NSDCPEFALRISK_GEN_ALL_CORE
For information on Fall & Injury Prevention, visit: [email or web address] OR  [email or web address] OR  https://www.cdc.gov/steadi/patient.html

## 2023-07-31 ENCOUNTER — APPOINTMENT (OUTPATIENT)
Dept: PLASTIC SURGERY | Facility: CLINIC | Age: 53
End: 2023-07-31
Payer: MEDICAID

## 2023-07-31 VITALS — HEART RATE: 82 BPM | OXYGEN SATURATION: 98 %

## 2023-07-31 PROCEDURE — 99024 POSTOP FOLLOW-UP VISIT: CPT

## 2023-07-31 NOTE — HISTORY OF PRESENT ILLNESS
[FreeTextEntry1] : 52 y/o female presents s/p delayed b/l breast reconstruction with NICOLETTE flaps. Denies any f/c/n/v. Patient is taking Percocet and valium. Patient finished taking bactrim yesterday. She is taking aspirin. Patient has 4 JAKE drains, 3 ready for removal.    PE: breasts- Incisions c/d/i, no collections or s/sx of infection, bilateral flaps warm, viable,  good skin color on skin paddles JAKE drains removed abdomen - Incisions c/d/i, no collections or s/sx of infection, umbo warm, viable. Right JAKE drain removed Shower ok Activity restrictions reviewed RTC in 1 week for left abdomen JAKE drain removal
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
## 2023-07-31 NOTE — SURGICAL HISTORY
[de-identified] : 01/13/22: bilateral tissue expander breast reconstruction [de-identified] : 02/08/22: Mastectomy [de-identified] : 03/10/22: Bilateral T/E Placement [de-identified] : 03/11/22: Right T/E Removal [de-identified] : 10/14/22: delayed right breast tissue expander placement  [de-identified] : 11/17/22: Infected tissue expander removal [de-identified] : 04/07/23: delayed right breast tissue expander placement [de-identified] : 07/20/23: bilateral stacked NICOLETTE

## 2023-08-03 DIAGNOSIS — N65.0 DEFORMITY OF RECONSTRUCTED BREAST: ICD-10-CM

## 2023-08-03 DIAGNOSIS — Z90.13 ACQUIRED ABSENCE OF BILATERAL BREASTS AND NIPPLES: ICD-10-CM

## 2023-08-03 DIAGNOSIS — Z85.3 PERSONAL HISTORY OF MALIGNANT NEOPLASM OF BREAST: ICD-10-CM

## 2023-08-03 DIAGNOSIS — R20.1 HYPOESTHESIA OF SKIN: ICD-10-CM

## 2023-08-03 DIAGNOSIS — F41.9 ANXIETY DISORDER, UNSPECIFIED: ICD-10-CM

## 2023-08-03 DIAGNOSIS — Z86.16 PERSONAL HISTORY OF COVID-19: ICD-10-CM

## 2023-08-03 DIAGNOSIS — Z91.09 OTHER ALLERGY STATUS, OTHER THAN TO DRUGS AND BIOLOGICAL SUBSTANCES: ICD-10-CM

## 2023-08-07 ENCOUNTER — APPOINTMENT (OUTPATIENT)
Dept: PLASTIC SURGERY | Facility: CLINIC | Age: 53
End: 2023-08-07
Payer: MEDICAID

## 2023-08-07 LAB — SURGICAL PATHOLOGY STUDY: SIGNIFICANT CHANGE UP

## 2023-08-07 PROCEDURE — 99024 POSTOP FOLLOW-UP VISIT: CPT

## 2023-08-07 RX ORDER — AMOXICILLIN AND CLAVULANATE POTASSIUM 875; 125 MG/1; MG/1
875-125 TABLET, COATED ORAL
Qty: 15 | Refills: 0 | Status: DISCONTINUED | COMMUNITY
Start: 2023-07-05 | End: 2023-08-07

## 2023-08-07 RX ORDER — SULFAMETHOXAZOLE AND TRIMETHOPRIM 800; 160 MG/1; MG/1
800-160 TABLET ORAL TWICE DAILY
Qty: 20 | Refills: 0 | Status: DISCONTINUED | COMMUNITY
Start: 2023-06-26 | End: 2023-08-07

## 2023-08-07 RX ORDER — AMOXICILLIN AND CLAVULANATE POTASSIUM 875; 125 MG/1; MG/1
875-125 TABLET, COATED ORAL
Qty: 20 | Refills: 0 | Status: DISCONTINUED | COMMUNITY
Start: 2023-07-16 | End: 2023-08-07

## 2023-08-07 RX ORDER — ASPIRIN 325 MG/1
325 TABLET, FILM COATED ORAL
Qty: 10 | Refills: 0 | Status: DISCONTINUED | COMMUNITY
Start: 2023-07-12 | End: 2023-08-07

## 2023-08-07 NOTE — SURGICAL HISTORY
[de-identified] : 01/13/22: bilateral tissue expander breast reconstruction [de-identified] : 02/08/22: Mastectomy [de-identified] : 03/10/22: Bilateral T/E Placement [de-identified] : 03/11/22: Right T/E Removal [de-identified] : 10/14/22: delayed right breast tissue expander placement  [de-identified] : 11/17/22: Infected tissue expander removal [de-identified] : 04/07/23: delayed right breast tissue expander placement [de-identified] : 07/20/23: bilateral stacked NICOLETTE

## 2023-08-07 NOTE — HISTORY OF PRESENT ILLNESS
[FreeTextEntry1] : 54 y/o female presents 18 days s/p delayed b/l breast reconstruction with NICOLETTE flaps on 07/20/23. Denies any f/c/n/v. Patient is taking Percocet 3 times a day and valium at night. Patient has 1 JAKE drain, ready for removal.  PE: breasts- Incisions healing well, no collections or s/sx of infection, bilateral flaps warm, viable, good skin color on skin paddles abdomen - Incisions healing well, superficial delayed healing midline, no collections or s/sx of infection, umbo warm, viable. Left JAKE drain --> serosang fluid, removed Activity restrictions reviewed Pain management discussed, will try Tylenol/ibuprofen RTC in 1 month

## 2023-08-09 ENCOUNTER — NON-APPOINTMENT (OUTPATIENT)
Age: 53
End: 2023-08-09

## 2023-08-19 LAB
CULTURE RESULTS: SIGNIFICANT CHANGE UP
SPECIMEN SOURCE: SIGNIFICANT CHANGE UP

## 2023-08-21 ENCOUNTER — OUTPATIENT (OUTPATIENT)
Dept: OUTPATIENT SERVICES | Facility: HOSPITAL | Age: 53
LOS: 1 days | End: 2023-08-21
Payer: COMMERCIAL

## 2023-08-21 ENCOUNTER — APPOINTMENT (OUTPATIENT)
Dept: NUCLEAR MEDICINE | Facility: HOSPITAL | Age: 53
End: 2023-08-21
Payer: MEDICAID

## 2023-08-21 DIAGNOSIS — Z98.890 OTHER SPECIFIED POSTPROCEDURAL STATES: Chronic | ICD-10-CM

## 2023-08-21 DIAGNOSIS — Z90.13 ACQUIRED ABSENCE OF BILATERAL BREASTS AND NIPPLES: Chronic | ICD-10-CM

## 2023-08-21 DIAGNOSIS — N88.8 OTHER SPECIFIED NONINFLAMMATORY DISORDERS OF CERVIX UTERI: Chronic | ICD-10-CM

## 2023-08-21 DIAGNOSIS — Z98.82 BREAST IMPLANT STATUS: Chronic | ICD-10-CM

## 2023-08-21 LAB — GLUCOSE BLDC GLUCOMTR-MCNC: 94 MG/DL — SIGNIFICANT CHANGE UP (ref 70–99)

## 2023-08-21 PROCEDURE — 82962 GLUCOSE BLOOD TEST: CPT

## 2023-08-21 PROCEDURE — 78815 PET IMAGE W/CT SKULL-THIGH: CPT

## 2023-08-21 PROCEDURE — A9552: CPT

## 2023-08-21 PROCEDURE — 78815 PET IMAGE W/CT SKULL-THIGH: CPT | Mod: 26,PI

## 2023-08-25 ENCOUNTER — APPOINTMENT (OUTPATIENT)
Dept: HEMATOLOGY ONCOLOGY | Facility: CLINIC | Age: 53
End: 2023-08-25
Payer: MEDICAID

## 2023-08-25 PROCEDURE — 99214 OFFICE O/P EST MOD 30 MIN: CPT | Mod: 95

## 2023-08-25 NOTE — ASSESSMENT
[FreeTextEntry1] : 53 yo premenopausal Polish woman ( fluent in English and Polish) referred by Dr. Elise for evaluation of left breast cancer.   Biopsy revealed IDC ESTROGEN RECEPTOR: POSITIVE 45% NUCLEAR STAINING WITH WEAK/MODERATE 1+/2+ INTENSITY PROGESTERONE RECEPTOR: POSITIVE 30% NUCLEAR STAINING WITH MODERATE 2+ INTENSITY HER-2: NEGATIVE (0 MEMBRANOUS STAINING) KI 67 35 %.  Imaging studies with left breast mass in US 3.9 x 1.1 x 1.2 cm, MRI max dimension 5.3 cm. Right breast lesion - biopsy unsuccessful.  Patient decided on bilateral mastectomy.  She understand indication for systemic chemotherapy based on lymph node status and molecular testing. 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 Reviewed protocols of chemotherapy including ACdd> Tweekly, TC and CMF with the last one with lowest incidence of hair loss. Mammaprint results c/w low recurrence score, reviewed with patient Oncotype Dx 13, distant risk of recurrence 13%, with no benefit from chemotherapy Patient underwent left sided mastectomy - pathology report reviewed, IDC 4 cm, T2, N1, with LVI. 2/22  Patient underwent right sided mastectomy and b/l placement of the expanders, her postop course was complicated by bleeding from right side.  She feels depressed and anxious - had 4 surgeries in the last 6 weeks, 1st one with anaphylactic reaction to MB, left breast mastectomy, followed by right breast mastectomy, complicated by bleeding.     Zoladex since 3/23/2022 Right breast mastectomy - pathology reviewed 3/23/2022- papilloma and ADH Continue ovarian suppression Zoladex 3/2022 >  Lupron 3.75 mg q 28 days> Lupron 11.25 mg 3/2/2023 Start Anastrozole 1 mg  PO daily- hold during radiation.  S/p radiation July 13, 2022 Tx - Lupron 3.75 mg Q 4 weeks, 6/1/23 Change to Q 3 m 11.25 mg  on 6/1/2023 Patient underwent bilateral Maddy reconstruction on 7/20/2023 and in the tissue removed during the procedure she was found to have left internal mammary lymph node positive for micrometastatic carcinoma 1.5 mm with extranodal extension, with molecular testing indicating triple negative breast cancer. PET scan performed on August 21, 2023 showed mildly metabolic avid subcentimeter lymph nodes in the right axilla and left internal mammary chain with SUV ranging from 1.2-1.5. I discussed with patient above findings and the plan. Obtain biopsy of the right axillary lymph node -discussed with GLENROY Olson and echo in preparation for chemotherapy. Sent email to rad onc to see if patient might have additional radiation therapy. Chemotherapy offered AC dose dense followed by paclitaxel dose dense. Patient desires cold capping as her livelihood depends on her. Patient requested social work support.  AC dd q 2 weeks Adriamycin 60 mg /m2 = 108 mg over 30 min Cytoxan 600 mg /m2 =  1080 mg over 30 min Neulasta onpro 6 mg SQ x 8 q 2 weeks Emend 150 mg IVSS Aloxi 0.25 IVP Dexamethasone 12 mg IVSS  followed by   Taxol 175  mg/m2 = 316  mg over 3H Benadryl 50 mg IV Famotidine 20mg  IV Dexamethasone 12 mg IVSS Neulasta onpro 6 mg SQ x 8 q 2 weeks  # Anemia - blood loss-, start PO iron , ferrous gluconate PO daily in am. Hg 10.5.   # Depressed mood-Lexapro- 10 mg. She has limited social support, lives by herself, father and mother ( dementia)  in [place],  referred to , would benefit from psychosocial support.  She is very hesitant about chemotherapy since loosing her her will affect her livelihood.  Oncotype Dx 13, distant risk of recurrence 13%, with no benefit from chemotherapy.   # Osteopenia -  last bone density  March 2023 T-score- 2.4 Risk factors Recommended: 1. Vitamin D 2. Calcium supplement 500mg 3. Weight bearing exercises - dental exam - agustina call after dental exam  - consider Prolia or Zometa    # Needs jakob sx - to d/w Dr. Barry which should take place first. Dexa ordered Labs next visit - CBC, CMP, ferritin
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
## 2023-08-25 NOTE — REASON FOR VISIT
[Follow-Up Visit] : a follow-up [Home] : at home, [unfilled] , at the time of the visit. [Medical Office: ([facility])___] : at the medical office located in  [FreeTextEntry2] : breast cancer

## 2023-08-25 NOTE — PHYSICAL EXAM
[Fully active, able to carry on all pre-disease performance without restriction] : Status 0 - Fully active, able to carry on all pre-disease performance without restriction [Normal] : affect appropriate [de-identified] : b/l mastectomy - left breast expanded, right -  expander, some erythema on the right chest wall

## 2023-08-31 ENCOUNTER — NON-APPOINTMENT (OUTPATIENT)
Age: 53
End: 2023-08-31

## 2023-09-01 NOTE — HISTORY OF PRESENT ILLNESS
[FreeTextEntry1] : 54 y/o female presents 7 weeks s/p delayed b/l breast reconstruction with NICOLETTE flaps on 07/20/23. Denies any f/c/n/v.   PE: breasts- Incisions healing well, no collections or s/sx of infection, bilateral flaps warm, viable, good skin color on skin paddles abdomen - Incisions healing well, superficial delayed healing midline, no collections or s/sx of infection, umbo warm, viable. Left JAKE drain --> serosang fluid, removed Activity restrictions reviewed Pain management discussed, will try Tylenol/ibuprofen RTC in 1 month

## 2023-09-01 NOTE — SURGICAL HISTORY
[de-identified] : 01/13/22: bilateral tissue expander breast reconstruction [de-identified] : 02/08/22: Mastectomy [de-identified] : 03/10/22: Bilateral T/E Placement [de-identified] : 03/11/22: Right T/E Removal [de-identified] : 10/14/22: delayed right breast tissue expander placement  [de-identified] : 11/17/22: Infected tissue expander removal [de-identified] : 04/07/23: delayed right breast tissue expander placement [de-identified] : 07/20/23: bilateral stacked NICOLETTE

## 2023-09-05 ENCOUNTER — RESULT REVIEW (OUTPATIENT)
Age: 53
End: 2023-09-05

## 2023-09-05 ENCOUNTER — APPOINTMENT (OUTPATIENT)
Dept: HEMATOLOGY ONCOLOGY | Facility: CLINIC | Age: 53
End: 2023-09-05
Payer: MEDICAID

## 2023-09-05 ENCOUNTER — NON-APPOINTMENT (OUTPATIENT)
Age: 53
End: 2023-09-05

## 2023-09-05 VITALS
RESPIRATION RATE: 16 BRPM | HEART RATE: 72 BPM | SYSTOLIC BLOOD PRESSURE: 125 MMHG | HEIGHT: 71.5 IN | WEIGHT: 141.31 LBS | BODY MASS INDEX: 19.35 KG/M2 | OXYGEN SATURATION: 98 % | TEMPERATURE: 98.4 F | DIASTOLIC BLOOD PRESSURE: 71 MMHG

## 2023-09-05 PROCEDURE — 99214 OFFICE O/P EST MOD 30 MIN: CPT | Mod: 25

## 2023-09-05 NOTE — ASSESSMENT
[FreeTextEntry1] : 54 yo premenopausal Polish woman ( fluent in English and Polish) referred by Dr. Elise for evaluation of left breast cancer.   Biopsy revealed IDC ESTROGEN RECEPTOR: POSITIVE 45% NUCLEAR STAINING WITH WEAK/MODERATE 1+/2+ INTENSITY PROGESTERONE RECEPTOR: POSITIVE 30% NUCLEAR STAINING WITH MODERATE 2+ INTENSITY HER-2: NEGATIVE (0 MEMBRANOUS STAINING) KI 67 35 %.  Imaging studies with left breast mass in US 3.9 x 1.1 x 1.2 cm, MRI max dimension 5.3 cm. Right breast lesion - biopsy unsuccessful.  Patient decided on bilateral mastectomy.  She understand indication for systemic chemotherapy based on lymph node status and molecular testing. 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 Reviewed protocols of chemotherapy including ACdd> Tweekly, TC and CMF with the last one with lowest incidence of hair loss. Mammaprint results c/w low recurrence score, reviewed with patient Oncotype Dx 13, distant risk of recurrence 13%, with no benefit from chemotherapy Patient underwent left sided mastectomy - pathology report reviewed, IDC 4 cm, T2, N1, with LVI. 2/22  Patient underwent right sided mastectomy and b/l placement of the expanders, her postop course was complicated by bleeding from right side.  She feels depressed and anxious - had 4 surgeries in the last 6 weeks, 1st one with anaphylactic reaction to MB, left breast mastectomy, followed by right breast mastectomy, complicated by bleeding.     Zoladex since 3/23/2022 Right breast mastectomy - pathology reviewed 3/23/2022- papilloma and ADH Continue ovarian suppression Zoladex 3/2022 >  Lupron 3.75 mg q 28 days> Lupron 11.25 mg 3/2/2023 Start Anastrozole 1 mg  PO daily- hold during radiation.  S/p radiation July 13, 2022 Tx - Lupron 3.75 mg Q 4 weeks, 6/1/23 Change to Q 3 m 11.25 mg  on 6/1/2023 Patient underwent bilateral Maddy reconstruction on 7/20/2023 and in the tissue removed during the procedure she was found to have left internal mammary lymph node positive for micrometastatic carcinoma 1.5 mm with extranodal extension, with molecular testing indicating triple negative breast cancer. PET scan performed on August 21, 2023 showed mildly metabolic avid subcentimeter lymph nodes in the right axilla and left internal mammary chain with SUV ranging from 1.2-1.5. I discussed with patient above findings and the plan. Obtain biopsy of the right axillary lymph node -discussed with GLENROY Olson and echo in preparation for chemotherapy. Sent email to rad onc to see if patient might have additional radiation therapy. Chemotherapy offered AC dose dense followed by paclitaxel dose dense. Patient desires cold capping as her livelihood depends on her. She has PTSD, requires cold capping.  Patient requested social work support.  AC dd q 2 weeks Adriamycin 60 mg /m2 = 108 mg over 30 min Cytoxan 600 mg /m2 =  1080 mg over 30 min Neulasta onpro 6 mg SQ x 8 q 2 weeks Emend 150 mg IVSS Aloxi 0.25 IVP Dexamethasone 12 mg IVSS  followed by   Taxol 175  mg/m2 = 316  mg over 3H Benadryl 50 mg IV Famotidine 20mg  IV Dexamethasone 12 mg IVSS Neulasta onpro 6 mg SQ x 8 q 2 weeks  Patient will have fulifila injection at [facility].   # Anemia - blood loss-, start PO iron , ferrous gluconate PO daily in am. Hg 10.5.   # Depressed mood-Lexapro- 10 mg. She has limited social support, lives by herself, father and mother ( dementia)  in [place],  referred to , would benefit from psychosocial support.  She is very hesitant about chemotherapy since loosing her her will affect her livelihood.  Oncotype Dx 13, distant risk of recurrence 13%, with no benefit from chemotherapy.   # Osteopenia -  last bone density  March 2023 T-score- 2.4 Risk factors Recommended: 1. Vitamin D 2. Calcium supplement 500mg 3. Weight bearing exercises - dental exam - agustina call after dental exam  - consider Prolia or Zometa   # Needs bunion sx - to d/w Dr. Barry which should take place first. Dexa ordered Labs next visit - CBC, CMP, ferritin

## 2023-09-05 NOTE — REASON FOR VISIT
[Follow-Up Visit] : a follow-up [Home] : at home, [unfilled] , at the time of the visit. [Medical Office: ([facility])___] : at the medical office located in  [FreeTextEntry2] : breast cancer
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
## 2023-09-05 NOTE — PHYSICAL EXAM
[Fully active, able to carry on all pre-disease performance without restriction] : Status 0 - Fully active, able to carry on all pre-disease performance without restriction [Normal] : affect appropriate [de-identified] : adriel FLAP

## 2023-09-06 ENCOUNTER — RESULT REVIEW (OUTPATIENT)
Age: 53
End: 2023-09-06

## 2023-09-06 ENCOUNTER — APPOINTMENT (OUTPATIENT)
Dept: INTERVENTIONAL RADIOLOGY/VASCULAR | Facility: HOSPITAL | Age: 53
End: 2023-09-06

## 2023-09-06 ENCOUNTER — OUTPATIENT (OUTPATIENT)
Dept: OUTPATIENT SERVICES | Facility: HOSPITAL | Age: 53
LOS: 1 days | End: 2023-09-06
Payer: COMMERCIAL

## 2023-09-06 DIAGNOSIS — Z98.890 OTHER SPECIFIED POSTPROCEDURAL STATES: Chronic | ICD-10-CM

## 2023-09-06 DIAGNOSIS — N88.8 OTHER SPECIFIED NONINFLAMMATORY DISORDERS OF CERVIX UTERI: Chronic | ICD-10-CM

## 2023-09-06 DIAGNOSIS — Z98.82 BREAST IMPLANT STATUS: Chronic | ICD-10-CM

## 2023-09-06 DIAGNOSIS — Z90.13 ACQUIRED ABSENCE OF BILATERAL BREASTS AND NIPPLES: Chronic | ICD-10-CM

## 2023-09-06 PROCEDURE — C1769: CPT

## 2023-09-06 PROCEDURE — 76882 US LMTD JT/FCL EVL NVASC XTR: CPT

## 2023-09-06 PROCEDURE — C1788: CPT

## 2023-09-06 PROCEDURE — 99152 MOD SED SAME PHYS/QHP 5/>YRS: CPT

## 2023-09-06 PROCEDURE — 76882 US LMTD JT/FCL EVL NVASC XTR: CPT | Mod: 26,RT

## 2023-09-06 PROCEDURE — 36561 INSERT TUNNELED CV CATH: CPT

## 2023-09-06 PROCEDURE — 99153 MOD SED SAME PHYS/QHP EA: CPT

## 2023-09-07 ENCOUNTER — OUTPATIENT (OUTPATIENT)
Dept: OUTPATIENT SERVICES | Facility: HOSPITAL | Age: 53
LOS: 1 days | End: 2023-09-07
Payer: COMMERCIAL

## 2023-09-07 DIAGNOSIS — N88.8 OTHER SPECIFIED NONINFLAMMATORY DISORDERS OF CERVIX UTERI: Chronic | ICD-10-CM

## 2023-09-07 DIAGNOSIS — Z98.890 OTHER SPECIFIED POSTPROCEDURAL STATES: Chronic | ICD-10-CM

## 2023-09-07 DIAGNOSIS — Z98.82 BREAST IMPLANT STATUS: Chronic | ICD-10-CM

## 2023-09-07 DIAGNOSIS — C50.912 MALIGNANT NEOPLASM OF UNSPECIFIED SITE OF LEFT FEMALE BREAST: ICD-10-CM

## 2023-09-07 PROCEDURE — 93356 MYOCRD STRAIN IMG SPCKL TRCK: CPT

## 2023-09-07 PROCEDURE — 93306 TTE W/DOPPLER COMPLETE: CPT | Mod: 26

## 2023-09-07 PROCEDURE — 76376 3D RENDER W/INTRP POSTPROCES: CPT | Mod: 26

## 2023-09-07 PROCEDURE — 93306 TTE W/DOPPLER COMPLETE: CPT

## 2023-09-08 ENCOUNTER — NON-APPOINTMENT (OUTPATIENT)
Age: 53
End: 2023-09-08

## 2023-09-08 NOTE — PHYSICAL EXAM
[Fully active, able to carry on all pre-disease performance without restriction] : Status 0 - Fully active, able to carry on all pre-disease performance without restriction [Normal] : affect appropriate [de-identified] : adriel FLAP
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
## 2023-09-08 NOTE — ASSESSMENT
[FreeTextEntry1] : 54 yo premenopausal Polish woman ( fluent in English and Polish) referred by Dr. Elise for evaluation of left breast cancer.   Biopsy revealed IDC ESTROGEN RECEPTOR: POSITIVE 45% NUCLEAR STAINING WITH WEAK/MODERATE 1+/2+ INTENSITY PROGESTERONE RECEPTOR: POSITIVE 30% NUCLEAR STAINING WITH MODERATE 2+ INTENSITY HER-2: NEGATIVE (0 MEMBRANOUS STAINING) KI 67 35 %.  Imaging studies with left breast mass in US 3.9 x 1.1 x 1.2 cm, MRI max dimension 5.3 cm. Right breast lesion - biopsy unsuccessful.  Patient decided on bilateral mastectomy.  She understand indication for systemic chemotherapy based on lymph node status and molecular testing. 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 Reviewed protocols of chemotherapy including ACdd> Tweekly, TC and CMF with the last one with lowest incidence of hair loss. Mammaprint results c/w low recurrence score, reviewed with patient Oncotype Dx 13, distant risk of recurrence 13%, with no benefit from chemotherapy Patient underwent left sided mastectomy - pathology report reviewed, IDC 4 cm, T2, N1, with LVI. 2/22  Patient underwent right sided mastectomy and b/l placement of the expanders, her postop course was complicated by bleeding from right side.  She feels depressed and anxious - had 4 surgeries in the last 6 weeks, 1st one with anaphylactic reaction to MB, left breast mastectomy, followed by right breast mastectomy, complicated by bleeding.     Zoladex since 3/23/2022 Right breast mastectomy - pathology reviewed 3/23/2022- papilloma and ADH Continue ovarian suppression Zoladex 3/2022 >  Lupron 3.75 mg q 28 days> Lupron 11.25 mg 3/2/2023 Start Anastrozole 1 mg  PO daily- hold during radiation.  S/p radiation July 13, 2022 Tx - Lupron 3.75 mg Q 4 weeks, 6/1/23 Change to Q 3 m 11.25 mg  on 6/1/2023 Patient underwent bilateral Maddy reconstruction on 7/20/2023 and in the tissue removed during the procedure she was found to have left internal mammary lymph node positive for micrometastatic carcinoma 1.5 mm with extranodal extension, with molecular testing indicating triple negative breast cancer. PET scan performed on August 21, 2023 showed mildly metabolic avid subcentimeter lymph nodes in the right axilla and left internal mammary chain with SUV ranging from 1.2-1.5. I discussed with patient above findings and the plan. Obtain biopsy of the right axillary lymph node -discussed with GLENROY Olson and echo in preparation for chemotherapy. Sent email to rad onc to see if patient might have additional radiation therapy. Chemotherapy offered AC dose dense followed by paclitaxel dose dense. Patient desires cold capping as her livelihood depends on her. She has PTSD, requires cold capping.  Patient requested social work support.  AC dd q 2 weeks Adriamycin 60 mg /m2 = 108 mg over 30 min Cytoxan 600 mg /m2 =  1080 mg over 30 min Neulasta onpro 6 mg SQ x 8 q 2 weeks Emend 150 mg IVSS Aloxi 0.25 IVP Dexamethasone 12 mg IVSS  followed by   Taxol 175  mg/m2 = 316  mg over 3H Benadryl 50 mg IV Famotidine 20mg  IV Dexamethasone 12 mg IVSS Neulasta onpro 6 mg SQ x 8 q 2 weeks  Patient will have fulifila injection at [facility].   # Anemia - blood loss-, start PO iron , ferrous gluconate PO daily in am. Hg 10.5.   # Depressed mood-Lexapro- 10 mg. She has limited social support, lives by herself, father and mother ( dementia)  in [place],  referred to , would benefit from psychosocial support.  She is very hesitant about chemotherapy since loosing her her will affect her livelihood.  Oncotype Dx 13, distant risk of recurrence 13%, with no benefit from chemotherapy.   # Osteopenia -  last bone density  March 2023 T-score- 2.4 Risk factors Recommended: 1. Vitamin D 2. Calcium supplement 500mg 3. Weight bearing exercises - dental exam - agustina call after dental exam  - consider Prolia or Zometa   # Needs bunion sx - to d/w Dr. Barry which should take place first. Dexa ordered Labs next visit - CBC, CMP, ferritin

## 2023-09-11 ENCOUNTER — RESULT REVIEW (OUTPATIENT)
Age: 53
End: 2023-09-11

## 2023-09-11 ENCOUNTER — APPOINTMENT (OUTPATIENT)
Dept: HEMATOLOGY ONCOLOGY | Facility: CLINIC | Age: 53
End: 2023-09-11
Payer: MEDICAID

## 2023-09-11 ENCOUNTER — NON-APPOINTMENT (OUTPATIENT)
Age: 53
End: 2023-09-11

## 2023-09-11 VITALS
BODY MASS INDEX: 19.09 KG/M2 | OXYGEN SATURATION: 99 % | WEIGHT: 139.38 LBS | HEIGHT: 71.5 IN | RESPIRATION RATE: 16 BRPM | HEART RATE: 68 BPM | SYSTOLIC BLOOD PRESSURE: 134 MMHG | DIASTOLIC BLOOD PRESSURE: 83 MMHG

## 2023-09-11 PROCEDURE — 99214 OFFICE O/P EST MOD 30 MIN: CPT

## 2023-09-11 RX ORDER — PEGFILGRASTIM-CBQV 6 MG/.6ML
6 INJECTION, SOLUTION SUBCUTANEOUS ONCE
Refills: 0 | Status: DISCONTINUED | OUTPATIENT
Start: 2023-09-26 | End: 2023-10-11

## 2023-09-11 RX ORDER — PEGFILGRASTIM-CBQV 6 MG/.6ML
6 INJECTION, SOLUTION SUBCUTANEOUS ONCE
Refills: 0 | Status: COMPLETED | OUTPATIENT
Start: 2023-09-12 | End: 2023-09-12

## 2023-09-12 ENCOUNTER — NON-APPOINTMENT (OUTPATIENT)
Age: 53
End: 2023-09-12

## 2023-09-12 ENCOUNTER — OUTPATIENT (OUTPATIENT)
Dept: OUTPATIENT SERVICES | Facility: HOSPITAL | Age: 53
LOS: 1 days | End: 2023-09-12
Payer: COMMERCIAL

## 2023-09-12 ENCOUNTER — APPOINTMENT (OUTPATIENT)
Dept: INFUSION THERAPY | Facility: CLINIC | Age: 53
End: 2023-09-12

## 2023-09-12 VITALS
RESPIRATION RATE: 18 BRPM | DIASTOLIC BLOOD PRESSURE: 74 MMHG | SYSTOLIC BLOOD PRESSURE: 112 MMHG | TEMPERATURE: 98 F | OXYGEN SATURATION: 99 % | HEART RATE: 78 BPM

## 2023-09-12 DIAGNOSIS — C50.912 MALIGNANT NEOPLASM OF UNSPECIFIED SITE OF LEFT FEMALE BREAST: ICD-10-CM

## 2023-09-12 DIAGNOSIS — Z98.890 OTHER SPECIFIED POSTPROCEDURAL STATES: Chronic | ICD-10-CM

## 2023-09-12 DIAGNOSIS — Z98.82 BREAST IMPLANT STATUS: Chronic | ICD-10-CM

## 2023-09-12 DIAGNOSIS — Z90.13 ACQUIRED ABSENCE OF BILATERAL BREASTS AND NIPPLES: Chronic | ICD-10-CM

## 2023-09-12 DIAGNOSIS — N88.8 OTHER SPECIFIED NONINFLAMMATORY DISORDERS OF CERVIX UTERI: Chronic | ICD-10-CM

## 2023-09-12 PROCEDURE — 96372 THER/PROPH/DIAG INJ SC/IM: CPT

## 2023-09-12 RX ADMIN — PEGFILGRASTIM-CBQV 6 MILLIGRAM(S): 6 INJECTION, SOLUTION SUBCUTANEOUS at 15:20

## 2023-09-13 ENCOUNTER — NON-APPOINTMENT (OUTPATIENT)
Age: 53
End: 2023-09-13

## 2023-09-13 ENCOUNTER — APPOINTMENT (OUTPATIENT)
Dept: INTERVENTIONAL RADIOLOGY/VASCULAR | Facility: HOSPITAL | Age: 53
End: 2023-09-13

## 2023-09-19 ENCOUNTER — NON-APPOINTMENT (OUTPATIENT)
Age: 53
End: 2023-09-19

## 2023-09-21 ENCOUNTER — NON-APPOINTMENT (OUTPATIENT)
Age: 53
End: 2023-09-21

## 2023-09-22 ENCOUNTER — NON-APPOINTMENT (OUTPATIENT)
Age: 53
End: 2023-09-22

## 2023-09-24 ENCOUNTER — NON-APPOINTMENT (OUTPATIENT)
Age: 53
End: 2023-09-24

## 2023-09-25 ENCOUNTER — RESULT REVIEW (OUTPATIENT)
Age: 53
End: 2023-09-25

## 2023-09-25 ENCOUNTER — APPOINTMENT (OUTPATIENT)
Dept: HEMATOLOGY ONCOLOGY | Facility: CLINIC | Age: 53
End: 2023-09-25
Payer: MEDICAID

## 2023-09-25 VITALS
WEIGHT: 143.19 LBS | TEMPERATURE: 97.4 F | HEART RATE: 82 BPM | HEIGHT: 71.5 IN | DIASTOLIC BLOOD PRESSURE: 79 MMHG | RESPIRATION RATE: 16 BRPM | OXYGEN SATURATION: 100 % | SYSTOLIC BLOOD PRESSURE: 131 MMHG | BODY MASS INDEX: 19.61 KG/M2

## 2023-09-25 DIAGNOSIS — Z98.82 BREAST IMPLANT STATUS: ICD-10-CM

## 2023-09-25 PROCEDURE — 99214 OFFICE O/P EST MOD 30 MIN: CPT | Mod: 25

## 2023-09-25 RX ORDER — OXYCODONE AND ACETAMINOPHEN 5; 325 MG/1; MG/1
5-325 TABLET ORAL EVERY 6 HOURS
Qty: 20 | Refills: 0 | Status: COMPLETED | COMMUNITY
Start: 2023-08-01 | End: 2023-09-25

## 2023-09-25 RX ORDER — CIPROFLOXACIN HYDROCHLORIDE 500 MG/1
500 TABLET, FILM COATED ORAL
Qty: 20 | Refills: 0 | Status: COMPLETED | COMMUNITY
Start: 2023-06-28 | End: 2023-09-25

## 2023-09-25 RX ORDER — OXYCODONE AND ACETAMINOPHEN 5; 325 MG/1; MG/1
5-325 TABLET ORAL
Qty: 15 | Refills: 0 | Status: COMPLETED | COMMUNITY
Start: 2023-07-27 | End: 2023-09-25

## 2023-09-26 ENCOUNTER — NON-APPOINTMENT (OUTPATIENT)
Age: 53
End: 2023-09-26

## 2023-09-26 ENCOUNTER — OUTPATIENT (OUTPATIENT)
Dept: OUTPATIENT SERVICES | Facility: HOSPITAL | Age: 53
LOS: 1 days | End: 2023-09-26
Payer: COMMERCIAL

## 2023-09-26 ENCOUNTER — APPOINTMENT (OUTPATIENT)
Dept: INFUSION THERAPY | Facility: CLINIC | Age: 53
End: 2023-09-26

## 2023-09-26 DIAGNOSIS — Z98.890 OTHER SPECIFIED POSTPROCEDURAL STATES: Chronic | ICD-10-CM

## 2023-09-26 DIAGNOSIS — Z98.82 BREAST IMPLANT STATUS: Chronic | ICD-10-CM

## 2023-09-26 DIAGNOSIS — N88.8 OTHER SPECIFIED NONINFLAMMATORY DISORDERS OF CERVIX UTERI: Chronic | ICD-10-CM

## 2023-09-26 DIAGNOSIS — C50.912 MALIGNANT NEOPLASM OF UNSPECIFIED SITE OF LEFT FEMALE BREAST: ICD-10-CM

## 2023-09-26 DIAGNOSIS — Z90.13 ACQUIRED ABSENCE OF BILATERAL BREASTS AND NIPPLES: Chronic | ICD-10-CM

## 2023-09-26 PROCEDURE — 96372 THER/PROPH/DIAG INJ SC/IM: CPT

## 2023-09-27 ENCOUNTER — NON-APPOINTMENT (OUTPATIENT)
Age: 53
End: 2023-09-27

## 2023-09-29 ENCOUNTER — NON-APPOINTMENT (OUTPATIENT)
Age: 53
End: 2023-09-29

## 2023-10-01 PROBLEM — Z92.3 HISTORY OF RADIATION THERAPY: Status: ACTIVE | Noted: 2022-11-21

## 2023-10-02 ENCOUNTER — APPOINTMENT (OUTPATIENT)
Dept: PLASTIC SURGERY | Facility: CLINIC | Age: 53
End: 2023-10-02
Payer: MEDICAID

## 2023-10-02 ENCOUNTER — NON-APPOINTMENT (OUTPATIENT)
Age: 53
End: 2023-10-02

## 2023-10-02 VITALS — HEIGHT: 71.5 IN | WEIGHT: 143 LBS | BODY MASS INDEX: 19.58 KG/M2 | OXYGEN SATURATION: 97 % | HEART RATE: 100 BPM

## 2023-10-02 DIAGNOSIS — Z42.1 ENCOUNTER FOR BREAST RECONSTRUCTION FOLLOWING MASTECTOMY: ICD-10-CM

## 2023-10-02 PROCEDURE — 99024 POSTOP FOLLOW-UP VISIT: CPT

## 2023-10-02 RX ORDER — DIAZEPAM 5 MG/1
5 TABLET ORAL
Qty: 15 | Refills: 0 | Status: DISCONTINUED | COMMUNITY
Start: 2023-07-12 | End: 2023-10-02

## 2023-10-02 RX ORDER — DIAZEPAM 5 MG/1
5 TABLET ORAL
Qty: 15 | Refills: 0 | Status: DISCONTINUED | COMMUNITY
Start: 2023-07-27 | End: 2023-10-02

## 2023-10-02 RX ORDER — OXYCODONE AND ACETAMINOPHEN 5; 325 MG/1; MG/1
5-325 TABLET ORAL
Qty: 20 | Refills: 0 | Status: DISCONTINUED | COMMUNITY
Start: 2023-07-12 | End: 2023-10-02

## 2023-10-06 ENCOUNTER — NON-APPOINTMENT (OUTPATIENT)
Age: 53
End: 2023-10-06

## 2023-10-09 ENCOUNTER — APPOINTMENT (OUTPATIENT)
Dept: HEMATOLOGY ONCOLOGY | Facility: CLINIC | Age: 53
End: 2023-10-09
Payer: MEDICAID

## 2023-10-09 ENCOUNTER — RESULT REVIEW (OUTPATIENT)
Age: 53
End: 2023-10-09

## 2023-10-09 ENCOUNTER — NON-APPOINTMENT (OUTPATIENT)
Age: 53
End: 2023-10-09

## 2023-10-09 VITALS
BODY MASS INDEX: 20.13 KG/M2 | HEIGHT: 71.5 IN | HEART RATE: 78 BPM | RESPIRATION RATE: 16 BRPM | WEIGHT: 147 LBS | SYSTOLIC BLOOD PRESSURE: 139 MMHG | TEMPERATURE: 97.3 F | DIASTOLIC BLOOD PRESSURE: 82 MMHG

## 2023-10-09 PROCEDURE — 99214 OFFICE O/P EST MOD 30 MIN: CPT | Mod: 25

## 2023-10-10 ENCOUNTER — OUTPATIENT (OUTPATIENT)
Dept: OUTPATIENT SERVICES | Facility: HOSPITAL | Age: 53
LOS: 1 days | End: 2023-10-10
Payer: COMMERCIAL

## 2023-10-10 ENCOUNTER — APPOINTMENT (OUTPATIENT)
Dept: INFUSION THERAPY | Facility: CLINIC | Age: 53
End: 2023-10-10

## 2023-10-10 VITALS
SYSTOLIC BLOOD PRESSURE: 114 MMHG | RESPIRATION RATE: 18 BRPM | WEIGHT: 134.92 LBS | OXYGEN SATURATION: 98 % | DIASTOLIC BLOOD PRESSURE: 70 MMHG | HEART RATE: 64 BPM | HEIGHT: 71 IN | TEMPERATURE: 98 F

## 2023-10-10 DIAGNOSIS — C50.912 MALIGNANT NEOPLASM OF UNSPECIFIED SITE OF LEFT FEMALE BREAST: ICD-10-CM

## 2023-10-10 DIAGNOSIS — Z98.890 OTHER SPECIFIED POSTPROCEDURAL STATES: Chronic | ICD-10-CM

## 2023-10-10 DIAGNOSIS — Z90.13 ACQUIRED ABSENCE OF BILATERAL BREASTS AND NIPPLES: Chronic | ICD-10-CM

## 2023-10-10 PROCEDURE — 96372 THER/PROPH/DIAG INJ SC/IM: CPT

## 2023-10-10 RX ORDER — PEGFILGRASTIM-CBQV 6 MG/.6ML
6 INJECTION, SOLUTION SUBCUTANEOUS ONCE
Refills: 0 | Status: COMPLETED | OUTPATIENT
Start: 2023-10-10 | End: 2023-10-10

## 2023-10-10 RX ADMIN — PEGFILGRASTIM-CBQV 6 MILLIGRAM(S): 6 INJECTION, SOLUTION SUBCUTANEOUS at 14:23

## 2023-10-17 ENCOUNTER — NON-APPOINTMENT (OUTPATIENT)
Age: 53
End: 2023-10-17

## 2023-10-18 ENCOUNTER — NON-APPOINTMENT (OUTPATIENT)
Age: 53
End: 2023-10-18

## 2023-10-23 ENCOUNTER — RESULT REVIEW (OUTPATIENT)
Age: 53
End: 2023-10-23

## 2023-10-23 ENCOUNTER — NON-APPOINTMENT (OUTPATIENT)
Age: 53
End: 2023-10-23

## 2023-10-23 ENCOUNTER — APPOINTMENT (OUTPATIENT)
Dept: HEMATOLOGY ONCOLOGY | Facility: CLINIC | Age: 53
End: 2023-10-23
Payer: MEDICAID

## 2023-10-23 VITALS
OXYGEN SATURATION: 99 % | HEIGHT: 71.5 IN | WEIGHT: 150.31 LBS | TEMPERATURE: 97.4 F | SYSTOLIC BLOOD PRESSURE: 137 MMHG | DIASTOLIC BLOOD PRESSURE: 82 MMHG | RESPIRATION RATE: 16 BRPM | HEART RATE: 77 BPM | BODY MASS INDEX: 20.58 KG/M2

## 2023-10-23 DIAGNOSIS — F41.9 ANXIETY DISORDER, UNSPECIFIED: ICD-10-CM

## 2023-10-23 DIAGNOSIS — C50.912 MALIGNANT NEOPLASM OF UNSPECIFIED SITE OF LEFT FEMALE BREAST: ICD-10-CM

## 2023-10-23 PROCEDURE — 99214 OFFICE O/P EST MOD 30 MIN: CPT | Mod: 25

## 2023-10-24 ENCOUNTER — NON-APPOINTMENT (OUTPATIENT)
Age: 53
End: 2023-10-24

## 2023-10-24 ENCOUNTER — OUTPATIENT (OUTPATIENT)
Dept: OUTPATIENT SERVICES | Facility: HOSPITAL | Age: 53
LOS: 1 days | End: 2023-10-24
Payer: COMMERCIAL

## 2023-10-24 ENCOUNTER — APPOINTMENT (OUTPATIENT)
Dept: INFUSION THERAPY | Facility: CLINIC | Age: 53
End: 2023-10-24

## 2023-10-24 VITALS
RESPIRATION RATE: 18 BRPM | HEART RATE: 75 BPM | DIASTOLIC BLOOD PRESSURE: 75 MMHG | TEMPERATURE: 97 F | OXYGEN SATURATION: 97 % | SYSTOLIC BLOOD PRESSURE: 128 MMHG

## 2023-10-24 DIAGNOSIS — Z90.13 ACQUIRED ABSENCE OF BILATERAL BREASTS AND NIPPLES: Chronic | ICD-10-CM

## 2023-10-24 DIAGNOSIS — Z98.82 BREAST IMPLANT STATUS: Chronic | ICD-10-CM

## 2023-10-24 DIAGNOSIS — Z98.890 OTHER SPECIFIED POSTPROCEDURAL STATES: Chronic | ICD-10-CM

## 2023-10-24 DIAGNOSIS — N88.8 OTHER SPECIFIED NONINFLAMMATORY DISORDERS OF CERVIX UTERI: Chronic | ICD-10-CM

## 2023-10-24 DIAGNOSIS — C50.912 MALIGNANT NEOPLASM OF UNSPECIFIED SITE OF LEFT FEMALE BREAST: ICD-10-CM

## 2023-10-24 PROCEDURE — 96372 THER/PROPH/DIAG INJ SC/IM: CPT

## 2023-10-24 RX ORDER — PEGFILGRASTIM-CBQV 6 MG/.6ML
6 INJECTION, SOLUTION SUBCUTANEOUS ONCE
Refills: 0 | Status: COMPLETED | OUTPATIENT
Start: 2023-10-24 | End: 2023-10-24

## 2023-10-24 RX ADMIN — PEGFILGRASTIM-CBQV 6 MILLIGRAM(S): 6 INJECTION, SOLUTION SUBCUTANEOUS at 15:56

## 2023-10-26 ENCOUNTER — NON-APPOINTMENT (OUTPATIENT)
Age: 53
End: 2023-10-26

## 2023-10-30 ENCOUNTER — NON-APPOINTMENT (OUTPATIENT)
Age: 53
End: 2023-10-30

## 2023-11-02 ENCOUNTER — NON-APPOINTMENT (OUTPATIENT)
Age: 53
End: 2023-11-02

## 2023-11-06 ENCOUNTER — APPOINTMENT (OUTPATIENT)
Dept: HEMATOLOGY ONCOLOGY | Facility: CLINIC | Age: 53
End: 2023-11-06
Payer: MEDICAID

## 2023-11-06 ENCOUNTER — RESULT REVIEW (OUTPATIENT)
Age: 53
End: 2023-11-06

## 2023-11-06 ENCOUNTER — NON-APPOINTMENT (OUTPATIENT)
Age: 53
End: 2023-11-06

## 2023-11-06 VITALS
TEMPERATURE: 97.3 F | HEIGHT: 71.5 IN | WEIGHT: 146.31 LBS | RESPIRATION RATE: 16 BRPM | BODY MASS INDEX: 20.04 KG/M2 | OXYGEN SATURATION: 99 % | DIASTOLIC BLOOD PRESSURE: 74 MMHG | HEART RATE: 71 BPM | SYSTOLIC BLOOD PRESSURE: 134 MMHG

## 2023-11-06 PROCEDURE — 99214 OFFICE O/P EST MOD 30 MIN: CPT

## 2023-11-07 ENCOUNTER — NON-APPOINTMENT (OUTPATIENT)
Age: 53
End: 2023-11-07

## 2023-11-07 ENCOUNTER — APPOINTMENT (OUTPATIENT)
Dept: INFUSION THERAPY | Facility: CLINIC | Age: 53
End: 2023-11-07

## 2023-11-07 ENCOUNTER — OUTPATIENT (OUTPATIENT)
Dept: OUTPATIENT SERVICES | Facility: HOSPITAL | Age: 53
LOS: 1 days | End: 2023-11-07
Payer: COMMERCIAL

## 2023-11-07 VITALS
HEART RATE: 69 BPM | DIASTOLIC BLOOD PRESSURE: 73 MMHG | RESPIRATION RATE: 18 BRPM | OXYGEN SATURATION: 98 % | SYSTOLIC BLOOD PRESSURE: 116 MMHG | TEMPERATURE: 97 F | WEIGHT: 134.92 LBS | HEIGHT: 71 IN

## 2023-11-07 DIAGNOSIS — C50.912 MALIGNANT NEOPLASM OF UNSPECIFIED SITE OF LEFT FEMALE BREAST: ICD-10-CM

## 2023-11-07 DIAGNOSIS — Z98.82 BREAST IMPLANT STATUS: Chronic | ICD-10-CM

## 2023-11-07 DIAGNOSIS — Z90.13 ACQUIRED ABSENCE OF BILATERAL BREASTS AND NIPPLES: Chronic | ICD-10-CM

## 2023-11-07 DIAGNOSIS — N88.8 OTHER SPECIFIED NONINFLAMMATORY DISORDERS OF CERVIX UTERI: Chronic | ICD-10-CM

## 2023-11-07 DIAGNOSIS — Z98.890 OTHER SPECIFIED POSTPROCEDURAL STATES: Chronic | ICD-10-CM

## 2023-11-07 PROCEDURE — 96372 THER/PROPH/DIAG INJ SC/IM: CPT

## 2023-11-07 RX ORDER — PEGFILGRASTIM-CBQV 6 MG/.6ML
6 INJECTION, SOLUTION SUBCUTANEOUS ONCE
Refills: 0 | Status: COMPLETED | OUTPATIENT
Start: 2023-11-07 | End: 2023-11-07

## 2023-11-07 RX ADMIN — PEGFILGRASTIM-CBQV 6 MILLIGRAM(S): 6 INJECTION, SOLUTION SUBCUTANEOUS at 12:46

## 2023-11-08 ENCOUNTER — NON-APPOINTMENT (OUTPATIENT)
Age: 53
End: 2023-11-08

## 2023-11-13 ENCOUNTER — APPOINTMENT (OUTPATIENT)
Dept: HEMATOLOGY ONCOLOGY | Facility: CLINIC | Age: 53
End: 2023-11-13

## 2023-11-19 ENCOUNTER — NON-APPOINTMENT (OUTPATIENT)
Age: 53
End: 2023-11-19

## 2023-11-20 ENCOUNTER — APPOINTMENT (OUTPATIENT)
Dept: HEMATOLOGY ONCOLOGY | Facility: CLINIC | Age: 53
End: 2023-11-20

## 2023-11-20 ENCOUNTER — NON-APPOINTMENT (OUTPATIENT)
Age: 53
End: 2023-11-20

## 2023-11-20 ENCOUNTER — APPOINTMENT (OUTPATIENT)
Dept: HEMATOLOGY ONCOLOGY | Facility: CLINIC | Age: 53
End: 2023-11-20
Payer: MEDICAID

## 2023-11-20 ENCOUNTER — RESULT REVIEW (OUTPATIENT)
Age: 53
End: 2023-11-20

## 2023-11-20 VITALS
HEART RATE: 79 BPM | WEIGHT: 150.1 LBS | TEMPERATURE: 97.3 F | DIASTOLIC BLOOD PRESSURE: 80 MMHG | HEIGHT: 71.5 IN | BODY MASS INDEX: 20.55 KG/M2 | SYSTOLIC BLOOD PRESSURE: 132 MMHG | OXYGEN SATURATION: 97 % | RESPIRATION RATE: 16 BRPM

## 2023-11-20 DIAGNOSIS — C50.919 MALIGNANT NEOPLASM OF UNSPECIFIED SITE OF UNSPECIFIED FEMALE BREAST: ICD-10-CM

## 2023-11-20 PROCEDURE — 99214 OFFICE O/P EST MOD 30 MIN: CPT | Mod: 25

## 2023-11-21 ENCOUNTER — OUTPATIENT (OUTPATIENT)
Dept: OUTPATIENT SERVICES | Facility: HOSPITAL | Age: 53
LOS: 1 days | End: 2023-11-21
Payer: COMMERCIAL

## 2023-11-21 ENCOUNTER — APPOINTMENT (OUTPATIENT)
Dept: INFUSION THERAPY | Facility: CLINIC | Age: 53
End: 2023-11-21

## 2023-11-21 VITALS
HEART RATE: 71 BPM | SYSTOLIC BLOOD PRESSURE: 123 MMHG | DIASTOLIC BLOOD PRESSURE: 83 MMHG | OXYGEN SATURATION: 100 % | TEMPERATURE: 98 F | RESPIRATION RATE: 18 BRPM

## 2023-11-21 DIAGNOSIS — C50.912 MALIGNANT NEOPLASM OF UNSPECIFIED SITE OF LEFT FEMALE BREAST: ICD-10-CM

## 2023-11-21 DIAGNOSIS — Z98.890 OTHER SPECIFIED POSTPROCEDURAL STATES: Chronic | ICD-10-CM

## 2023-11-21 DIAGNOSIS — Z90.13 ACQUIRED ABSENCE OF BILATERAL BREASTS AND NIPPLES: Chronic | ICD-10-CM

## 2023-11-21 DIAGNOSIS — Z98.82 BREAST IMPLANT STATUS: Chronic | ICD-10-CM

## 2023-11-21 DIAGNOSIS — N88.8 OTHER SPECIFIED NONINFLAMMATORY DISORDERS OF CERVIX UTERI: Chronic | ICD-10-CM

## 2023-11-21 PROCEDURE — 96372 THER/PROPH/DIAG INJ SC/IM: CPT

## 2023-11-21 RX ORDER — PEGFILGRASTIM-CBQV 6 MG/.6ML
6 INJECTION, SOLUTION SUBCUTANEOUS ONCE
Refills: 0 | Status: COMPLETED | OUTPATIENT
Start: 2023-11-21 | End: 2023-11-21

## 2023-11-21 RX ADMIN — PEGFILGRASTIM-CBQV 6 MILLIGRAM(S): 6 INJECTION, SOLUTION SUBCUTANEOUS at 12:05

## 2023-11-27 ENCOUNTER — NON-APPOINTMENT (OUTPATIENT)
Age: 53
End: 2023-11-27

## 2023-11-29 ENCOUNTER — NON-APPOINTMENT (OUTPATIENT)
Age: 53
End: 2023-11-29

## 2023-12-04 ENCOUNTER — RESULT REVIEW (OUTPATIENT)
Age: 53
End: 2023-12-04

## 2023-12-04 ENCOUNTER — APPOINTMENT (OUTPATIENT)
Dept: HEMATOLOGY ONCOLOGY | Facility: CLINIC | Age: 53
End: 2023-12-04

## 2023-12-04 ENCOUNTER — APPOINTMENT (OUTPATIENT)
Dept: HEMATOLOGY ONCOLOGY | Facility: CLINIC | Age: 53
End: 2023-12-04
Payer: MEDICAID

## 2023-12-04 ENCOUNTER — NON-APPOINTMENT (OUTPATIENT)
Age: 53
End: 2023-12-04

## 2023-12-04 VITALS
SYSTOLIC BLOOD PRESSURE: 133 MMHG | TEMPERATURE: 97 F | HEART RATE: 76 BPM | HEIGHT: 71.5 IN | RESPIRATION RATE: 16 BRPM | WEIGHT: 151.8 LBS | DIASTOLIC BLOOD PRESSURE: 74 MMHG | OXYGEN SATURATION: 97 % | BODY MASS INDEX: 20.79 KG/M2

## 2023-12-04 DIAGNOSIS — D50.0 IRON DEFICIENCY ANEMIA SECONDARY TO BLOOD LOSS (CHRONIC): ICD-10-CM

## 2023-12-04 DIAGNOSIS — R45.89 OTHER SYMPTOMS AND SIGNS INVOLVING EMOTIONAL STATE: ICD-10-CM

## 2023-12-04 PROCEDURE — 99214 OFFICE O/P EST MOD 30 MIN: CPT | Mod: 25

## 2023-12-05 ENCOUNTER — OUTPATIENT (OUTPATIENT)
Dept: OUTPATIENT SERVICES | Facility: HOSPITAL | Age: 53
LOS: 1 days | End: 2023-12-05
Payer: COMMERCIAL

## 2023-12-05 ENCOUNTER — NON-APPOINTMENT (OUTPATIENT)
Age: 53
End: 2023-12-05

## 2023-12-05 ENCOUNTER — APPOINTMENT (OUTPATIENT)
Dept: INFUSION THERAPY | Facility: CLINIC | Age: 53
End: 2023-12-05

## 2023-12-05 VITALS
HEART RATE: 75 BPM | RESPIRATION RATE: 18 BRPM | OXYGEN SATURATION: 98 % | DIASTOLIC BLOOD PRESSURE: 72 MMHG | TEMPERATURE: 99 F | SYSTOLIC BLOOD PRESSURE: 112 MMHG

## 2023-12-05 DIAGNOSIS — M10.00 IDIOPATHIC GOUT, UNSPECIFIED SITE: ICD-10-CM

## 2023-12-05 DIAGNOSIS — Z98.890 OTHER SPECIFIED POSTPROCEDURAL STATES: Chronic | ICD-10-CM

## 2023-12-05 DIAGNOSIS — Z90.13 ACQUIRED ABSENCE OF BILATERAL BREASTS AND NIPPLES: Chronic | ICD-10-CM

## 2023-12-05 DIAGNOSIS — N88.8 OTHER SPECIFIED NONINFLAMMATORY DISORDERS OF CERVIX UTERI: Chronic | ICD-10-CM

## 2023-12-05 DIAGNOSIS — Z98.82 BREAST IMPLANT STATUS: Chronic | ICD-10-CM

## 2023-12-05 PROCEDURE — 96372 THER/PROPH/DIAG INJ SC/IM: CPT

## 2023-12-05 RX ORDER — PEGFILGRASTIM-CBQV 6 MG/.6ML
6 INJECTION, SOLUTION SUBCUTANEOUS ONCE
Refills: 0 | Status: COMPLETED | OUTPATIENT
Start: 2023-12-05 | End: 2023-12-05

## 2023-12-05 RX ADMIN — PEGFILGRASTIM-CBQV 6 MILLIGRAM(S): 6 INJECTION, SOLUTION SUBCUTANEOUS at 11:47

## 2023-12-05 NOTE — DISCHARGE INSTRUCTIONS: GENERAL THERAPY - NSAPPTSFOLLOWUP_HEME_A_AMB
[facility]... [facility]...

## 2023-12-05 NOTE — DISCHARGE INSTRUCTIONS: GENERAL THERAPY - NSFACILITYCONTAFTRHOUR_HEME_A_AMB
[facility] ([phone]) [facility] ([phone])

## 2023-12-11 ENCOUNTER — APPOINTMENT (OUTPATIENT)
Dept: HEMATOLOGY ONCOLOGY | Facility: CLINIC | Age: 53
End: 2023-12-11

## 2023-12-12 ENCOUNTER — NON-APPOINTMENT (OUTPATIENT)
Age: 53
End: 2023-12-12

## 2023-12-14 ENCOUNTER — NON-APPOINTMENT (OUTPATIENT)
Age: 53
End: 2023-12-14

## 2023-12-18 ENCOUNTER — RESULT REVIEW (OUTPATIENT)
Age: 53
End: 2023-12-18

## 2023-12-18 ENCOUNTER — NON-APPOINTMENT (OUTPATIENT)
Age: 53
End: 2023-12-18

## 2023-12-18 ENCOUNTER — APPOINTMENT (OUTPATIENT)
Dept: HEMATOLOGY ONCOLOGY | Facility: CLINIC | Age: 53
End: 2023-12-18
Payer: MEDICAID

## 2023-12-18 VITALS
BODY MASS INDEX: 21.08 KG/M2 | SYSTOLIC BLOOD PRESSURE: 123 MMHG | RESPIRATION RATE: 16 BRPM | HEIGHT: 71.5 IN | TEMPERATURE: 97.2 F | HEART RATE: 90 BPM | WEIGHT: 153.9 LBS | DIASTOLIC BLOOD PRESSURE: 71 MMHG | OXYGEN SATURATION: 95 %

## 2023-12-18 DIAGNOSIS — D63.0 MALIGNANT (PRIMARY) NEOPLASM, UNSPECIFIED: ICD-10-CM

## 2023-12-18 DIAGNOSIS — C80.1 MALIGNANT (PRIMARY) NEOPLASM, UNSPECIFIED: ICD-10-CM

## 2023-12-18 PROCEDURE — 99214 OFFICE O/P EST MOD 30 MIN: CPT | Mod: 25
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
## 2023-12-18 NOTE — ASSESSMENT
[FreeTextEntry1] : Carcinoma of left breast metastatic to axillary lymph node (174.9,196.3) (C50.912,C77.3) Anemia, blood loss (280.0) (D50.0) Carcinoma of overlapping sites of left breast in female, estrogen receptor positive (174.8,V86.0) (C50.812,Z17.0) Anxiety (300.00) (F41.9) HER2-negative carcinoma of left breast (174.9) (C50.912) Invasive carcinoma of breast (174.9) (C50.919) 54 yo premenopausal Polish woman ( fluent in English and Polish) referred by Dr. Elise for evaluation of left breast cancer. Biopsy revealed IDC ESTROGEN RECEPTOR: POSITIVE 45% NUCLEAR STAINING WITH WEAK/MODERATE 1+/2+ INTENSITY PROGESTERONE RECEPTOR: POSITIVE 30% NUCLEAR STAINING WITH MODERATE 2+ INTENSITY HER-2: NEGATIVE (0 MEMBRANOUS STAINING) KI 67 35 %.  Imaging studies with left breast mass in US 3.9 x 1.1 x 1.2 cm, MRI max dimension 5.3 cm. Right breast lesion - biopsy unsuccessful.  Patient decided on bilateral mastectomy. She understand indication for systemic chemotherapy based on lymph node status and molecular testing. 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 Reviewed protocols of chemotherapy including ACdd> Tweekly, TC and CMF with the last one with lowest incidence of hair loss. Mammaprint results c/w low recurrence score, reviewed with patient Oncotype Dx 13, distant risk of recurrence 13%, with no benefit from chemotherapy Patient underwent left sided mastectomy - pathology report reviewed, IDC 4 cm, T2, N1, with LVI. 2/22  Patient underwent right sided mastectomy and b/l placement of the expanders, her postop course was complicated by bleeding from right side.  She feels depressed and anxious - had 4 surgeries in the last 6 weeks, 1st one with anaphylactic reaction to MB, left breast mastectomy, followed by right breast mastectomy, complicated by bleeding.   Zoladex since 3/23/2022 Right breast mastectomy - pathology reviewed 3/23/2022- papilloma and ADH Continue ovarian suppression Zoladex 3/2022 > Lupron 3.75 mg q 28 days> Lupron 11.25 mg 3/2/2023 Start Anastrozole 1 mg PO daily- hold during radiation. S/p radiation July 13, 2022 Tx - Lupron 3.75 mg Q 4 weeks, 6/1/23 Change to Q 3 m 11.25 mg on 12/18/23 Patient underwent bilateral Maddy reconstruction on 7/20/2023 and in the tissue removed during the procedure she was found to have left internal mammary lymph node positive for micrometastatic carcinoma 1.5 mm with extranodal extension, with molecular testing indicating triple negative breast cancer. PET scan performed on August 21, 2023 showed mildly metabolic avid subcentimeter lymph nodes in the right axilla and left internal mammary chain with SUV ranging from 1.2-1.5. I discussed with patient above findings and the plan. Obtain biopsy of the right axillary lymph node -discussed with GLENROY Olson and echo in preparation for chemotherapy. Sent email to rad onc to see if patient might have additional radiation therapy. Chemotherapy offered AC dose dense followed by paclitaxel dose dense. Patient desires cold capping as her livelihood depends on her. She has PTSD, requires cold capping. Patient requested social work support.  AC dd q 2 weeks Adriamycin 60 mg /m2 = 108 mg over 30 min Cytoxan 600 mg /m2 = 1080 mg over 30 min Neulasta onpro 6 mg SQ x 8 q 2 weeks Emend 150 mg IVSS Aloxi 0.25 IVP Dexamethasone 12 mg IVSS  followed by  Taxol 175 mg/m2 = 316 mg over 3H Benadryl 50 mg IV Famotidine 20mg IV Dexamethasone 12 mg IVSS Neulasta onpro 6 mg SQ x 8 q 2 weeks  Patient will have fulifila injection at [facility].  #AC # 2- tolerated cycle 1 well, very fatigued 72 hrs post tx, and nausous - resolved. Apetite good.  #AC #3- fatigued, denies N/V. Increased urination- check UA today  #AC # 4- fatigued, weight increase, has been consuming a lot of fruits and fruit juice- To begin 3 hr Taxol in 2 weeks- steroids sent in.    # Paclitaxel @ 2 weeks DD - patient with cold capping and issues with transportation.  Reviewed with patient side effects and schema of chemotherapy   11/20/23- Taxol # 2- fatigued, denies neuropathy  12/4/23- Taxol # 3 today- still has cough post COVID  12/18/23- Taxol # 4 today- fatigued- some hair shedding despite dignicap  # Anemia - blood loss-, start PO iron , ferrous gluconate PO daily in am. Hg 11.8  # Depressed mood-Lexapro- 10 mg. She has limited social support, lives by herself, father and mother ( dementia) in [place], referred to , would benefit from psychosocial support. She is very hesitant about chemotherapy since loosing her her will affect her livelihood. Oncotype Dx 13, distant risk of recurrence 13%, with no benefit from chemotherapy.  # Osteopenia - last bone density March 2023 T-score- 2.4 Risk factors Recommended: 1. Vitamin D 2. Calcium supplement 500mg 3. Weight bearing exercises - dental exam - agustina call after dental exam - consider Prolia or Zometa  # Needs bunion sx - to d/w Dr. Barry which should take place first. Dexa ordered Labs next visit - CBC, CMP,  case and mgmt discussed with Dr. Marlow- return in 4  weeks reg labs and breast tumor markers

## 2023-12-19 ENCOUNTER — OUTPATIENT (OUTPATIENT)
Dept: OUTPATIENT SERVICES | Facility: HOSPITAL | Age: 53
LOS: 1 days | End: 2023-12-19
Payer: COMMERCIAL

## 2023-12-19 ENCOUNTER — APPOINTMENT (OUTPATIENT)
Dept: INFUSION THERAPY | Facility: CLINIC | Age: 53
End: 2023-12-19

## 2023-12-19 VITALS
HEART RATE: 76 BPM | HEIGHT: 71 IN | SYSTOLIC BLOOD PRESSURE: 95 MMHG | OXYGEN SATURATION: 98 % | TEMPERATURE: 97 F | RESPIRATION RATE: 18 BRPM | DIASTOLIC BLOOD PRESSURE: 63 MMHG | WEIGHT: 153 LBS

## 2023-12-19 DIAGNOSIS — N88.8 OTHER SPECIFIED NONINFLAMMATORY DISORDERS OF CERVIX UTERI: Chronic | ICD-10-CM

## 2023-12-19 DIAGNOSIS — C50.912 MALIGNANT NEOPLASM OF UNSPECIFIED SITE OF LEFT FEMALE BREAST: ICD-10-CM

## 2023-12-19 DIAGNOSIS — Z90.13 ACQUIRED ABSENCE OF BILATERAL BREASTS AND NIPPLES: Chronic | ICD-10-CM

## 2023-12-19 DIAGNOSIS — Z98.82 BREAST IMPLANT STATUS: Chronic | ICD-10-CM

## 2023-12-19 DIAGNOSIS — Z98.890 OTHER SPECIFIED POSTPROCEDURAL STATES: Chronic | ICD-10-CM

## 2023-12-19 PROCEDURE — 96372 THER/PROPH/DIAG INJ SC/IM: CPT

## 2023-12-19 RX ORDER — PEGFILGRASTIM-CBQV 6 MG/.6ML
6 INJECTION, SOLUTION SUBCUTANEOUS ONCE
Refills: 0 | Status: COMPLETED | OUTPATIENT
Start: 2023-12-19 | End: 2023-12-19

## 2023-12-19 RX ADMIN — PEGFILGRASTIM-CBQV 6 MILLIGRAM(S): 6 INJECTION, SOLUTION SUBCUTANEOUS at 15:03

## 2024-01-02 ENCOUNTER — APPOINTMENT (OUTPATIENT)
Dept: HEMATOLOGY ONCOLOGY | Facility: CLINIC | Age: 54
End: 2024-01-02

## 2024-01-08 ENCOUNTER — APPOINTMENT (OUTPATIENT)
Dept: HEMATOLOGY ONCOLOGY | Facility: CLINIC | Age: 54
End: 2024-01-08

## 2024-01-11 ENCOUNTER — NON-APPOINTMENT (OUTPATIENT)
Age: 54
End: 2024-01-11

## 2024-01-16 ENCOUNTER — APPOINTMENT (OUTPATIENT)
Dept: HEMATOLOGY ONCOLOGY | Facility: CLINIC | Age: 54
End: 2024-01-16

## 2024-01-17 ENCOUNTER — NON-APPOINTMENT (OUTPATIENT)
Age: 54
End: 2024-01-17

## 2024-01-18 ENCOUNTER — RESULT REVIEW (OUTPATIENT)
Age: 54
End: 2024-01-18

## 2024-01-18 ENCOUNTER — APPOINTMENT (OUTPATIENT)
Dept: HEMATOLOGY ONCOLOGY | Facility: CLINIC | Age: 54
End: 2024-01-18
Payer: MEDICAID

## 2024-01-18 VITALS
HEIGHT: 71.5 IN | TEMPERATURE: 97.2 F | BODY MASS INDEX: 20.56 KG/M2 | OXYGEN SATURATION: 99 % | WEIGHT: 150.13 LBS | RESPIRATION RATE: 16 BRPM | HEART RATE: 68 BPM | SYSTOLIC BLOOD PRESSURE: 130 MMHG | DIASTOLIC BLOOD PRESSURE: 83 MMHG

## 2024-01-18 PROCEDURE — 99214 OFFICE O/P EST MOD 30 MIN: CPT

## 2024-01-18 RX ORDER — ESCITALOPRAM OXALATE 20 MG/1
20 TABLET ORAL DAILY
Qty: 30 | Refills: 6 | Status: ACTIVE | COMMUNITY
Start: 2022-03-23 | End: 1900-01-01

## 2024-01-18 RX ORDER — OLANZAPINE 2.5 MG/1
2.5 TABLET, FILM COATED ORAL
Qty: 30 | Refills: 2 | Status: COMPLETED | COMMUNITY
Start: 2023-09-05 | End: 2024-01-18

## 2024-01-18 RX ORDER — ONDANSETRON 4 MG/1
4 TABLET, ORALLY DISINTEGRATING ORAL
Qty: 20 | Refills: 6 | Status: COMPLETED | COMMUNITY
Start: 2023-09-05 | End: 2024-01-18

## 2024-01-18 RX ORDER — DEXAMETHASONE 4 MG/1
4 TABLET ORAL
Qty: 60 | Refills: 2 | Status: COMPLETED | COMMUNITY
Start: 2023-10-23 | End: 2024-01-18

## 2024-01-18 NOTE — PHYSICAL EXAM
[Fully active, able to carry on all pre-disease performance without restriction] : Status 0 - Fully active, able to carry on all pre-disease performance without restriction [Normal] : affect appropriate [de-identified] : adriel FLAP
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
## 2024-01-18 NOTE — ASSESSMENT
[Curative] : Goals of care discussed with patient: Curative [Palliative Care Plan] : not applicable at this time [Patient/Caregiver not ready to engage] : Patient/Caregiver not ready to engage [FreeTextEntry1] : 52 yo premenopausal Polish woman ( fluent in English and Polish) referred by Dr. Elise for evaluation of left breast cancer. Biopsy revealed IDC ESTROGEN RECEPTOR: POSITIVE 45% NUCLEAR STAINING WITH WEAK/MODERATE 1+/2+ INTENSITY PROGESTERONE RECEPTOR: POSITIVE 30% NUCLEAR STAINING WITH MODERATE 2+ INTENSITY HER-2: NEGATIVE (0 MEMBRANOUS STAINING) KI 67 35 %.  Imaging studies with left breast mass in US 3.9 x 1.1 x 1.2 cm, MRI max dimension 5.3 cm. Right breast lesion - biopsy unsuccessful.  Patient decided on bilateral mastectomy. She understand indication for systemic chemotherapy based on lymph node status and molecular testing. 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 Reviewed protocols of chemotherapy including ACdd> Tweekly, TC and CMF with the last one with lowest incidence of hair loss. Mammaprint results c/w low recurrence score, reviewed with patient Oncotype Dx 13, distant risk of recurrence 13%, with no benefit from chemotherapy Patient underwent left sided mastectomy - pathology report reviewed, IDC 4 cm, T2, N1, with LVI. 2/22  Patient underwent right sided mastectomy and b/l placement of the expanders, her postop course was complicated by bleeding from right side.  She feels depressed and anxious - had 4 surgeries in the last 6 weeks, 1st one with anaphylactic reaction to MB, left breast mastectomy, followed by right breast mastectomy, complicated by bleeding.   Zoladex since 3/23/2022 Right breast mastectomy - pathology reviewed 3/23/2022- papilloma and ADH Continue ovarian suppression Zoladex 3/2022 > Lupron 3.75 mg q 28 days> Lupron 11.25 mg 3/2/2023, 6/23, 12/23. Start Anastrozole 1 mg PO daily- hold during radiation. S/p radiation July 13, 2022 Tx - Lupron 3.75 mg Q 4 weeks, 6/1/23 Change to Q 3 m 11.25 mg on 12/18/23 Patient underwent bilateral Maddy reconstruction on 7/20/2023 and in the tissue removed during the procedure she was found to have left internal mammary lymph node positive for micrometastatic carcinoma 1.5 mm with extranodal extension, with molecular testing indicating triple negative breast cancer. PET scan performed on August 21, 2023 showed mildly metabolic avid subcentimeter lymph nodes in the right axilla and left internal mammary chain with SUV ranging from 1.2-1.5. I discussed with patient above findings and the plan. Obtain biopsy of the right axillary lymph node -discussed with GLENROY Olson and echo in preparation for chemotherapy. Sent email to rad onc to see if patient might have additional radiation therapy. Chemotherapy offered AC dose dense followed by paclitaxel dose dense. Patient desires cold capping as her livelihood depends on her. She has PTSD, requires cold capping. Patient requested social work support.  AC dd q 2 weeks followed by Taxol Q 2 weeks x 4- completed 12/18/23 - referred rad onc Dr. Estrada to evaluate if there is a role for additional RT - post tx PETCT   # Anemia - blood loss-, start PO iron, ferrous gluconate PO daily in am. Hg 11.8  # Hyperglycemia - check HgA1c - off steroids - dietitian evaluation  # Depressed mood-Lexapro- 10 mg. Increase to 20 mg  She has limited social support, lives by herself, father and mother ( dementia) in [place], referred to , would benefit from psychosocial support.   # Sinus pressure -ENT referral - rhinorrhea post chemo  # Osteopenia - last bone density March 2023 T-score- 2.4 Risk factors Recommended: 1. Vitamin D 2. Calcium supplement 500mg 3. 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 Barry which should take place first. Labs next visit - CBC, CMP,   return in 12 weeks reg labs and breast tumor markers

## 2024-01-19 ENCOUNTER — NON-APPOINTMENT (OUTPATIENT)
Age: 54
End: 2024-01-19

## 2024-01-21 NOTE — SURGICAL HISTORY
Decision-Making Capacity         Background: Sherman Leigh is a 74 year old male  with a past medical history of alcohol abuse, alcoholic cirrhosis of the liver, HFpEF, hypertension, colon cancer s/p partial colectomy who was initially admitted to the hospital on 1/18/24 for generalized weakness. He was given a 30cc/kg IVF bolus and started azithromycin and cefepime for suspected pneumonia. On 1/20/24, an RRT was called for hypotension. ICU was asked to evaluate the patient. At the time of evaluation, he was receiving an IVF fluid bolus. He was lethargic but oriented x4. He denied any chest pain, shortness of breath, abdominal pain, nausea, numbness, vision changes or tingling. He was transferred to critical care and given a 25% albumin with little improvement in his blood pressure and was ultimately started on levophed and vasopressin drip.The patient then became confused.     Assessment:   Insight: The patient demonstrates an appreciation of the consequences and potential impact of their situation/condition(s): Inadequate  Understanding: The patient demonstrates understanding of the relevant information and the risks/burdens/benefits of proposed treatment/intervention(s): Inadequate  Reasoning: The patient demonstrates the ability to compare treatment options/alternatives and provide rationale(s) for their choice(s): Inadequate  Communicating a choice: The patient is able to consistently communicate their choice(s): Inadequate        The patient is not capable of making any medical decisions (incapacitated).     Expected duration of status: Unknown    Substitute Decision Maker      MALCOLM Markham (significant other) [phone]            [de-identified] : 01/13/22: bilateral tissue expander breast reconstruction [de-identified] : 02/08/22: Mastectomy [de-identified] : 03/10/22: Bilateral T/E Placement [de-identified] : 03/11/22: Right T/E Removal [de-identified] : 10/14/22: delayed right breast tissue expander placement  [de-identified] : 11/17/22: Infected tissue expander removal [de-identified] : 04/07/23: delayed right breast tissue expander placement

## 2024-01-22 ENCOUNTER — APPOINTMENT (OUTPATIENT)
Dept: PLASTIC SURGERY | Facility: CLINIC | Age: 54
End: 2024-01-22
Payer: MEDICAID

## 2024-01-22 ENCOUNTER — NON-APPOINTMENT (OUTPATIENT)
Age: 54
End: 2024-01-22

## 2024-01-22 PROCEDURE — 99213 OFFICE O/P EST LOW 20 MIN: CPT

## 2024-01-23 NOTE — HISTORY OF PRESENT ILLNESS
[FreeTextEntry1] : 54 y/o female presents 6 months s/p delayed b/l breast reconstruction with NICOLETTE flaps on 07/20/23. Denies any f/c/n/v. She started chemotherapy on 09/11/2023 and finished 12/18/2023. She does not know if she need radiation, she will need a PET scan in the near future.  PE: breasts- well healed, no collections or s/sx of infection, bilateral flaps warm, viable abdomen - well healed Will determine readiness for second stage pending XRT -  breast fat grafting and skin island removal RTC in 6 months post XRT, or will schedule second stage revision if XRT not needed

## 2024-01-23 NOTE — SURGICAL HISTORY
[de-identified] : 03/10/22: Bilateral T/E Placement [de-identified] : 01/13/22: bilateral tissue expander breast reconstruction [de-identified] : 02/08/22: Mastectomy [de-identified] : 11/17/22: Infected tissue expander removal [de-identified] : 10/14/22: delayed right breast tissue expander placement  [de-identified] : 03/11/22: Right T/E Removal [de-identified] : 07/20/23: bilateral stacked NICOLETTE [de-identified] : 04/07/23: delayed right breast tissue expander placement

## 2024-01-30 ENCOUNTER — NON-APPOINTMENT (OUTPATIENT)
Age: 54
End: 2024-01-30

## 2024-01-31 ENCOUNTER — NON-APPOINTMENT (OUTPATIENT)
Age: 54
End: 2024-01-31

## 2024-02-05 ENCOUNTER — NON-APPOINTMENT (OUTPATIENT)
Age: 54
End: 2024-02-05

## 2024-02-06 NOTE — PHYSICAL THERAPY INITIAL EVALUATION ADULT - DIAGNOSIS, PT EVAL
Name band;
4I: Impaired Joint Mobility, Motor Function, Muscle Performance, and Range of Motion Associated with Bony or Soft Tissue Surgery

## 2024-02-08 NOTE — ASU PATIENT PROFILE, ADULT - NSCAFFEINETYPE_GEN_ALL_CORE_SD
Pt presents for lab work. Port flushed with blood return noted. Blood work drawn and sent to lab. Saline locked and de accessed. Pt aware of future appt on 2/9/2024 at 8 am. AVS declined.   coffee Hydroxychloroquine Pregnancy And Lactation Text: This medication has been shown to cause fetal harm but it isn't assigned a Pregnancy Risk Category. There are small amounts excreted in breast milk.

## 2024-02-22 ENCOUNTER — APPOINTMENT (OUTPATIENT)
Dept: NUCLEAR MEDICINE | Facility: HOSPITAL | Age: 54
End: 2024-02-22

## 2024-02-22 ENCOUNTER — OUTPATIENT (OUTPATIENT)
Dept: OUTPATIENT SERVICES | Facility: HOSPITAL | Age: 54
LOS: 1 days | End: 2024-02-22
Payer: COMMERCIAL

## 2024-02-22 DIAGNOSIS — N88.8 OTHER SPECIFIED NONINFLAMMATORY DISORDERS OF CERVIX UTERI: Chronic | ICD-10-CM

## 2024-02-22 DIAGNOSIS — Z98.890 OTHER SPECIFIED POSTPROCEDURAL STATES: Chronic | ICD-10-CM

## 2024-02-22 DIAGNOSIS — Z90.13 ACQUIRED ABSENCE OF BILATERAL BREASTS AND NIPPLES: Chronic | ICD-10-CM

## 2024-02-22 DIAGNOSIS — Z98.82 BREAST IMPLANT STATUS: Chronic | ICD-10-CM

## 2024-02-22 LAB — GLUCOSE BLDC GLUCOMTR-MCNC: 86 MG/DL — SIGNIFICANT CHANGE UP (ref 70–99)

## 2024-02-22 PROCEDURE — 82962 GLUCOSE BLOOD TEST: CPT

## 2024-02-22 PROCEDURE — 78815 PET IMAGE W/CT SKULL-THIGH: CPT

## 2024-02-22 PROCEDURE — A9552: CPT

## 2024-02-22 PROCEDURE — 78815 PET IMAGE W/CT SKULL-THIGH: CPT | Mod: 26,PS

## 2024-02-23 ENCOUNTER — NON-APPOINTMENT (OUTPATIENT)
Age: 54
End: 2024-02-23

## 2024-02-28 ENCOUNTER — NON-APPOINTMENT (OUTPATIENT)
Age: 54
End: 2024-02-28

## 2024-02-29 ENCOUNTER — APPOINTMENT (OUTPATIENT)
Dept: RADIATION ONCOLOGY | Facility: CLINIC | Age: 54
End: 2024-02-29
Payer: MEDICAID

## 2024-02-29 VITALS
SYSTOLIC BLOOD PRESSURE: 139 MMHG | HEART RATE: 71 BPM | OXYGEN SATURATION: 99 % | BODY MASS INDEX: 20.82 KG/M2 | TEMPERATURE: 97.7 F | WEIGHT: 151.4 LBS | DIASTOLIC BLOOD PRESSURE: 91 MMHG

## 2024-02-29 PROCEDURE — 99205 OFFICE O/P NEW HI 60 MIN: CPT

## 2024-02-29 NOTE — REVIEW OF SYSTEMS
[Negative] : Allergic/Immunologic [Fatigue: Grade 0] : Fatigue: Grade 0 [Breast Pain: Grade 0] : Breast Pain: Grade 0

## 2024-02-29 NOTE — REASON FOR VISIT
[Consideration for Non-Curative Therapy] : consideration for non-curative therapy for [Breast Cancer] : breast cancer [Consideration of Curative Therapy] : consideration of curative therapy for

## 2024-03-08 ENCOUNTER — NON-APPOINTMENT (OUTPATIENT)
Age: 54
End: 2024-03-08

## 2024-03-11 RX ORDER — ANASTROZOLE TABLETS 1 MG/1
1 TABLET ORAL DAILY
Qty: 90 | Refills: 3 | Status: ACTIVE | COMMUNITY
Start: 2022-04-20 | End: 1900-01-01

## 2024-03-11 NOTE — HISTORY OF PRESENT ILLNESS
[FreeTextEntry1] : Teresa Bae is a 52 yo F with a prior history of AJCC 8th Ed Stage IIb ([id number]) LEFT UOQ Breast ER+/DC+/Her2 negative breast IDC s/p  2/8/22 Left  Mastectomy ALND (40 mm, intermediate grade, positive LVI, negative margin, 1 of 2  nodes positive, oncotype 13) followed by adjuvant radiation (5000cGy to Left chest wall and nodes completed 7/13/2022 ), now with ER-/DC-/HER2-  Left breast micro metastatic disease consistent with mammary ductal origin noted on 7/20/23 bilateral stacked NICOLETTE flap with adjuvant dose dense AC and paclitaxel  who is referred to Radiation Oncology by Dr. Mooney for discussion of radiation to the Left breast.    2/29/24: Consultation  Ms. Bae presents for discussion of adjuvant radiation. Prior h/o radiation at [facility] in 2022. No h/o PPM/ICD, or connective tissue disorder. She lives alone and is independent in ADLs. She reports some social support in the area.   History of Present Illness  ________________________________  Teresa Bae is a 50 y/o F w/ PMH b/l tissue expander breast reconstruction 1/13/22, who palpated a mass in Left breast and was found to have Left IDC s/p Left mastectomy ALND 2/8/22, w/ b/l T/E placement 3/10/22, c/c/b Right T/E removal 3/11/22, s/p radiation to Left chest wall and nodes (5000cGy completed 7/2022) and delayed Right T/E placement (11/17/22) and removal (4/7/23). She underwent NICOLETTE flap 7/20/23 with pathology revealing Left triple negative breast CA.     11/5/21 (R) b/l dx mmg and US: heterogeneously dense. Corresponding to the patient's palpable finding is a 3.9 cm mass in the left breast 1-2 o'clock which is suspicious for malignancy. This would correspond to the area of architectural distortion seen on mammography. Recommend ultrasound-guided core biopsy and clip placement. BI-RADS 4.   11/9/21 (R/[facility] path) US bx:1. Left breast 1:00-2:00 5cm fn, core biopsy: Invasive moderately differentiated mammary carcinoma with associated calcifications - Focal mammary carcinoma in situ. IHC: ESTROGEN RECEPTOR: POSITIVE 45% NUCLEAR STAINING WITH WEAK/MODERATE 1+/2+ INTENSITY PROGESTERONE RECEPTOR: POSITIVE 30% NUCLEAR STAINING WITH MODERATE 2+ INTENSITY HER-2: NEGATIVE (0 MEMBRANOUS STAINING) KI 67 35 %   11/23/21 (R) MRI: 1. MR guided core biopsy or clip placement for the right 10:00 axis mass enhancement. However due to the proximity to the silicone implant, this may technically not be feasible. 2. Known left breast cancer 5.3cm in max dimension. 3. Targeted left axillary ultrasound and possible ultrasound core biopsy based on a palpable abnormality in the left axilla by the referring clinician, which is likely not included on this study. BI-RADS 4.   12/9/21 (R) Left US Unilateral left breast: 1. No adenopathy. 2. Palpable finding in the left axillary tail, which on sonographic imaging is the posterior extent of biopsy proven malignancy.   12/9/2021 MRI guided core biopsy not able to be performed to the right breast after multiple attempts   Surgery #1: 01/13/22: bilateral tissue expander breast reconstruction    Surgery #2: 02/08/22: Mastectomy   Final Diagnosis  1. Breast and capsule, left; mastectomy  - Invasive ductal carcinoma, measuring 4.0 cm grossly, see note and synoptic report  - Ductal carcinoma in situ (DCIS), cribriform type with intermediate nuclear grade  - Margins of invasive carcinoma:  Anterior at lower outer quadrant: less than 1 mm  Posterior: less than 1 mm  - Margins of DCIS: Posterior: 2 mm. Anterior: 5 mm  - Lobular carcinoma in situ (LCIS)  - Positive for lymphovascular invasion  - Biopsy site changes  - Benign skeletal muscle and capsule  - Calcifications associated with invasive carcinoma, in situ carcinoma and benign epithelium  2. Posterior margin, left breast, excision: Benign fibroadipose tissue  3. Lymph node, left axilla, sentinel, excision: One out of two lymph nodes positive for metastatic carcinoma (1/2).  Metastatic focus measures 4 mm.  Negative for extranodal extension  4. Breast, left, new anterior margin, 2:00 1 cm FN; excision:  Benign breast tissue  5. Breast, left, new anterior margin, 5:30 6 cm; excision: Benign breast tissue  6. Breast, left, mastectomy skin; excision: Benign skin with changes of prior procedure  Note: Metastatic focus present on permanent sections only and not present on original frozen section slides. Dr. Elise has been notified. E-cadherin and p120 performed on multiple blocks and supports the diagnosis.   Surgery #3: 03/10/22: Bilateral T/E Placement    Surgery #4: 03/11/22: Right T/E Removal    Surgery #5: 10/14/22: delayed right breast tissue expander placement    Surgery #6: 11/17/22: Infected tissue expander removal    Surgery #7: 04/07/23: delayed right breast tissue expander placement    Surgery #8: 07/20/23: bilateral stacked NICOLETTE  Final Diagnosis  1 Left breast tissue expander, gross only.  2 Left breast capsule, excision:  -   Dense fibroconnective tissue with foreign body giant cell reaction.  -   Benign skeletal muscle.  3 Left third costal cartilage, excision: Benign cartilage.  4 Left internal mammary lymph node, excision:  -   Micro metastatic carcinoma, by morphology and history consistent with mammary ductal origin, 1.5 mm in greatest dimension. GATA3 is positive, confirming mammary origin. For ER, DC, HER2 ihc, see below.  -   Extranodal extension is identified (0.5 mm in greatest dimension).  5 Right breast tissue expander, gross only.  6 Right breast capsule, excision:  -   Dense fibroconnective tissue with focal foreign body giant cell reaction, chronic inflammation and granulation tissue.  7 Right third costal cartilage: Benign cartilage.  8 Right internal mammary lymph node, excision: Dense fibroconnective tissue with foreign body giant cell reaction.  -   Benign skeletal muscle.  -   Nerve ganglia.  -   No lymph node identified.   ESTROGEN RECEPTOR: NEGATIVE, NO NUCLEAR STAINING (0)  PROGESTERONE RECEPTOR: NEGATIVE, NO NUCLEAR STAINING (0)  HER-2: EQIVOCAL (2+ <10%)    2/22/24: PET CT 1. Status post bilateral mastectomies with bilateral breast NICOLETTE flap reconstruction. No evidence of recurrence in surgical bed. 2. Resolved right axillary and left internal mammary lymphadenopathy. 3.  Diffuse thyroid activity is slightly greater than that seen on the prior study.

## 2024-03-11 NOTE — VITALS
[Maximal Pain Intensity: 0/10] : 0/10 [NoTreatment Scheduled] : no treatment scheduled [90: Able to carry normal activity; minor signs or symptoms of disease.] : 90: Able to carry normal activity; minor signs or symptoms of disease.  [ECOG Performance Status: 1 - Restricted in physically strenuous activity but ambulatory and able to carry out work of a light or sedentary nature] : Performance Status: 1 - Restricted in physically strenuous activity but ambulatory and able to carry out work of a light or sedentary nature, e.g., light house work, office work [Date: ____________] : Patient's last distress assessment performed on [unfilled]. [Referred Patient  to social work for follow-up] : Patient was referred to social work for follow-up [10 - Extreme Distress] : Distress Level: 10

## 2024-03-11 NOTE — PHYSICAL EXAM
[Supraclavicular Lymph Nodes Enlarged Bilaterally] : supraclavicular [Axillary Lymph Nodes Enlarged Bilaterally] : axillary [Normal] : oriented to person, place and time, the affect was normal, the mood was normal and not anxious [de-identified] : Healing NICOLETTE flap reconstruction. No abnormal masses palpated

## 2024-03-25 ENCOUNTER — RESULT REVIEW (OUTPATIENT)
Age: 54
End: 2024-03-25

## 2024-03-25 ENCOUNTER — APPOINTMENT (OUTPATIENT)
Dept: HEMATOLOGY ONCOLOGY | Facility: CLINIC | Age: 54
End: 2024-03-25
Payer: MEDICAID

## 2024-03-25 ENCOUNTER — NON-APPOINTMENT (OUTPATIENT)
Age: 54
End: 2024-03-25

## 2024-03-25 VITALS
RESPIRATION RATE: 16 BRPM | BODY MASS INDEX: 20.58 KG/M2 | HEART RATE: 62 BPM | WEIGHT: 150.31 LBS | DIASTOLIC BLOOD PRESSURE: 86 MMHG | HEIGHT: 71.5 IN | SYSTOLIC BLOOD PRESSURE: 141 MMHG | TEMPERATURE: 97.2 F | OXYGEN SATURATION: 98 %

## 2024-03-25 DIAGNOSIS — C50.912 MALIGNANT NEOPLASM OF UNSPECIFIED SITE OF LEFT FEMALE BREAST: ICD-10-CM

## 2024-03-25 PROCEDURE — 99214 OFFICE O/P EST MOD 30 MIN: CPT

## 2024-03-25 RX ORDER — NITROFURANTOIN MACROCRYSTALS 100 MG/1
100 CAPSULE ORAL
Qty: 14 | Refills: 2 | Status: COMPLETED | COMMUNITY
Start: 2023-03-03 | End: 2024-03-25

## 2024-03-25 NOTE — REVIEW OF SYSTEMS
[Fatigue] : fatigue [Negative] : Allergic/Immunologic [Constipation] : constipation [FreeTextEntry7] : intertmittent

## 2024-03-25 NOTE — PHYSICAL EXAM
[Fully active, able to carry on all pre-disease performance without restriction] : Status 0 - Fully active, able to carry on all pre-disease performance without restriction [Normal] : affect appropriate [de-identified] : adriel FLAP

## 2024-03-25 NOTE — ASSESSMENT
[Curative] : Goals of care discussed with patient: Curative [Palliative Care Plan] : not applicable at this time [Patient/Caregiver not ready to engage] : Patient/Caregiver not ready to engage [FreeTextEntry1] : 54 yo premenopausal Polish woman ( fluent in English and Polish) referred by Dr. Elise for evaluation of left breast cancer. Biopsy revealed IDC ESTROGEN RECEPTOR: POSITIVE 45% NUCLEAR STAINING WITH WEAK/MODERATE 1+/2+ INTENSITY PROGESTERONE RECEPTOR: POSITIVE 30% NUCLEAR STAINING WITH MODERATE 2+ INTENSITY HER-2: NEGATIVE (0 MEMBRANOUS STAINING) KI 67 35 %.  Imaging studies with left breast mass in US 3.9 x 1.1 x 1.2 cm, MRI max dimension 5.3 cm. Right breast lesion - biopsy unsuccessful.  Patient decided on bilateral mastectomy. She understand indication for systemic chemotherapy based on lymph node status and molecular testing. 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 Reviewed protocols of chemotherapy including ACdd> Tweekly, TC and CMF with the last one with lowest incidence of hair loss. Mammaprint results c/w low recurrence score, reviewed with patient Oncotype Dx 13, distant risk of recurrence 13%, with no benefit from chemotherapy Patient underwent left sided mastectomy - pathology report reviewed, IDC 4 cm, T2, N1, with LVI. 2/22  Patient underwent right sided mastectomy and b/l placement of the expanders, her postop course was complicated by bleeding from right side.  She feels depressed and anxious - had 4 surgeries in the last 6 weeks, 1st one with anaphylactic reaction to MB, left breast mastectomy, followed by right breast mastectomy, complicated by bleeding.   Zoladex since 3/23/2022 Right breast mastectomy - pathology reviewed 3/23/2022- papilloma and ADH Continue ovarian suppression Zoladex 3/2022 > Lupron 3.75 mg q 28 days> Lupron 11.25 mg 3/2/2023, 6/23, 12/23. Start Anastrozole 1 mg PO daily- hold during radiation. S/p radiation July 13, 2022 Tx - Lupron 3.75 mg Q 4 weeks, 6/1/23 Change to Q 3 m 11.25 mg on 12/18/23 Patient underwent bilateral Maddy reconstruction on 7/20/2023 and in the tissue removed during the procedure she was found to have left internal mammary lymph node positive for micrometastatic carcinoma 1.5 mm with extranodal extension, with molecular testing indicating triple negative breast cancer. PET scan performed on August 21, 2023 showed mildly metabolic avid subcentimeter lymph nodes in the right axilla and left internal mammary chain with SUV ranging from 1.2-1.5.  AC dd q 2 weeks followed by Taxol Q 2 weeks x 4- completed 12/18/23 - referred rad onc Dr. Estrada to evaluate if there is a role for additional RT - post tx PETCT   # Anemia - blood loss-, start PO iron, ferrous gluconate PO daily in am. Hg 11.8  # Hyperglycemia - check HgA1c - off steroids - dietitian evaluation  # Depressed mood-Lexapro- 10 mg. Increase to 20 mg  She has limited social support, lives by herself, father and mother ( dementia) in [place], referred to , would benefit from psychosocial support.   # Sinus pressure -ENT referral - rhinorrhea post chemo  # Osteopenia - last bone density March 2023 T-score- 2.4 Risk factors Recommended: 1. Vitamin D 2. Calcium supplement 500mg 3. 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 Barry which should take place first. Labs next visit - CBC, CMP,  # Patient h/o pituitary adenoma. Low FSH, LH, estradiol. Repeat hormones today.   return in 12 weeks reg labs and breast tumor markers
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
## 2024-03-26 ENCOUNTER — APPOINTMENT (OUTPATIENT)
Dept: BREAST CENTER | Facility: CLINIC | Age: 54
End: 2024-03-26
Payer: MEDICAID

## 2024-03-26 VITALS
BODY MASS INDEX: 24.66 KG/M2 | HEART RATE: 66 BPM | HEIGHT: 65 IN | DIASTOLIC BLOOD PRESSURE: 87 MMHG | WEIGHT: 148 LBS | SYSTOLIC BLOOD PRESSURE: 140 MMHG

## 2024-03-26 DIAGNOSIS — Z90.13 ACQUIRED ABSENCE OF BILATERAL BREASTS AND NIPPLES: ICD-10-CM

## 2024-03-26 PROCEDURE — 99214 OFFICE O/P EST MOD 30 MIN: CPT

## 2024-03-26 RX ORDER — DICLOFENAC SODIUM 1% 10 MG/G
1 GEL TOPICAL
Qty: 100 | Refills: 2 | Status: DISCONTINUED | COMMUNITY
Start: 2022-11-08 | End: 2024-03-26

## 2024-03-26 NOTE — HISTORY OF PRESENT ILLNESS
[FreeTextEntry1] : 54 yo female presents for breast cancer surveillance. Patient has a history of left invasive mammary carcinoma, 4.0cm, with focal mammary carcinoma in situ (ER+ MN+ HER2 negative) s/p left mastectomy, left SLNB on 2/8/22 without reconstruction, final surgical pathology from 2/8/22 revealed IDC with 1/2 LN positive. Oncoptye RS 13.  Patient is status post XRT completed on 7/13/2022 (delayed due to insurance denial reasons).  Patient currently taking anastrozole managed by Dr. Marlow. Of note, patient is most recently s/p delayed bilateral NICOLETTE flap reconstruction in 7/2023, and the tissue removed during the procedure the patient was found to have a left internal mammary lymph node that was positive for micrometastatic carcinoma measuring 0.15 cm with extranodal extension, molecular testing was indicative of a triple negative breast cancer. Patient is s/p adjuvant chemotherapy. Recent PET completed 2/2024 showing resolved right axillary and left internal mammary lymphadenopathy.  The patient met with Dr. Estrada for consultation regarding indication for radiation given positive left internal mammary lymph node, it was recommended the patient undergo a consultation for consideration of proton therapy.  Cancer history: Patient has a history of left invasive mammary carcinoma in 2022, 4.0cm, with focal mammary carcinoma in situ (ER+ MN+ HER2 negative) s/p left mastectomy, left SLNB on 2/8/22 without reconstruction, final surgical pathology from 2/8/22 revealed IDC with 1/2 LN positive. Oncoptye RS 13. Patient s/p right mastectomy, right SLNB and bilateral TE placement (by Dr. Barry) on 3/10/22, final surgical pathology of right breast showed IDP and LCIS and 0/2+ SLNs.; immediate post-op course was complicated by right breast hematoma s/p evacuation and washout and removal of TE 3/11/22. S/p delayed right breast TE placement 10/14/22 complicated by infection which was subsequently removed 11/17/22. Of note, the patient was originally planned for b/l mastectomy w/ TE reconstruction; however, surgical case in 2/2022 was complicated by an anaphylactic reaction.  Patient is status post delayed bilateral Nicolette flap reconstruction with Dr. Barry in 7/2023. 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 Patient was receiving Zoladex;however due to insurance her course was changed to Lupron injections and is taking Anastrozole managed by Dr. Marlow. She discontinued Anastrozole on 6/3/2022 in preparation for radiation treatment. She underwent her CT simulation, was prepared to start XRT on 5/11/22, but was delayed due to insurance denial reasons. She has completed XRT of left chest wall and lymph nodes on 7/13/22 with Dr. Romero. Patient has resumed Anastrazole. [id number]  Disease: left breast invasive mammary, 2022 & left triple negative breast cancer IM LN 2023 Tumor Markers: ER+ MN+ HER2 negative 2022, left IM LN 7/2023 positive for triple negative breast cancer Surgical Interventions: b/l mastectomy (left therapeutic 2/2022, right prophylactic 3/2022), delayed b/l NICOLETTE 7/2023 Adjuvant Chemotherapy: s/p Taxol x 4, completed 12/18/23  SERM/AI: on AI since 2022 XRT: left chest wall/axilla 7/2022 Genetics: panel testing 2021 negative

## 2024-03-26 NOTE — PHYSICAL EXAM
[EOMI] : extra ocular movement intact [Normocephalic] : normocephalic [Supple] : supple [Examined in the supine and seated position] : examined in the supine and seated position [No dominant masses] : no dominant masses in right breast  [No dominant masses] : no dominant masses left breast [No Nipple Retraction] : no left nipple retraction [No Nipple Discharge] : no left nipple discharge [No Axillary Lymphadenopathy] : no left axillary lymphadenopathy [No Rashes] : no rashes [No Supraclavicular Adenopathy] : no supraclavicular adenopathy [de-identified] : well-healed mastectomy incision [de-identified] : well healed mastectomy incision

## 2024-03-26 NOTE — PLAN
[TextEntry] : Patient to continue to follow-up with medical oncology Patient to continue to follow-up with radiation oncology Patient to perform self breast exams as instructed in the office Patient to follow-up in 1 year for clinical breast exam

## 2024-03-26 NOTE — DATA REVIEWED
[FreeTextEntry1] : 11/5/21 ([facility]) b/l dx mmg and US: heterogeneously dense. Corresponding to the patient's palpable finding is a 3.9 cm mass in the left breast 1-2 o'clock which is suspicious for malignancy. This would correspond to the area of architectural distortion seen on mammography. Recommend ultrasound-guided core biopsy and clip placement. BI-RADS 4.\par  \par  11/9/21 ([facility] path) US bx:1. Left breast 1:00-2:00 5cm fn, core biopsy:  Invasive moderately differentiated mammary carcinoma with associated calcifications - Focal mammary carcinoma in situ. IHC: ESTROGEN RECEPTOR: POSITIVE 45% NUCLEAR STAINING WITH WEAK/MODERATE 1+/2+ INTENSITY PROGESTERONE RECEPTOR: POSITIVE 30% NUCLEAR STAINING WITH MODERATE 2+ INTENSITY HER-2: NEGATIVE (0 MEMBRANOUS STAINING) \par  \par  11/23/21 ([facility]) MRI: 1. MR guided core biopsy or clip placement for the right 10:00 axis mass enhancement. 2. Appropriate action for the large left breast cancer.  3. Targeted left axillary ultrasound and possible ultrasound core biopsy based on a palpable abnormality in the left axilla by the referring clinician, which is likely not included on this study. BI-RADS 4. \par   \par  12/9/21 ([facility]) Left axilla US:1. No adenopathy. 2. Palpable finding in the left axillary tail is the posterior extent of the biopsy proven malignancy. BI-RADS 6.\par  \par  12/9/21 ([facility]) MRI biopsy not performed: Multiple attempts were made to target the 0.6 cm focus of nonmass enhancement in the right breast. Unfortunately, significant patient motion within the grid, during imaging including between sequences, limited ability to place tissue marker clip, or perform biopsy.Given canceled biopsy recommend targeted right breast ultrasound. If no sonographic correlate is present, then short interval follow-up, 4 months, would be recommended. \par  \par  2/1/22 ([facility]) Surgical path: 1. Breast, left, retroareolar tissue; excision: - Benign breast tissue. 2. Breast, left, new anterior margin; excision: - Microinvasive carcinoma (1.0 mm) involving inked margin, in a background of atypical lobular hyperplasia (ALH), aprocrine metaplasia and fibrocystic changes - See Note.\par  \par  2/8/22 ([facility]) Surgical path: 1. Breast and capsule, left; mastectomy- Invasive ductal carcinoma, measuring 4.0 cm grossly, see note and synoptic report - Ductal carcinoma in situ (DCIS), cribriform type with intermediate nuclear grade\par  - Margins of invasive carcinoma: Anterior at lower outer quadrant: less than 1 mm Posterior: less than 1 mm - Margins of DCIS: Posterior: 2 mm Anterior: 5 mm - Lobular carcinoma in situ (LCIS) - Positive for lymphovascular invasion - Biopsy site changes - Benign skeletal muscle and capsule - Calcifications associated with invasive carcinoma, in situ carcinoma and benign epithelium\par  2. Posterior margin, left breast, excision: - Benign fibroadipose tissue\par  3. Lymph node, left axilla, sentinel, excision: - One out of two lymph nodes positive for metastatic carcinoma (1/2) - Metastatic focus measures 4 mm - Negative for extranodal extension \par  4. Breast, left, new anterior margin, 2:00 1 cm FN; excision: - Benign breast tissue \par  5. Breast, left, new anterior margin, 5:30 6 cm; excision: - Benign breast tissue\par   6. Breast, left, mastectomy skin; excision: - Benign skin with changes of prior procedure \par  \par  10/4/2022 ([facility]) bilateral axillary US showing A targeted bilateral breast ultrasound was performed.\par  FINDINGS:The right axilla appears normal. There are no masses there is no lymphadenopathy. IMPRESSION: Normal right axilla. Clinical correlation and assessment. ASSESSMENT: BI-RADS Category 2:  Benign.

## 2024-03-26 NOTE — PAST MEDICAL HISTORY
[Menarche Age ____] : age at menarche was [unfilled] [Menopause Age____] : age at menopause was [unfilled] [Definite ___ (Date)] : the last menstrual period was [unfilled] [Total Preg ___] : G[unfilled] [Living ___] : Living: [unfilled] [AB Spont ___] : miscarriages: [unfilled]  [History of Hormone Replacement Treatment] : has no history of hormone replacement treatment [FreeTextEntry5] : No [FreeTextEntry6] : No [FreeTextEntry8] : n/a [FreeTextEntry7] : Yes

## 2024-03-26 NOTE — ASSESSMENT
[FreeTextEntry1] : 53-year-old female with personal history of left breast cancer in 2022 status post bilateral mastectomy

## 2024-03-29 ENCOUNTER — NON-APPOINTMENT (OUTPATIENT)
Age: 54
End: 2024-03-29

## 2024-04-01 ENCOUNTER — NON-APPOINTMENT (OUTPATIENT)
Age: 54
End: 2024-04-01

## 2024-04-02 ENCOUNTER — NON-APPOINTMENT (OUTPATIENT)
Age: 54
End: 2024-04-02

## 2024-04-04 ENCOUNTER — NON-APPOINTMENT (OUTPATIENT)
Age: 54
End: 2024-04-04

## 2024-04-05 ENCOUNTER — NON-APPOINTMENT (OUTPATIENT)
Age: 54
End: 2024-04-05

## 2024-04-12 ENCOUNTER — NON-APPOINTMENT (OUTPATIENT)
Age: 54
End: 2024-04-12

## 2024-04-15 ENCOUNTER — NON-APPOINTMENT (OUTPATIENT)
Age: 54
End: 2024-04-15

## 2024-04-18 ENCOUNTER — NON-APPOINTMENT (OUTPATIENT)
Age: 54
End: 2024-04-18

## 2024-04-19 ENCOUNTER — NON-APPOINTMENT (OUTPATIENT)
Age: 54
End: 2024-04-19

## 2024-04-22 ENCOUNTER — NON-APPOINTMENT (OUTPATIENT)
Age: 54
End: 2024-04-22

## 2024-04-23 ENCOUNTER — NON-APPOINTMENT (OUTPATIENT)
Age: 54
End: 2024-04-23

## 2024-05-02 ENCOUNTER — NON-APPOINTMENT (OUTPATIENT)
Age: 54
End: 2024-05-02

## 2024-05-03 ENCOUNTER — NON-APPOINTMENT (OUTPATIENT)
Age: 54
End: 2024-05-03

## 2024-05-17 ENCOUNTER — NON-APPOINTMENT (OUTPATIENT)
Age: 54
End: 2024-05-17

## 2024-05-21 ENCOUNTER — NON-APPOINTMENT (OUTPATIENT)
Age: 54
End: 2024-05-21

## 2024-05-29 ENCOUNTER — NON-APPOINTMENT (OUTPATIENT)
Age: 54
End: 2024-05-29

## 2024-06-07 ENCOUNTER — NON-APPOINTMENT (OUTPATIENT)
Age: 54
End: 2024-06-07

## 2024-06-11 ENCOUNTER — NON-APPOINTMENT (OUTPATIENT)
Age: 54
End: 2024-06-11

## 2024-06-13 ENCOUNTER — NON-APPOINTMENT (OUTPATIENT)
Age: 54
End: 2024-06-13

## 2024-06-20 NOTE — ASU PATIENT PROFILE, ADULT - PATIENT REPRESENTATIVE NAME
Initial enrollment visit with [facility] today  Discussed our role and function to support Chad's development and parent goals  Weight and growth are primary concern: Discussed with parent and RD at length  Recommend increasing calories per RD recommendations and if no weight gain or weight loss at next visit with Dr. Josef Navarro would recommend overnight feeds.  Recommend oxygen at night time, for SEBASTIEN and support for growth.   Will follow up with ophthalmology  ENT follow up is PRN as seen with Dr. Lorenzo and healing well, however needs an audiolgoy follow up for hearing screen and will coordinate at next visit  Parent expressed concern about fluoride treament: scheduled with dental and will discus.      father BELLA GRAVES [phone]

## 2024-07-02 ENCOUNTER — RESULT REVIEW (OUTPATIENT)
Age: 54
End: 2024-07-02

## 2024-07-02 ENCOUNTER — NON-APPOINTMENT (OUTPATIENT)
Age: 54
End: 2024-07-02

## 2024-07-02 ENCOUNTER — APPOINTMENT (OUTPATIENT)
Dept: HEMATOLOGY ONCOLOGY | Facility: CLINIC | Age: 54
End: 2024-07-02
Payer: MEDICAID

## 2024-07-02 VITALS
HEIGHT: 65 IN | HEART RATE: 67 BPM | TEMPERATURE: 97.4 F | RESPIRATION RATE: 16 BRPM | DIASTOLIC BLOOD PRESSURE: 84 MMHG | SYSTOLIC BLOOD PRESSURE: 130 MMHG | OXYGEN SATURATION: 98 % | WEIGHT: 148.5 LBS | BODY MASS INDEX: 24.74 KG/M2

## 2024-07-02 DIAGNOSIS — F41.9 ANXIETY DISORDER, UNSPECIFIED: ICD-10-CM

## 2024-07-02 DIAGNOSIS — G62.0 DRUG-INDUCED POLYNEUROPATHY: ICD-10-CM

## 2024-07-02 DIAGNOSIS — T45.1X5A DRUG-INDUCED POLYNEUROPATHY: ICD-10-CM

## 2024-07-02 DIAGNOSIS — C77.9 SECONDARY AND UNSPECIFIED MALIGNANT NEOPLASM OF LYMPH NODE, UNSPECIFIED: ICD-10-CM

## 2024-07-02 DIAGNOSIS — C50.912 MALIGNANT NEOPLASM OF UNSPECIFIED SITE OF LEFT FEMALE BREAST: ICD-10-CM

## 2024-07-02 DIAGNOSIS — Z17.0 MALIGNANT NEOPLASM OF OVERLAPPING SITES OF LEFT FEMALE BREAST: ICD-10-CM

## 2024-07-02 DIAGNOSIS — C50.812 MALIGNANT NEOPLASM OF OVERLAPPING SITES OF LEFT FEMALE BREAST: ICD-10-CM

## 2024-07-02 DIAGNOSIS — Z78.0 OTHER SPECIFIED DISORDERS OF BONE DENSITY AND STRUCTURE, UNSPECIFIED SITE: ICD-10-CM

## 2024-07-02 DIAGNOSIS — C77.3 MALIGNANT NEOPLASM OF UNSPECIFIED SITE OF LEFT FEMALE BREAST: ICD-10-CM

## 2024-07-02 DIAGNOSIS — M85.80 OTHER SPECIFIED DISORDERS OF BONE DENSITY AND STRUCTURE, UNSPECIFIED SITE: ICD-10-CM

## 2024-07-02 DIAGNOSIS — C50.919 MALIGNANT NEOPLASM OF UNSPECIFIED SITE OF UNSPECIFIED FEMALE BREAST: ICD-10-CM

## 2024-07-02 DIAGNOSIS — F32.A ANXIETY DISORDER, UNSPECIFIED: ICD-10-CM

## 2024-07-02 DIAGNOSIS — D35.2 BENIGN NEOPLASM OF PITUITARY GLAND: ICD-10-CM

## 2024-07-02 PROCEDURE — 99214 OFFICE O/P EST MOD 30 MIN: CPT

## 2024-07-08 ENCOUNTER — NON-APPOINTMENT (OUTPATIENT)
Age: 54
End: 2024-07-08

## 2024-07-20 ENCOUNTER — RESULT REVIEW (OUTPATIENT)
Age: 54
End: 2024-07-20

## 2024-07-26 ENCOUNTER — RESULT REVIEW (OUTPATIENT)
Age: 54
End: 2024-07-26

## 2024-07-26 PROBLEM — M54.2 CERVICAL PAIN: Status: ACTIVE | Noted: 2024-07-26

## 2024-07-26 NOTE — RESULTS/DATA
[FreeTextEntry1] : Exam: MRI CERVICAL SPINE [facility] Order#: [id number] CERVICAL SPINE MRI WITH AND WITHOUT CONTRAST CLINICAL INFORMATION: Bilateral arm weakness and paresthesias. TECHNIQUE: Multiplanar, multisequence MRI was obtained of the cervical spine with and without contrast. 6.5 cc of Gadavist was administered intravenously with 1 cc discarded. FINDINGS: CERVICAL LEVEL EVALUATION: C1/2: Mild arthrosis between the dens and the anterior arch of C1. C2/C3: Moderate left facet arthrosis. C3/C4: Small posterior disc protrusion with right-sided uncovertebral spurring. Mild right foraminal narrowing. Moderate left facet arthrosis. C4/C5: Moderate to severe left facet arthrosis with associated marrow edema. Mild anterolisthesis with uncovering of the posterior aspect of the disc along with mild disc bulging. Mild bilateral foraminal narrowing. C5/C6: Mild disc height loss with a small to moderate broad-based posterior disc protrusion with associated uncovertebral spurring. Moderate to severe right and mild to moderate left foraminal narrowing. Disc indents the ventral thecal sac without contact upon the cord. C6/C7: Small central disc protrusion that mildly flattens the ventral thecal sac without contact upon the cord. C7/T1: Normal ALIGNMENT: Straightening of the normal cervical lordosis with mild anterolisthesis at C4-C5 and mild retrolisthesis at C5-C6. CORD: No cord signal abnormality. MARROW: Minimal endplate marrow edema at C5-C6. IMAGED BRAIN: Normal PERIPHERAL/NECK SOFT TISSUES: Normal Postcontrast imaging does not demonstrate abnormal enhancement. IMPRESSION: Multilevel cervical spondylosis that is most advanced at C4-C5 and C5-C6. C4/C5: Moderate to severe left facet arthrosis with associated marrow edema. Mild anterolisthesis with uncovering of the posterior aspect of the disc along with mild disc bulging. Mild bilateral foraminal narrowing. C5/C6: Mild disc height loss with a small to moderate broad-based posterior disc protrusion with associated uncovertebral spurring. Moderate to severe right and mild to moderate left foraminal narrowing. Disc indents the ventral thecal sac without contact upon the cord. --- End of Report ---  ***Electronically Signed *** -----------------------------------------------

## 2024-07-26 NOTE — RESULTS/DATA
[FreeTextEntry1] : Exam: MRI CERVICAL SPINE [facility] Order#: [id number] CERVICAL SPINE MRI WITH AND WITHOUT CONTRAST CLINICAL INFORMATION: Bilateral arm weakness and paresthesias. TECHNIQUE: Multiplanar, multisequence MRI was obtained of the cervical spine with and without contrast. 6.5 cc of Gadavist was administered intravenously with 1 cc discarded. FINDINGS: CERVICAL LEVEL EVALUATION: C1/2: Mild arthrosis between the dens and the anterior arch of C1. C2/C3: Moderate left facet arthrosis. C3/C4: Small posterior disc protrusion with right-sided uncovertebral spurring. Mild right foraminal narrowing. Moderate left facet arthrosis. C4/C5: Moderate to severe left facet arthrosis with associated marrow edema. Mild anterolisthesis with uncovering of the posterior aspect of the disc along with mild disc bulging. Mild bilateral foraminal narrowing. C5/C6: Mild disc height loss with a small to moderate broad-based posterior disc protrusion with associated uncovertebral spurring. Moderate to severe right and mild to moderate left foraminal narrowing. Disc indents the ventral thecal sac without contact upon the cord. C6/C7: Small central disc protrusion that mildly flattens the ventral thecal sac without contact upon the cord. C7/T1: Normal ALIGNMENT: Straightening of the normal cervical lordosis with mild anterolisthesis at C4-C5 and mild retrolisthesis at C5-C6. CORD: No cord signal abnormality. MARROW: Minimal endplate marrow edema at C5-C6. IMAGED BRAIN: Normal PERIPHERAL/NECK SOFT TISSUES: Normal Postcontrast imaging does not demonstrate abnormal enhancement. IMPRESSION: Multilevel cervical spondylosis that is most advanced at C4-C5 and C5-C6. C4/C5: Moderate to severe left facet arthrosis with associated marrow edema. Mild anterolisthesis with uncovering of the posterior aspect of the disc along with mild disc bulging. Mild bilateral foraminal narrowing. C5/C6: Mild disc height loss with a small to moderate broad-based posterior disc protrusion with associated uncovertebral spurring. Moderate to severe right and mild to moderate left foraminal narrowing. Disc indents the ventral thecal sac without contact upon the cord. --- End of Report ---  ***Electronically Signed *** -----------------------------------------------

## 2024-07-26 NOTE — RESULTS/DATA
[FreeTextEntry1] : Exam: MRI CERVICAL SPINE [facility] Order#: [id number] CERVICAL SPINE MRI WITH AND WITHOUT CONTRAST CLINICAL INFORMATION: Bilateral arm weakness and paresthesias. TECHNIQUE: Multiplanar, multisequence MRI was obtained of the cervical spine with and without contrast. 6.5 cc of Gadavist was administered intravenously with 1 cc discarded. FINDINGS: CERVICAL LEVEL EVALUATION: C1/2: Mild arthrosis between the dens and the anterior arch of C1. C2/C3: Moderate left facet arthrosis. C3/C4: Small posterior disc protrusion with right-sided uncovertebral spurring. Mild right foraminal narrowing. Moderate left facet arthrosis. C4/C5: Moderate to severe left facet arthrosis with associated marrow edema. Mild anterolisthesis with uncovering of the posterior aspect of the disc along with mild disc bulging. Mild bilateral foraminal narrowing. C5/C6: Mild disc height loss with a small to moderate broad-based posterior disc protrusion with associated uncovertebral spurring. Moderate to severe right and mild to moderate left foraminal narrowing. Disc indents the ventral thecal sac without contact upon the cord. C6/C7: Small central disc protrusion that mildly flattens the ventral thecal sac without contact upon the cord. C7/T1: Normal ALIGNMENT: Straightening of the normal cervical lordosis with mild anterolisthesis at C4-C5 and mild retrolisthesis at C5-C6. CORD: No cord signal abnormality. MARROW: Minimal endplate marrow edema at C5-C6. IMAGED BRAIN: Normal PERIPHERAL/NECK SOFT TISSUES: Normal Postcontrast imaging does not demonstrate abnormal enhancement. IMPRESSION: Multilevel cervical spondylosis that is most advanced at C4-C5 and C5-C6. C4/C5: Moderate to severe left facet arthrosis with associated marrow edema. Mild anterolisthesis with uncovering of the posterior aspect of the disc along with mild disc bulging. Mild bilateral foraminal narrowing. C5/C6: Mild disc height loss with a small to moderate broad-based posterior disc protrusion with associated uncovertebral spurring. Moderate to severe right and mild to moderate left foraminal narrowing. Disc indents the ventral thecal sac without contact upon the cord. --- End of Report ---  ***Electronically Signed *** -----------------------------------------------

## 2024-07-29 PROBLEM — M48.02 CERVICAL STENOSIS OF SPINE: Status: ACTIVE | Noted: 2024-07-29

## 2024-07-31 ENCOUNTER — OUTPATIENT (OUTPATIENT)
Dept: OUTPATIENT SERVICES | Facility: HOSPITAL | Age: 54
LOS: 1 days | End: 2024-07-31
Payer: MEDICAID

## 2024-07-31 ENCOUNTER — APPOINTMENT (OUTPATIENT)
Dept: SPINE | Facility: CLINIC | Age: 54
End: 2024-07-31
Payer: MEDICAID

## 2024-07-31 VITALS
SYSTOLIC BLOOD PRESSURE: 122 MMHG | OXYGEN SATURATION: 98 % | WEIGHT: 147 LBS | RESPIRATION RATE: 18 BRPM | HEART RATE: 73 BPM | HEIGHT: 65 IN | BODY MASS INDEX: 24.49 KG/M2 | DIASTOLIC BLOOD PRESSURE: 82 MMHG | TEMPERATURE: 97.8 F

## 2024-07-31 DIAGNOSIS — Z86.59 PERSONAL HISTORY OF OTHER MENTAL AND BEHAVIORAL DISORDERS: ICD-10-CM

## 2024-07-31 DIAGNOSIS — Z78.9 OTHER SPECIFIED HEALTH STATUS: ICD-10-CM

## 2024-07-31 DIAGNOSIS — Z90.13 ACQUIRED ABSENCE OF BILATERAL BREASTS AND NIPPLES: Chronic | ICD-10-CM

## 2024-07-31 DIAGNOSIS — M54.2 CERVICALGIA: ICD-10-CM

## 2024-07-31 DIAGNOSIS — Z83.3 FAMILY HISTORY OF DIABETES MELLITUS: ICD-10-CM

## 2024-07-31 DIAGNOSIS — N88.8 OTHER SPECIFIED NONINFLAMMATORY DISORDERS OF CERVIX UTERI: Chronic | ICD-10-CM

## 2024-07-31 DIAGNOSIS — M48.02 SPINAL STENOSIS, CERVICAL REGION: ICD-10-CM

## 2024-07-31 DIAGNOSIS — Z82.49 FAMILY HISTORY OF ISCHEMIC HEART DISEASE AND OTHER DISEASES OF THE CIRCULATORY SYSTEM: ICD-10-CM

## 2024-07-31 DIAGNOSIS — Z98.82 BREAST IMPLANT STATUS: Chronic | ICD-10-CM

## 2024-07-31 DIAGNOSIS — Z98.890 OTHER SPECIFIED POSTPROCEDURAL STATES: Chronic | ICD-10-CM

## 2024-07-31 DIAGNOSIS — M20.21 HALLUX RIGIDUS, RIGHT FOOT: ICD-10-CM

## 2024-07-31 PROCEDURE — 72052 X-RAY EXAM NECK SPINE 6/>VWS: CPT

## 2024-07-31 PROCEDURE — 99203 OFFICE O/P NEW LOW 30 MIN: CPT

## 2024-07-31 PROCEDURE — 72052 X-RAY EXAM NECK SPINE 6/>VWS: CPT | Mod: 26

## 2024-07-31 RX ORDER — LEUPROLIDE ACETATE 1 MG/0.2ML
VIAL (ML) SUBCUTANEOUS
Refills: 0 | Status: ACTIVE | COMMUNITY

## 2024-07-31 NOTE — HISTORY OF PRESENT ILLNESS
[de-identified] : 53 y/o female with PMHx of invasive breast cancer dx 2021 s/p bilateral breast NICOLETTE flap reconstruction 7/20/23 (molecular testing indicated triple negative) who recently completed MRI cervical spine due to symptoms of hand clumsiness/ dropping things, tingling sensations, imbalance.   MRI cervical done 7/20/24 with report of: - Multilevel cervical spondylosis that is most advanced at C4-C5 and C5-C6 - C4/C5: Moderate to severe left facet arthrosis with associated marrow edema. Mild anterolisthesis with uncovering of the posterior aspect of the disc along with mild disc bulging. Mild bilateral foraminal narrowing. - C5/C6: Mild disc height loss with a small to moderate broad-based posterior disc protrusion with associated uncovertebral spurring. Moderate to severe right and mild to moderate left foraminal narrowing. Disc indents the ventral thecal sac without contact upon the cord.

## 2024-07-31 NOTE — ASSESSMENT
[FreeTextEntry1] : I, Dr. Gilliland, personally performed the evaluation and management (E/M) services for this new patient who presents today with a new problem. That E/M includes conducting the examination, assessing all new/exacerbated conditions, and establishing a new plan of care. Today, my ACP, Shanika Siddiqui, was here to observe my evaluation and management services for this new problem/exacerbated condition to be followed going forward.  Plan: -

## 2024-07-31 NOTE — ADDENDUM
[FreeTextEntry1] : Patient Name: MARY REYES   Chief Complaint (CC):   - The patient complains of severe neck pain, pain radiating into the arm, and some hand weakness.   History of Present Illness:   - The patient, who has been undergoing treatment for cancer, reports experiencing the pain and weakness since her chemotherapy treatment started. The patient explains that she also has scoliosis. Additionally, she complains that the discomfort intensifies in the mornings but has slightly improved since discontinuing chemotherapy. The patient has also undergone radiation therapy twice, which has contributed to fatigue and has affected her ability to participate consistently in physical therapy. The patient also reports issues associated with her bones, neck popping, inadvertent dropping of items, falling incidents, and numbness during sleep.   Past Medical History (PMH):   - The patient has a history of cancer which has involved several rounds of chemotherapy, proton radiation, and multiple surgeries including a breast reconstruction surgery. She also has scoliosis and a bone spur in her foot requiring surgery. The patient is on Tamoxifen for cancer treatment. Also noteworthy is that she has had nine surgeries to date relating to her breast reconstruction.   Family History (FH):   - The patient did not provide any information about her family's health history.   Social History (SH):   - The patient is currently not working due to her health condition. There was no mention of smoking, alcohol use, drug use, or sexual history. The patient is seeking disability benefits due to her health complications and impact on her ability to work.   Medications:   - The patient is currently on Tamoxifen for cancer treatment. No other medications were mentioned.   Allergies:   - No known allergies were mentioned by the patient.   Review of Systems (ROS):   - The patient notes general fatigue and weakness, particularly in her hand. She also mentions episodes of numbness in her leg whilst asleep. The patient reports reduced dexterity in her fingers.   Physical Examination (PE):   - The MRI of the patient's neck shows a bone (C4) slightly shifted forward on another bone (C5), causing some of the disc material to protrude. There is evidence of degeneration, signifying drying of the disc, and formation of some bone spurs. However, the condition is not considered severe. During neutral, forward and backward head movements, the bones didn't shift a lot indicating there's no abnormal movement.   Laboratory/Data Review:   - An MRI review showed that the patient's vertebra is slightly shifted causing some dysmaterial to release. There are signs of degeneration with some formation of bone spur but is not considered severe enough for surgery.   Assessment:   - The patient presents with significant neck pain and hand weakness, related to nerve compression from a forward-shifted vertebra in her neck. Additionally, she experiences numbness during sleep.   Plan:   - The patient is scheduled for physical and occupational therapy to help with her hand dexterity and neck pain. Pain management will largely involve nonoperative methods, considering her upcoming breast reconstruction surgery. Possible interventional therapy, such as injections, is postponed. The patient is advised to continue taking Calcium and Vitamin D supplements. The patient is also advised to return if she experiences severe pain, if the weakness or numbness gets worse, or if her condition in any way degenerates.   Preventive Health:   - Not enough information was provided to assess preventive measures. However, the patient was advised to continue taking her Calcium and Vitamin D supplements, which can help prevent further bone weakness or issues.

## 2024-07-31 NOTE — DATA REVIEWED
[de-identified] : [facility]                                          [address], [place]  [address]                                        Department of Radiology                                             [phone]   Patient Name:      MARY REYES                Location:       [facility] Rec #:        [id number]                    Account #:      [id number] YOB: 1970                    Ordering:       Brittnee Marlow MD Age: 54               Sex:    F                 Attending:      Brittnee Marlow MD PCP:        NO PRIMARY CARE PHYSICIAN ______________________________________________________________________________________  Exam Date:      07/20/24 Exam:         MRI CERVICAL SPINE [facility] Order#:       MRI [id number]      CERVICAL SPINE MRI WITH AND WITHOUT CONTRAST   CLINICAL INFORMATION: Bilateral arm weakness and paresthesias.  TECHNIQUE: Multiplanar, multisequence MRI was obtained of the cervical spine with and without contrast. 6.5 cc of Gadavist was administered intravenously with 1 cc discarded.   FINDINGS:   CERVICAL LEVEL EVALUATION:   C1/2: Mild arthrosis between the dens and the anterior arch of C1.  C2/C3: Moderate left facet arthrosis.  C3/C4: Small posterior disc protrusion with right-sided uncovertebral spurring. Mild right foraminal narrowing. Moderate left facet arthrosis.  C4/C5: Moderate to severe left facet arthrosis with associated marrow edema. Mild anterolisthesis with uncovering of the posterior aspect of the disc along with mild disc bulging. Mild bilateral foraminal narrowing.  C5/C6: Mild disc height loss with a small to moderate broad-based posterior disc protrusion with associated uncovertebral spurring. Moderate to severe right and mild to moderate left foraminal narrowing. Disc indents the ventral thecal sac without contact upon the cord.  C6/C7: Small central disc protrusion that mildly flattens the ventral thecal sac without contact upon the cord.  C7/T1: Normal   ALIGNMENT: Straightening of the normal cervical lordosis with mild anterolisthesis at C4-C5 and mild retrolisthesis at C5-C6.  CORD: No cord signal abnormality.  MARROW: Minimal endplate marrow edema at C5-C6.  IMAGED BRAIN: Normal  PERIPHERAL/NECK SOFT TISSUES: Normal   Postcontrast imaging does not demonstrate abnormal enhancement.   IMPRESSION: Multilevel cervical spondylosis that is most advanced at C4-C5 and C5-C6.   C4/C5: Moderate to severe left facet arthrosis with associated marrow edema. Mild anterolisthesis with uncovering of the posterior aspect of the disc along with mild disc bulging. Mild bilateral foraminal narrowing.   C5/C6: Mild disc height loss with a small to moderate broad-based posterior disc protrusion with associated uncovertebral spurring. Moderate to severe right and mild to moderate left foraminal narrowing. Disc indents the ventral thecal sac without contact upon the cord.    --- End of Report ---  ***Electronically Signed *** ----------------------------------------------- Dany Boucher MD              07/26/24 0914  Dictated on 07/26/24   Report cc:  Brittnee Marlow MD;

## 2024-07-31 NOTE — HISTORY OF PRESENT ILLNESS
[de-identified] : 55 y/o female with PMHx of invasive breast cancer dx 2021 s/p bilateral breast NICOLETTE flap reconstruction 7/20/23 (molecular testing indicated triple negative) who recently completed MRI cervical spine due to symptoms of hand clumsiness/ dropping things, tingling sensations, imbalance.   MRI cervical done 7/20/24 with report of: - Multilevel cervical spondylosis that is most advanced at C4-C5 and C5-C6 - C4/C5: Moderate to severe left facet arthrosis with associated marrow edema. Mild anterolisthesis with uncovering of the posterior aspect of the disc along with mild disc bulging. Mild bilateral foraminal narrowing. - C5/C6: Mild disc height loss with a small to moderate broad-based posterior disc protrusion with associated uncovertebral spurring. Moderate to severe right and mild to moderate left foraminal narrowing. Disc indents the ventral thecal sac without contact upon the cord.

## 2024-07-31 NOTE — HISTORY OF PRESENT ILLNESS
[de-identified] : 55 y/o female with PMHx of invasive breast cancer dx 2021 s/p bilateral breast NICOLETTE flap reconstruction 7/20/23 (molecular testing indicated triple negative) who recently completed MRI cervical spine due to symptoms of hand clumsiness/ dropping things, tingling sensations, imbalance.   MRI cervical done 7/20/24 with report of: - Multilevel cervical spondylosis that is most advanced at C4-C5 and C5-C6 - C4/C5: Moderate to severe left facet arthrosis with associated marrow edema. Mild anterolisthesis with uncovering of the posterior aspect of the disc along with mild disc bulging. Mild bilateral foraminal narrowing. - C5/C6: Mild disc height loss with a small to moderate broad-based posterior disc protrusion with associated uncovertebral spurring. Moderate to severe right and mild to moderate left foraminal narrowing. Disc indents the ventral thecal sac without contact upon the cord.

## 2024-07-31 NOTE — DATA REVIEWED
[de-identified] : [facility]                                          [address], [place]  [address]                                        Department of Radiology                                             [phone]   Patient Name:      MARY REYES                Location:       [facility] Rec #:        [id number]                    Account #:      [id number] YOB: 1970                    Ordering:       Brittnee Marlow MD Age: 54               Sex:    F                 Attending:      Brittnee Marlow MD PCP:        NO PRIMARY CARE PHYSICIAN ______________________________________________________________________________________  Exam Date:      07/20/24 Exam:         MRI CERVICAL SPINE [facility] Order#:       MRI [id number]      CERVICAL SPINE MRI WITH AND WITHOUT CONTRAST   CLINICAL INFORMATION: Bilateral arm weakness and paresthesias.  TECHNIQUE: Multiplanar, multisequence MRI was obtained of the cervical spine with and without contrast. 6.5 cc of Gadavist was administered intravenously with 1 cc discarded.   FINDINGS:   CERVICAL LEVEL EVALUATION:   C1/2: Mild arthrosis between the dens and the anterior arch of C1.  C2/C3: Moderate left facet arthrosis.  C3/C4: Small posterior disc protrusion with right-sided uncovertebral spurring. Mild right foraminal narrowing. Moderate left facet arthrosis.  C4/C5: Moderate to severe left facet arthrosis with associated marrow edema. Mild anterolisthesis with uncovering of the posterior aspect of the disc along with mild disc bulging. Mild bilateral foraminal narrowing.  C5/C6: Mild disc height loss with a small to moderate broad-based posterior disc protrusion with associated uncovertebral spurring. Moderate to severe right and mild to moderate left foraminal narrowing. Disc indents the ventral thecal sac without contact upon the cord.  C6/C7: Small central disc protrusion that mildly flattens the ventral thecal sac without contact upon the cord.  C7/T1: Normal   ALIGNMENT: Straightening of the normal cervical lordosis with mild anterolisthesis at C4-C5 and mild retrolisthesis at C5-C6.  CORD: No cord signal abnormality.  MARROW: Minimal endplate marrow edema at C5-C6.  IMAGED BRAIN: Normal  PERIPHERAL/NECK SOFT TISSUES: Normal   Postcontrast imaging does not demonstrate abnormal enhancement.   IMPRESSION: Multilevel cervical spondylosis that is most advanced at C4-C5 and C5-C6.   C4/C5: Moderate to severe left facet arthrosis with associated marrow edema. Mild anterolisthesis with uncovering of the posterior aspect of the disc along with mild disc bulging. Mild bilateral foraminal narrowing.   C5/C6: Mild disc height loss with a small to moderate broad-based posterior disc protrusion with associated uncovertebral spurring. Moderate to severe right and mild to moderate left foraminal narrowing. Disc indents the ventral thecal sac without contact upon the cord.    --- End of Report ---  ***Electronically Signed *** ----------------------------------------------- Dany Boucher MD              07/26/24 0914  Dictated on 07/26/24   Report cc:  Brittnee Marlow MD;

## 2024-07-31 NOTE — DATA REVIEWED
[de-identified] : [facility]                                          [address], [place]  [address]                                        Department of Radiology                                             [phone]   Patient Name:      MARY REYES                Location:       [facility] Rec #:        [id number]                    Account #:      [id number] YOB: 1970                    Ordering:       Brittnee Marlow MD Age: 54               Sex:    F                 Attending:      Brittnee Marlow MD PCP:        NO PRIMARY CARE PHYSICIAN ______________________________________________________________________________________  Exam Date:      07/20/24 Exam:         MRI CERVICAL SPINE [facility] Order#:       MRI [id number]      CERVICAL SPINE MRI WITH AND WITHOUT CONTRAST   CLINICAL INFORMATION: Bilateral arm weakness and paresthesias.  TECHNIQUE: Multiplanar, multisequence MRI was obtained of the cervical spine with and without contrast. 6.5 cc of Gadavist was administered intravenously with 1 cc discarded.   FINDINGS:   CERVICAL LEVEL EVALUATION:   C1/2: Mild arthrosis between the dens and the anterior arch of C1.  C2/C3: Moderate left facet arthrosis.  C3/C4: Small posterior disc protrusion with right-sided uncovertebral spurring. Mild right foraminal narrowing. Moderate left facet arthrosis.  C4/C5: Moderate to severe left facet arthrosis with associated marrow edema. Mild anterolisthesis with uncovering of the posterior aspect of the disc along with mild disc bulging. Mild bilateral foraminal narrowing.  C5/C6: Mild disc height loss with a small to moderate broad-based posterior disc protrusion with associated uncovertebral spurring. Moderate to severe right and mild to moderate left foraminal narrowing. Disc indents the ventral thecal sac without contact upon the cord.  C6/C7: Small central disc protrusion that mildly flattens the ventral thecal sac without contact upon the cord.  C7/T1: Normal   ALIGNMENT: Straightening of the normal cervical lordosis with mild anterolisthesis at C4-C5 and mild retrolisthesis at C5-C6.  CORD: No cord signal abnormality.  MARROW: Minimal endplate marrow edema at C5-C6.  IMAGED BRAIN: Normal  PERIPHERAL/NECK SOFT TISSUES: Normal   Postcontrast imaging does not demonstrate abnormal enhancement.   IMPRESSION: Multilevel cervical spondylosis that is most advanced at C4-C5 and C5-C6.   C4/C5: Moderate to severe left facet arthrosis with associated marrow edema. Mild anterolisthesis with uncovering of the posterior aspect of the disc along with mild disc bulging. Mild bilateral foraminal narrowing.   C5/C6: Mild disc height loss with a small to moderate broad-based posterior disc protrusion with associated uncovertebral spurring. Moderate to severe right and mild to moderate left foraminal narrowing. Disc indents the ventral thecal sac without contact upon the cord.    --- End of Report ---  ***Electronically Signed *** ----------------------------------------------- Dany Boucher MD              07/26/24 0914  Dictated on 07/26/24   Report cc:  Brittnee Marlow MD;

## 2024-07-31 NOTE — PHYSICAL EXAM
[General Appearance - Alert] : alert [General Appearance - In No Acute Distress] : in no acute distress [Oriented To Time, Place, And Person] : oriented to person, place, and time [Impaired Insight] : insight and judgment were intact [Affect] : the affect was normal [Motor Handedness Right-Handed] : the patient is right hand dominant [5] : C5 Biceps 5/5 [Over the Past 2 Weeks, Have You Felt Down, Depressed, or Hopeless?] : 1.) Over the past 2 weeks, have you felt down, depressed, or hopeless? No [Over the Past 2 Weeks, Have You Felt Little Interest or Pleasure Doing Things?] : 2.) Over the past 2 weeks, have you felt little interest or pleasure doing things? No

## 2024-08-04 ENCOUNTER — NON-APPOINTMENT (OUTPATIENT)
Age: 54
End: 2024-08-04

## 2024-08-06 ENCOUNTER — NON-APPOINTMENT (OUTPATIENT)
Age: 54
End: 2024-08-06

## 2024-08-07 ENCOUNTER — NON-APPOINTMENT (OUTPATIENT)
Age: 54
End: 2024-08-07

## 2024-08-09 ENCOUNTER — NON-APPOINTMENT (OUTPATIENT)
Age: 54
End: 2024-08-09

## 2024-08-12 ENCOUNTER — NON-APPOINTMENT (OUTPATIENT)
Age: 54
End: 2024-08-12

## 2024-08-23 ENCOUNTER — NON-APPOINTMENT (OUTPATIENT)
Age: 54
End: 2024-08-23

## 2024-08-29 ENCOUNTER — NON-APPOINTMENT (OUTPATIENT)
Age: 54
End: 2024-08-29

## 2024-09-04 ENCOUNTER — NON-APPOINTMENT (OUTPATIENT)
Age: 54
End: 2024-09-04

## 2024-09-06 ENCOUNTER — NON-APPOINTMENT (OUTPATIENT)
Age: 54
End: 2024-09-06

## 2024-09-11 ENCOUNTER — NON-APPOINTMENT (OUTPATIENT)
Age: 54
End: 2024-09-11

## 2024-09-12 NOTE — DISCHARGE INSTRUCTIONS: GENERAL THERAPY - PATIENT SIGNATURE -
Urology Progress Note     Patient: Rad Peterson               Sex: male           MRN: [id number]       YOB: 1959        Age:  65 year old         Date: 9/12/2024           Subjective:     Patient well this morning. Seen resting this AM. No  complaints. Howard in place draining clear urine with sediment. Per PT noted patient not ambulating. Had a BM this AM.       Current Facility-Administered Medications   Medication    guaiFENesin-DM (ROBITUSSIN DM) 100-10 MG/5ML syrup 10 mL    benzonatate (TESSALON PERLES) capsule 100 mg    sodium chloride 0.9% infusion    lidocaine 2% urethral (UROJET) 2 % jelly 10 mL    tamsulosin (FLOMAX) capsule 0.4 mg    atorvastatin (LIPITOR) tablet 10 mg    oxyCODONE-acetaminophen (PERCOCET) 5-325 MG tablet 1 tablet    [Held by provider] gabapentin (NEURONTIN) capsule 400 mg    [Held by provider] aspirin (ECOTRIN) enteric coated tablet 81 mg    apixaBAN (ELIQUIS) tablet 5 mg    sodium chloride 0.9 % flush bag 25 mL    sodium chloride 0.9 % injection 2 mL    sodium chloride (NORMAL SALINE) 0.9 % bolus 500 mL    acetaminophen (TYLENOL) tablet 500 mg    Or    acetaminophen (TYLENOL) suppository 650 mg    ondansetron (ZOFRAN ODT) disintegrating tablet 4 mg    Or    ondansetron (ZOFRAN) injection 4 mg    docusate sodium-sennosides (SENOKOT S) 50-8.6 MG 2 tablet    bisacodyl (DULCOLAX) suppository 10 mg    polyethylene glycol (MIRALAX) packet 17 g    Potassium Standard Replacement Protocol (Levels 3.5 and lower)    melatonin tablet 3 mg    Potassium Replacement (Levels 3.6 - 4)    Magnesium Standard Replacement Protocol    Phosphorus Standard Replacement Protocol    dextrose 50 % injection 25 g    dextrose 50 % injection 12.5 g    glucagon (GLUCAGEN) injection 1 mg    dextrose (GLUTOSE) 40 % gel 15 g    dextrose (GLUTOSE) 40 % gel 30 g    pantoprazole (PROTONIX) EC tablet 40 mg    meropenem (MERREM) 1 g in sodium chloride 0.9 % 100 mL IVPB         Objective:   VITAL  SIGNS:  Vital Last Value 24 Hour Range   Temperature 98.4 °F (36.9 °C) (09/12/24 0751) Temp  Min: 97.7 °F (36.5 °C)  Max: 98.4 °F (36.9 °C)   Pulse 66 (09/12/24 0751) Pulse  Min: 62  Max: 66   Respiratory 16 (09/12/24 0751) Resp  Min: 16  Max: 18   Non-Invasive  Blood Pressure 121/72 (09/12/24 0751) BP  Min: 121/72  Max: 149/84   Pulse Oximetry 96 % (09/12/24 0751) SpO2  Min: 96 %  Max: 98 %     I/Os:  I/O last 3 completed shifts:  In: 475 [P.O.:475]  Out: 4400 [Urine:4400]      Physical Exam:   General: Alert, cooperative, no acute distress  Abdominal: soft, non-tender, non-distended  : Howard in place draining yellow concentrated urine   Neurologic: Alert and oriented      Lab/Data Reviewed:  Recent Labs   Lab 09/12/24 0311 09/11/24  1019 09/11/24  0308 09/10/24  1217 09/10/24  0344   SODIUM 139  --  140  --  140   POTASSIUM 4.2 3.9 3.8   < > 3.4   CHLORIDE 111*  --  111*  --  110   CO2 24  --  22  --  23   BUN 55*  --  64*  --  77*   CREATININE 2.04*  --  2.29*  --  2.47*   CALCIUM 8.1*  --  7.9*  --  8.2*   GLUCOSE 106*  --  128*  --  113*    < > = values in this interval not displayed.     Recent Labs   Lab 09/12/24 0311 09/11/24  0308 09/10/24  1505 09/10/24  0344   WBC 9.1 9.1  --  8.2   RBC 2.97* 2.80*  --  2.56*   HGB 8.0* 7.7* 8.1* 6.8*   HCT 25.1* 23.8* 25.0* 21.3*    173  --  153         Assessment/Plan     65 year old male with hx of HTN, HTN, CKD, morbid obesity, anemia of chronic disease, BPH, chronic Mobitz AV block, PVD with prior left toe amputation, MSSA bacteremia due to L2-3 epidural abscess s/p L2-S1 laminectomy on 6/10/24 s/p cefazolin x 8 weeks through 8/4/24, recently admitted 8/13 to 8/20/24 for sepsis due to ESBL Klebsiella bacteremia due to complicated UTI who presented to the ED on 9/8/2024 with abnormal labs and the following urologic issues:     Mild Bilateral Hydronephrosis  Urinary Retention   DEVIN  -Hx of large volume retention during hospitalization in 06/2024, possibly  later passed VT but unknown residuals   -Presenting with abnormal labs, found to have DEVIN   -On admission Cr 3.73, UA + infection   -Christiansen placed for 750mL in the ED  -CTAP with mild b/l hydro without obstructing stone or lesion, decompressed bladder with christiansen in place, and absent prostate  -No acute  intervention needed  -Maintain christiansen for maximal decompression  -Continue tamsulosin and finasteride  -Avoid constipation, limit narcotics and increase ambulation  -Nephrology following appreciate recs  -DEVIN improving, Cr: 2.04 today  -Future voiding trial with clinical improvement, resolution of DEVIN, increased mobility      Klebsiella UTI with Bacteremia  -Recently admitted for ESBL Klebsiella bacteremia, completed 10 days abx  -Presenting with abnormal labs, denied LUTs  -UA + large blood, LE, nitrites, and large bacteria   -Urine culture grew Klebsiella   -Blood culture grew Klebsiella   -ID following, abx per their recs    Will see w/ KATTY BauerC  [facility]  In house phone  or use [employer] to reach.   Statement Selected

## 2024-09-29 ENCOUNTER — NON-APPOINTMENT (OUTPATIENT)
Age: 54
End: 2024-09-29

## 2024-10-03 ENCOUNTER — RESULT REVIEW (OUTPATIENT)
Age: 54
End: 2024-10-03

## 2024-10-03 ENCOUNTER — APPOINTMENT (OUTPATIENT)
Dept: HEMATOLOGY ONCOLOGY | Facility: CLINIC | Age: 54
End: 2024-10-03
Payer: MEDICAID

## 2024-10-03 VITALS
TEMPERATURE: 98.1 F | HEART RATE: 57 BPM | HEIGHT: 65 IN | RESPIRATION RATE: 16 BRPM | WEIGHT: 148.19 LBS | SYSTOLIC BLOOD PRESSURE: 129 MMHG | OXYGEN SATURATION: 97 % | DIASTOLIC BLOOD PRESSURE: 78 MMHG | BODY MASS INDEX: 24.69 KG/M2

## 2024-10-03 DIAGNOSIS — C50.919 MALIGNANT NEOPLASM OF UNSPECIFIED SITE OF UNSPECIFIED FEMALE BREAST: ICD-10-CM

## 2024-10-03 DIAGNOSIS — Z17.32 MALIGNANT NEOPLASM OF UNSPECIFIED SITE OF LEFT FEMALE BREAST: ICD-10-CM

## 2024-10-03 DIAGNOSIS — C50.912 MALIGNANT NEOPLASM OF UNSPECIFIED SITE OF LEFT FEMALE BREAST: ICD-10-CM

## 2024-10-03 DIAGNOSIS — Z17.421 MALIGNANT NEOPLASM OF UNSPECIFIED SITE OF UNSPECIFIED FEMALE BREAST: ICD-10-CM

## 2024-10-03 DIAGNOSIS — C77.3 MALIGNANT NEOPLASM OF UNSPECIFIED SITE OF LEFT FEMALE BREAST: ICD-10-CM

## 2024-10-03 DIAGNOSIS — F41.9 ANXIETY DISORDER, UNSPECIFIED: ICD-10-CM

## 2024-10-03 DIAGNOSIS — F32.A ANXIETY DISORDER, UNSPECIFIED: ICD-10-CM

## 2024-10-03 DIAGNOSIS — M85.80 OTHER SPECIFIED DISORDERS OF BONE DENSITY AND STRUCTURE, UNSPECIFIED SITE: ICD-10-CM

## 2024-10-03 DIAGNOSIS — Z17.0 MALIGNANT NEOPLASM OF OVERLAPPING SITES OF LEFT FEMALE BREAST: ICD-10-CM

## 2024-10-03 DIAGNOSIS — M25.69 STIFFNESS OF OTHER SPECIFIED JOINT, NOT ELSEWHERE CLASSIFIED: ICD-10-CM

## 2024-10-03 DIAGNOSIS — C77.9 SECONDARY AND UNSPECIFIED MALIGNANT NEOPLASM OF LYMPH NODE, UNSPECIFIED: ICD-10-CM

## 2024-10-03 DIAGNOSIS — C50.812 MALIGNANT NEOPLASM OF OVERLAPPING SITES OF LEFT FEMALE BREAST: ICD-10-CM

## 2024-10-03 PROCEDURE — 99214 OFFICE O/P EST MOD 30 MIN: CPT

## 2024-10-03 NOTE — ASSESSMENT
[Curative] : Goals of care discussed with patient: Curative [Palliative Care Plan] : not applicable at this time [Patient/Caregiver not ready to engage] : Patient/Caregiver not ready to engage [FreeTextEntry1] : 55 yo premenopausal Polish woman ( fluent in English and Polish) referred by Dr. Elise for evaluation of left breast cancer. Biopsy revealed IDC ESTROGEN RECEPTOR: POSITIVE 45% NUCLEAR STAINING WITH WEAK/MODERATE 1+/2+ INTENSITY PROGESTERONE RECEPTOR: POSITIVE 30% NUCLEAR STAINING WITH MODERATE 2+ INTENSITY HER-2: NEGATIVE (0 MEMBRANOUS STAINING) KI 67 35 %.  Imaging studies with left breast mass in US 3.9 x 1.1 x 1.2 cm, MRI max dimension 5.3 cm. Right breast lesion - biopsy unsuccessful.  Patient decided on bilateral mastectomy. She understand indication for systemic chemotherapy based on lymph node status and molecular testing. 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 Reviewed protocols of chemotherapy including ACdd> Tweekly, TC and CMF with the last one with lowest incidence of hair loss. Mammaprint results c/w low recurrence score, reviewed with patient Oncotype Dx 13, distant risk of recurrence 13%, with no benefit from chemotherapy Patient underwent left sided mastectomy - pathology report reviewed, IDC 4 cm, T2, N1, with LVI. 2/22  Patient underwent right sided mastectomy and b/l placement of the expanders, her postop course was complicated by bleeding from right side.  She feels depressed and anxious - had 4 surgeries in the last 6 weeks, 1st one with anaphylactic reaction to MB, left breast mastectomy, followed by right breast mastectomy, complicated by bleeding.   Zoladex since 3/23/2022 Right breast mastectomy - pathology reviewed 3/23/2022- papilloma and ADH Continue ovarian suppression Zoladex 3/2022 > Lupron 3.75 mg q 28 days> Lupron 11.25 mg 3/2/2023, 6/23, 12/23. Start Anastrozole 1 mg PO daily- hold during radiation. S/p radiation July 13, 2022 Tx - Lupron 3.75 mg Q 4 weeks, 6/1/23 Change to Q 3 m 11.25 mg on 12/18/23 Patient underwent bilateral Maddy reconstruction on 7/20/2023 and in the tissue removed during the procedure she was found to have left internal mammary lymph node positive for micrometastatic carcinoma 1.5 mm with extranodal extension, with molecular testing indicating triple negative breast cancer. PET scan performed on August 21, 2023 showed mildly metabolic avid subcentimeter lymph nodes in the right axilla and left internal mammary chain with SUV ranging from 1.2-1.5.  AC dd q 2 weeks followed by Taxol Q 2 weeks x 4- completed 12/18/23 - referred rad onc Dr. Estrada to evaluate if there is a role for additional RT - post tx PETCT - PRABHAKAR 3/2024 - s/p Proton therapy 33 fractions.  - Continue Leuprolide - check AMH - patient has intermittent pain - weakness in LE - reviewed walking, exercise, undergoing PT - difficulty walking - pain in the foot  # Anemia - blood loss-, start PO iron, ferrous gluconate PO daily in am. Hg 11.8  # Transaminitis- repeat in 3 m  # Depressed mood-Lexapro- 10 mg. Increase to 20 mg  She has limited social support, lives by herself, father and mother ( dementia) in [place], referred to , would benefit from psychosocial support.   # Osteopenia - last bone density March 2023 T-score- 2.4 Risk factors Recommended: 1. Vitamin D 2. Calcium supplement 500mg 3. 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   Labs next visit - CBC, CMP,  # Patient h/o pituitary adenoma. Low FSH, LH, estradiol. Repeat hormones today. - end follow up    return in 12 weeks reg labs and breast tumor markers
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
## 2024-10-03 NOTE — PHYSICAL EXAM
[Fully active, able to carry on all pre-disease performance without restriction] : Status 0 - Fully active, able to carry on all pre-disease performance without restriction [Normal] : affect appropriate [de-identified] : adriel FLAP
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
## 2024-10-03 NOTE — ASSESSMENT
[Curative] : Goals of care discussed with patient: Curative [Palliative Care Plan] : not applicable at this time [Patient/Caregiver not ready to engage] : Patient/Caregiver not ready to engage [FreeTextEntry1] : 53 yo premenopausal Polish woman ( fluent in English and Polish) referred by Dr. Elise for evaluation of left breast cancer. Biopsy revealed IDC ESTROGEN RECEPTOR: POSITIVE 45% NUCLEAR STAINING WITH WEAK/MODERATE 1+/2+ INTENSITY PROGESTERONE RECEPTOR: POSITIVE 30% NUCLEAR STAINING WITH MODERATE 2+ INTENSITY HER-2: NEGATIVE (0 MEMBRANOUS STAINING) KI 67 35 %.  Imaging studies with left breast mass in US 3.9 x 1.1 x 1.2 cm, MRI max dimension 5.3 cm. Right breast lesion - biopsy unsuccessful.  Patient decided on bilateral mastectomy. She understand indication for systemic chemotherapy based on lymph node status and molecular testing. 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 Reviewed protocols of chemotherapy including ACdd> Tweekly, TC and CMF with the last one with lowest incidence of hair loss. Mammaprint results c/w low recurrence score, reviewed with patient Oncotype Dx 13, distant risk of recurrence 13%, with no benefit from chemotherapy Patient underwent left sided mastectomy - pathology report reviewed, IDC 4 cm, T2, N1, with LVI. 2/22  Patient underwent right sided mastectomy and b/l placement of the expanders, her postop course was complicated by bleeding from right side.  She feels depressed and anxious - had 4 surgeries in the last 6 weeks, 1st one with anaphylactic reaction to MB, left breast mastectomy, followed by right breast mastectomy, complicated by bleeding.   Zoladex since 3/23/2022 Right breast mastectomy - pathology reviewed 3/23/2022- papilloma and ADH Continue ovarian suppression Zoladex 3/2022 > Lupron 3.75 mg q 28 days> Lupron 11.25 mg 3/2/2023, 6/23, 12/23. Start Anastrozole 1 mg PO daily- hold during radiation. S/p radiation July 13, 2022 Tx - Lupron 3.75 mg Q 4 weeks, 6/1/23 Change to Q 3 m 11.25 mg on 12/18/23 Patient underwent bilateral Maddy reconstruction on 7/20/2023 and in the tissue removed during the procedure she was found to have left internal mammary lymph node positive for micrometastatic carcinoma 1.5 mm with extranodal extension, with molecular testing indicating triple negative breast cancer. PET scan performed on August 21, 2023 showed mildly metabolic avid subcentimeter lymph nodes in the right axilla and left internal mammary chain with SUV ranging from 1.2-1.5.  AC dd q 2 weeks followed by Taxol Q 2 weeks x 4- completed 12/18/23 - referred rad onc Dr. Estrada to evaluate if there is a role for additional RT - post tx PETCT - PRABHAKAR 3/2024 - s/p Proton therapy 33 fractions.  - Continue Leuprolide - check AMH - patient has intermittent pain - weakness in LE - reviewed walking, exercise, undergoing PT - difficulty walking - pain in the foot  # Anemia - blood loss-, start PO iron, ferrous gluconate PO daily in am. Hg 11.8  # Transaminitis- repeat in 3 m  # Depressed mood-Lexapro- 10 mg. Increase to 20 mg  She has limited social support, lives by herself, father and mother ( dementia) in [place], referred to , would benefit from psychosocial support.   # Osteopenia - last bone density March 2023 T-score- 2.4 Risk factors Recommended: 1. Vitamin D 2. Calcium supplement 500mg 3. 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   Labs next visit - CBC, CMP,  # Patient h/o pituitary adenoma. Low FSH, LH, estradiol. Repeat hormones today. - end follow up    return in 12 weeks reg labs and breast tumor markers

## 2024-10-03 NOTE — PHYSICAL EXAM
[Fully active, able to carry on all pre-disease performance without restriction] : Status 0 - Fully active, able to carry on all pre-disease performance without restriction [Normal] : affect appropriate [de-identified] : adriel FLAP

## 2024-10-03 NOTE — PHYSICAL EXAM
[Fully active, able to carry on all pre-disease performance without restriction] : Status 0 - Fully active, able to carry on all pre-disease performance without restriction [Normal] : affect appropriate [de-identified] : adriel FLAP

## 2024-10-03 NOTE — REVIEW OF SYSTEMS
[Fatigue] : fatigue [Constipation] : constipation [de-identified] : chest area post radiation [Night Sweats] : night sweats [Joint Pain] : joint pain [Joint Stiffness] : joint stiffness [Hot Flashes] : hot flashes [Negative] : Integumentary [Skin Rash] : no skin rash [FreeTextEntry7] : intertmittent

## 2024-10-03 NOTE — ASSESSMENT
[Curative] : Goals of care discussed with patient: Curative [Palliative Care Plan] : not applicable at this time [Patient/Caregiver not ready to engage] : Patient/Caregiver not ready to engage [FreeTextEntry1] : 53 yo premenopausal Polish woman ( fluent in English and Polish) referred by Dr. Elise for evaluation of left breast cancer. Biopsy revealed IDC ESTROGEN RECEPTOR: POSITIVE 45% NUCLEAR STAINING WITH WEAK/MODERATE 1+/2+ INTENSITY PROGESTERONE RECEPTOR: POSITIVE 30% NUCLEAR STAINING WITH MODERATE 2+ INTENSITY HER-2: NEGATIVE (0 MEMBRANOUS STAINING) KI 67 35 %.  Imaging studies with left breast mass in US 3.9 x 1.1 x 1.2 cm, MRI max dimension 5.3 cm. Right breast lesion - biopsy unsuccessful.  Patient decided on bilateral mastectomy. She understand indication for systemic chemotherapy based on lymph node status and molecular testing. 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 Reviewed protocols of chemotherapy including ACdd> Tweekly, TC and CMF with the last one with lowest incidence of hair loss. Mammaprint results c/w low recurrence score, reviewed with patient Oncotype Dx 13, distant risk of recurrence 13%, with no benefit from chemotherapy Patient underwent left sided mastectomy - pathology report reviewed, IDC 4 cm, T2, N1, with LVI. 2/22  Patient underwent right sided mastectomy and b/l placement of the expanders, her postop course was complicated by bleeding from right side.  She feels depressed and anxious - had 4 surgeries in the last 6 weeks, 1st one with anaphylactic reaction to MB, left breast mastectomy, followed by right breast mastectomy, complicated by bleeding.   Zoladex since 3/23/2022 Right breast mastectomy - pathology reviewed 3/23/2022- papilloma and ADH Continue ovarian suppression Zoladex 3/2022 > Lupron 3.75 mg q 28 days> Lupron 11.25 mg 3/2/2023, 6/23, 12/23. Start Anastrozole 1 mg PO daily- hold during radiation. S/p radiation July 13, 2022 Tx - Lupron 3.75 mg Q 4 weeks, 6/1/23 Change to Q 3 m 11.25 mg on 12/18/23 Patient underwent bilateral Maddy reconstruction on 7/20/2023 and in the tissue removed during the procedure she was found to have left internal mammary lymph node positive for micrometastatic carcinoma 1.5 mm with extranodal extension, with molecular testing indicating triple negative breast cancer. PET scan performed on August 21, 2023 showed mildly metabolic avid subcentimeter lymph nodes in the right axilla and left internal mammary chain with SUV ranging from 1.2-1.5.  AC dd q 2 weeks followed by Taxol Q 2 weeks x 4- completed 12/18/23 - referred rad onc Dr. Estrada to evaluate if there is a role for additional RT - post tx PETCT - PRABHAKAR 3/2024 - s/p Proton therapy 33 fractions.  - Continue Leuprolide - check AMH - patient has intermittent pain - weakness in LE - reviewed walking, exercise, undergoing PT - difficulty walking - pain in the foot  # Anemia - blood loss-, start PO iron, ferrous gluconate PO daily in am. Hg 11.8  # Transaminitis- repeat in 3 m  # Depressed mood-Lexapro- 10 mg. Increase to 20 mg  She has limited social support, lives by herself, father and mother ( dementia) in [place], referred to , would benefit from psychosocial support.   # Osteopenia - last bone density March 2023 T-score- 2.4 Risk factors Recommended: 1. Vitamin D 2. Calcium supplement 500mg 3. 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   Labs next visit - CBC, CMP,  # Patient h/o pituitary adenoma. Low FSH, LH, estradiol. Repeat hormones today. - end follow up    return in 12 weeks reg labs and breast tumor markers

## 2024-10-03 NOTE — ASSESSMENT
[Curative] : Goals of care discussed with patient: Curative [Palliative Care Plan] : not applicable at this time [Patient/Caregiver not ready to engage] : Patient/Caregiver not ready to engage [FreeTextEntry1] : 53 yo premenopausal Polish woman ( fluent in English and Polish) referred by Dr. Elise for evaluation of left breast cancer. Biopsy revealed IDC ESTROGEN RECEPTOR: POSITIVE 45% NUCLEAR STAINING WITH WEAK/MODERATE 1+/2+ INTENSITY PROGESTERONE RECEPTOR: POSITIVE 30% NUCLEAR STAINING WITH MODERATE 2+ INTENSITY HER-2: NEGATIVE (0 MEMBRANOUS STAINING) KI 67 35 %.  Imaging studies with left breast mass in US 3.9 x 1.1 x 1.2 cm, MRI max dimension 5.3 cm. Right breast lesion - biopsy unsuccessful.  Patient decided on bilateral mastectomy. She understand indication for systemic chemotherapy based on lymph node status and molecular testing. 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 Reviewed protocols of chemotherapy including ACdd> Tweekly, TC and CMF with the last one with lowest incidence of hair loss. Mammaprint results c/w low recurrence score, reviewed with patient Oncotype Dx 13, distant risk of recurrence 13%, with no benefit from chemotherapy Patient underwent left sided mastectomy - pathology report reviewed, IDC 4 cm, T2, N1, with LVI. 2/22  Patient underwent right sided mastectomy and b/l placement of the expanders, her postop course was complicated by bleeding from right side.  She feels depressed and anxious - had 4 surgeries in the last 6 weeks, 1st one with anaphylactic reaction to MB, left breast mastectomy, followed by right breast mastectomy, complicated by bleeding.   Zoladex since 3/23/2022 Right breast mastectomy - pathology reviewed 3/23/2022- papilloma and ADH Continue ovarian suppression Zoladex 3/2022 > Lupron 3.75 mg q 28 days> Lupron 11.25 mg 3/2/2023, 6/23, 12/23. Start Anastrozole 1 mg PO daily- hold during radiation. S/p radiation July 13, 2022 Tx - Lupron 3.75 mg Q 4 weeks, 6/1/23 Change to Q 3 m 11.25 mg on 12/18/23 Patient underwent bilateral Maddy reconstruction on 7/20/2023 and in the tissue removed during the procedure she was found to have left internal mammary lymph node positive for micrometastatic carcinoma 1.5 mm with extranodal extension, with molecular testing indicating triple negative breast cancer. PET scan performed on August 21, 2023 showed mildly metabolic avid subcentimeter lymph nodes in the right axilla and left internal mammary chain with SUV ranging from 1.2-1.5.  AC dd q 2 weeks followed by Taxol Q 2 weeks x 4- completed 12/18/23 - referred rad onc Dr. Estrada to evaluate if there is a role for additional RT - post tx PETCT - PRABHAKAR 3/2024 - s/p Proton therapy 33 fractions.  - Continue Leuprolide - check AMH - patient has intermittent pain - weakness in LE - reviewed walking, exercise, undergoing PT - difficulty walking - pain in the foot  # Anemia - blood loss-, start PO iron, ferrous gluconate PO daily in am. Hg 11.8  # Transaminitis- repeat in 3 m  # Depressed mood-Lexapro- 10 mg. Increase to 20 mg  She has limited social support, lives by herself, father and mother ( dementia) in [place], referred to , would benefit from psychosocial support.   # Osteopenia - last bone density March 2023 T-score- 2.4 Risk factors Recommended: 1. Vitamin D 2. Calcium supplement 500mg 3. 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   Labs next visit - CBC, CMP,  # Patient h/o pituitary adenoma. Low FSH, LH, estradiol. Repeat hormones today. - end follow up    return in 12 weeks reg labs and breast tumor markers

## 2024-10-03 NOTE — BEGINNING OF VISIT
[0] : 2) Feeling down, depressed, or hopeless: Not at all (0) [PHQ-2 Negative] : PHQ-2 Negative [Pain Scale: ___] : On a scale of 1-10, today the patient's pain is a(n) [unfilled]. [Future Reassessment of Pain Scale] : Future reassessment of pain scale [Never] : Never [Date Discussed (MM/DD/YY): ___] : Discussed: [unfilled] [Patient/Caregiver unclear of wishes] : Patient/Caregiver unclear of wishes [[id number]] : 0

## 2024-10-03 NOTE — PHYSICAL EXAM
[Fully active, able to carry on all pre-disease performance without restriction] : Status 0 - Fully active, able to carry on all pre-disease performance without restriction [Normal] : affect appropriate [de-identified] : adriel FLAP

## 2024-10-03 NOTE — REVIEW OF SYSTEMS
[Fatigue] : fatigue [Constipation] : constipation [de-identified] : chest area post radiation [Night Sweats] : night sweats [Joint Pain] : joint pain [Joint Stiffness] : joint stiffness [Hot Flashes] : hot flashes [Negative] : Integumentary [Skin Rash] : no skin rash [FreeTextEntry7] : intertmittent

## 2024-10-03 NOTE — PHYSICAL EXAM
[Fully active, able to carry on all pre-disease performance without restriction] : Status 0 - Fully active, able to carry on all pre-disease performance without restriction [Normal] : affect appropriate [de-identified] : adriel FLAP

## 2024-10-03 NOTE — REVIEW OF SYSTEMS
[Fatigue] : fatigue [Constipation] : constipation [de-identified] : chest area post radiation [Night Sweats] : night sweats [Joint Pain] : joint pain [Joint Stiffness] : joint stiffness [Hot Flashes] : hot flashes [Negative] : Integumentary [Skin Rash] : no skin rash [FreeTextEntry7] : intertmittent

## 2024-10-03 NOTE — BEGINNING OF VISIT
[0] : 2) Feeling down, depressed, or hopeless: Not at all (0) [PHQ-2 Negative] : PHQ-2 Negative [Pain Scale: ___] : On a scale of 1-10, today the patient's pain is a(n) [unfilled]. [Future Reassessment of Pain Scale] : Future reassessment of pain scale [Never] : Never [Date Discussed (MM/DD/YY): ___] : Discussed: [unfilled] [Patient/Caregiver unclear of wishes] : Patient/Caregiver unclear of wishes [[id number]] : 0
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
## 2024-10-03 NOTE — REVIEW OF SYSTEMS
[Fatigue] : fatigue [Constipation] : constipation [de-identified] : chest area post radiation [Night Sweats] : night sweats [Joint Pain] : joint pain [Joint Stiffness] : joint stiffness [Hot Flashes] : hot flashes [Negative] : Integumentary [Skin Rash] : no skin rash [FreeTextEntry7] : intertmittent

## 2024-10-03 NOTE — ASSESSMENT
[Curative] : Goals of care discussed with patient: Curative [Palliative Care Plan] : not applicable at this time [Patient/Caregiver not ready to engage] : Patient/Caregiver not ready to engage [FreeTextEntry1] : 53 yo premenopausal Polish woman ( fluent in English and Polish) referred by Dr. Elise for evaluation of left breast cancer. Biopsy revealed IDC ESTROGEN RECEPTOR: POSITIVE 45% NUCLEAR STAINING WITH WEAK/MODERATE 1+/2+ INTENSITY PROGESTERONE RECEPTOR: POSITIVE 30% NUCLEAR STAINING WITH MODERATE 2+ INTENSITY HER-2: NEGATIVE (0 MEMBRANOUS STAINING) KI 67 35 %.  Imaging studies with left breast mass in US 3.9 x 1.1 x 1.2 cm, MRI max dimension 5.3 cm. Right breast lesion - biopsy unsuccessful.  Patient decided on bilateral mastectomy. She understand indication for systemic chemotherapy based on lymph node status and molecular testing. 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 Reviewed protocols of chemotherapy including ACdd> Tweekly, TC and CMF with the last one with lowest incidence of hair loss. Mammaprint results c/w low recurrence score, reviewed with patient Oncotype Dx 13, distant risk of recurrence 13%, with no benefit from chemotherapy Patient underwent left sided mastectomy - pathology report reviewed, IDC 4 cm, T2, N1, with LVI. 2/22  Patient underwent right sided mastectomy and b/l placement of the expanders, her postop course was complicated by bleeding from right side.  She feels depressed and anxious - had 4 surgeries in the last 6 weeks, 1st one with anaphylactic reaction to MB, left breast mastectomy, followed by right breast mastectomy, complicated by bleeding.   Zoladex since 3/23/2022 Right breast mastectomy - pathology reviewed 3/23/2022- papilloma and ADH Continue ovarian suppression Zoladex 3/2022 > Lupron 3.75 mg q 28 days> Lupron 11.25 mg 3/2/2023, 6/23, 12/23. Start Anastrozole 1 mg PO daily- hold during radiation. S/p radiation July 13, 2022 Tx - Lupron 3.75 mg Q 4 weeks, 6/1/23 Change to Q 3 m 11.25 mg on 12/18/23 Patient underwent bilateral Maddy reconstruction on 7/20/2023 and in the tissue removed during the procedure she was found to have left internal mammary lymph node positive for micrometastatic carcinoma 1.5 mm with extranodal extension, with molecular testing indicating triple negative breast cancer. PET scan performed on August 21, 2023 showed mildly metabolic avid subcentimeter lymph nodes in the right axilla and left internal mammary chain with SUV ranging from 1.2-1.5.  AC dd q 2 weeks followed by Taxol Q 2 weeks x 4- completed 12/18/23 - referred rad onc Dr. Estrada to evaluate if there is a role for additional RT - post tx PETCT - PRABHAKAR 3/2024 - s/p Proton therapy 33 fractions.  - Continue Leuprolide - check AMH - patient has intermittent pain - weakness in LE - reviewed walking, exercise, undergoing PT - difficulty walking - pain in the foot  # Anemia - blood loss-, start PO iron, ferrous gluconate PO daily in am. Hg 11.8  # Transaminitis- repeat in 3 m  # Depressed mood-Lexapro- 10 mg. Increase to 20 mg  She has limited social support, lives by herself, father and mother ( dementia) in [place], referred to , would benefit from psychosocial support.   # Osteopenia - last bone density March 2023 T-score- 2.4 Risk factors Recommended: 1. Vitamin D 2. Calcium supplement 500mg 3. 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   Labs next visit - CBC, CMP,  # Patient h/o pituitary adenoma. Low FSH, LH, estradiol. Repeat hormones today. - end follow up    return in 12 weeks reg labs and breast tumor markers
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
## 2024-10-03 NOTE — BEGINNING OF VISIT
[0] : 2) Feeling down, depressed, or hopeless: Not at all (0) [PHQ-2 Negative] : PHQ-2 Negative [Pain Scale: ___] : On a scale of 1-10, today the patient's pain is a(n) [unfilled]. [Future Reassessment of Pain Scale] : Future reassessment of pain scale [Never] : Never [Date Discussed (MM/DD/YY): ___] : Discussed: [unfilled] [Patient/Caregiver unclear of wishes] : Patient/Caregiver unclear of wishes [[id number]] : 0

## 2024-10-03 NOTE — ASSESSMENT
[Curative] : Goals of care discussed with patient: Curative [Palliative Care Plan] : not applicable at this time [Patient/Caregiver not ready to engage] : Patient/Caregiver not ready to engage [FreeTextEntry1] : 53 yo premenopausal Polish woman ( fluent in English and Polish) referred by Dr. Elise for evaluation of left breast cancer. Biopsy revealed IDC ESTROGEN RECEPTOR: POSITIVE 45% NUCLEAR STAINING WITH WEAK/MODERATE 1+/2+ INTENSITY PROGESTERONE RECEPTOR: POSITIVE 30% NUCLEAR STAINING WITH MODERATE 2+ INTENSITY HER-2: NEGATIVE (0 MEMBRANOUS STAINING) KI 67 35 %.  Imaging studies with left breast mass in US 3.9 x 1.1 x 1.2 cm, MRI max dimension 5.3 cm. Right breast lesion - biopsy unsuccessful.  Patient decided on bilateral mastectomy. She understand indication for systemic chemotherapy based on lymph node status and molecular testing. 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 Reviewed protocols of chemotherapy including ACdd> Tweekly, TC and CMF with the last one with lowest incidence of hair loss. Mammaprint results c/w low recurrence score, reviewed with patient Oncotype Dx 13, distant risk of recurrence 13%, with no benefit from chemotherapy Patient underwent left sided mastectomy - pathology report reviewed, IDC 4 cm, T2, N1, with LVI. 2/22  Patient underwent right sided mastectomy and b/l placement of the expanders, her postop course was complicated by bleeding from right side.  She feels depressed and anxious - had 4 surgeries in the last 6 weeks, 1st one with anaphylactic reaction to MB, left breast mastectomy, followed by right breast mastectomy, complicated by bleeding.   Zoladex since 3/23/2022 Right breast mastectomy - pathology reviewed 3/23/2022- papilloma and ADH Continue ovarian suppression Zoladex 3/2022 > Lupron 3.75 mg q 28 days> Lupron 11.25 mg 3/2/2023, 6/23, 12/23. Start Anastrozole 1 mg PO daily- hold during radiation. S/p radiation July 13, 2022 Tx - Lupron 3.75 mg Q 4 weeks, 6/1/23 Change to Q 3 m 11.25 mg on 12/18/23 Patient underwent bilateral Maddy reconstruction on 7/20/2023 and in the tissue removed during the procedure she was found to have left internal mammary lymph node positive for micrometastatic carcinoma 1.5 mm with extranodal extension, with molecular testing indicating triple negative breast cancer. PET scan performed on August 21, 2023 showed mildly metabolic avid subcentimeter lymph nodes in the right axilla and left internal mammary chain with SUV ranging from 1.2-1.5.  AC dd q 2 weeks followed by Taxol Q 2 weeks x 4- completed 12/18/23 - referred rad onc Dr. Estrada to evaluate if there is a role for additional RT - post tx PETCT - PRABHAKAR 3/2024 - s/p Proton therapy 33 fractions.  - Continue Leuprolide - check AMH - patient has intermittent pain - weakness in LE - reviewed walking, exercise, undergoing PT - difficulty walking - pain in the foot  # Anemia - blood loss-, start PO iron, ferrous gluconate PO daily in am. Hg 11.8  # Transaminitis- repeat in 3 m  # Depressed mood-Lexapro- 10 mg. Increase to 20 mg  She has limited social support, lives by herself, father and mother ( dementia) in [place], referred to , would benefit from psychosocial support.   # Osteopenia - last bone density March 2023 T-score- 2.4 Risk factors Recommended: 1. Vitamin D 2. Calcium supplement 500mg 3. 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   Labs next visit - CBC, CMP,  # Patient h/o pituitary adenoma. Low FSH, LH, estradiol. Repeat hormones today. - end follow up    return in 12 weeks reg labs and breast tumor markers

## 2024-10-03 NOTE — REVIEW OF SYSTEMS
[Fatigue] : fatigue [Constipation] : constipation [de-identified] : chest area post radiation [Night Sweats] : night sweats [Joint Pain] : joint pain [Joint Stiffness] : joint stiffness [Hot Flashes] : hot flashes [Negative] : Integumentary [Skin Rash] : no skin rash [FreeTextEntry7] : intertmittent

## 2024-10-03 NOTE — BEGINNING OF VISIT
[0] : 2) Feeling down, depressed, or hopeless: Not at all (0) [PHQ-2 Negative] : PHQ-2 Negative [Pain Scale: ___] : On a scale of 1-10, today the patient's pain is a(n) [unfilled]. [Future Reassessment of Pain Scale] : Future reassessment of pain scale [Never] : Never [Date Discussed (MM/DD/YY): ___] : Discussed: [unfilled] [Patient/Caregiver unclear of wishes] : Patient/Caregiver unclear of wishes [[id number]] : 0

## 2024-10-03 NOTE — PHYSICAL EXAM
[Fully active, able to carry on all pre-disease performance without restriction] : Status 0 - Fully active, able to carry on all pre-disease performance without restriction [Normal] : affect appropriate [de-identified] : adriel FLAP

## 2024-10-03 NOTE — BEGINNING OF VISIT
[0] : 2) Feeling down, depressed, or hopeless: Not at all (0) [PHQ-2 Negative] : PHQ-2 Negative [Pain Scale: ___] : On a scale of 1-10, today the patient's pain is a(n) [unfilled]. [Future Reassessment of Pain Scale] : Future reassessment of pain scale [Never] : Never [Date Discussed (MM/DD/YY): ___] : Discussed: [unfilled] [Patient/Caregiver unclear of wishes] : Patient/Caregiver unclear of wishes [[id number]] : 0

## 2024-10-03 NOTE — BEGINNING OF VISIT
[0] : 2) Feeling down, depressed, or hopeless: Not at all (0) [PHQ-2 Negative] : PHQ-2 Negative [Pain Scale: ___] : On a scale of 1-10, today the patient's pain is a(n) [unfilled]. [Future Reassessment of Pain Scale] : Future reassessment of pain scale [Never] : Never [Date Discussed (MM/DD/YY): ___] : Discussed: [unfilled] [Patient/Caregiver unclear of wishes] : Patient/Caregiver unclear of wishes [[id number]] : 0

## 2024-10-03 NOTE — REVIEW OF SYSTEMS
[Fatigue] : fatigue [Constipation] : constipation [de-identified] : chest area post radiation [Night Sweats] : night sweats [Joint Pain] : joint pain [Joint Stiffness] : joint stiffness [Hot Flashes] : hot flashes [Negative] : Integumentary [Skin Rash] : no skin rash [FreeTextEntry7] : intertmittent
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
## 2024-10-08 ENCOUNTER — NON-APPOINTMENT (OUTPATIENT)
Age: 54
End: 2024-10-08

## 2024-10-08 NOTE — ASU DISCHARGE PLAN (ADULT/PEDIATRIC) - NS MD DC FALL RISK RISK
For information on Fall & Injury Prevention, visit: [email or web address] OR  [email or web address]://[email or web address] OR  https://www.cdc.gov/steadi/patient.html Negative

## 2024-10-10 ENCOUNTER — NON-APPOINTMENT (OUTPATIENT)
Age: 54
End: 2024-10-10

## 2024-10-11 ENCOUNTER — NON-APPOINTMENT (OUTPATIENT)
Age: 54
End: 2024-10-11

## 2024-10-15 ENCOUNTER — NON-APPOINTMENT (OUTPATIENT)
Age: 54
End: 2024-10-15

## 2024-10-16 ENCOUNTER — NON-APPOINTMENT (OUTPATIENT)
Age: 54
End: 2024-10-16

## 2024-10-22 ENCOUNTER — NON-APPOINTMENT (OUTPATIENT)
Age: 54
End: 2024-10-22

## 2024-10-22 ENCOUNTER — APPOINTMENT (OUTPATIENT)
Dept: ORTHOPEDIC SURGERY | Facility: CLINIC | Age: 54
End: 2024-10-22
Payer: MEDICAID

## 2024-10-22 DIAGNOSIS — G62.0 DRUG-INDUCED POLYNEUROPATHY: ICD-10-CM

## 2024-10-22 DIAGNOSIS — M20.21 HALLUX RIGIDUS, RIGHT FOOT: ICD-10-CM

## 2024-10-22 DIAGNOSIS — T45.1X5A DRUG-INDUCED POLYNEUROPATHY: ICD-10-CM

## 2024-10-22 DIAGNOSIS — M62.461 CONTRACTURE OF MUSCLE, RIGHT LOWER LEG: ICD-10-CM

## 2024-10-22 DIAGNOSIS — M79.671 PAIN IN RIGHT FOOT: ICD-10-CM

## 2024-10-22 PROCEDURE — 99214 OFFICE O/P EST MOD 30 MIN: CPT

## 2024-10-22 PROCEDURE — 73630 X-RAY EXAM OF FOOT: CPT | Mod: RT

## 2024-10-24 ENCOUNTER — APPOINTMENT (OUTPATIENT)
Dept: ORTHOPEDIC SURGERY | Facility: CLINIC | Age: 54
End: 2024-10-24
Payer: MEDICAID

## 2024-10-24 DIAGNOSIS — M65.939 UNSPECIFIED SYNOVITIS AND TENOSYNOVITIS, UNSPECIFIED FOREARM: ICD-10-CM

## 2024-10-24 PROCEDURE — 99203 OFFICE O/P NEW LOW 30 MIN: CPT

## 2024-10-25 ENCOUNTER — NON-APPOINTMENT (OUTPATIENT)
Age: 54
End: 2024-10-25

## 2024-10-28 ENCOUNTER — NON-APPOINTMENT (OUTPATIENT)
Age: 54
End: 2024-10-28

## 2024-10-28 ENCOUNTER — APPOINTMENT (OUTPATIENT)
Dept: PLASTIC SURGERY | Facility: CLINIC | Age: 54
End: 2024-10-28
Payer: MEDICAID

## 2024-10-28 VITALS — BODY MASS INDEX: 24.99 KG/M2 | HEIGHT: 65 IN | OXYGEN SATURATION: 98 % | WEIGHT: 150 LBS | HEART RATE: 77 BPM

## 2024-10-28 DIAGNOSIS — Z42.1 ENCOUNTER FOR BREAST RECONSTRUCTION FOLLOWING MASTECTOMY: ICD-10-CM

## 2024-10-28 PROCEDURE — 99213 OFFICE O/P EST LOW 20 MIN: CPT

## 2024-10-31 ENCOUNTER — NON-APPOINTMENT (OUTPATIENT)
Age: 54
End: 2024-10-31

## 2024-11-01 ENCOUNTER — OUTPATIENT (OUTPATIENT)
Dept: OUTPATIENT SERVICES | Facility: HOSPITAL | Age: 54
LOS: 1 days | End: 2024-11-01
Payer: MEDICAID

## 2024-11-01 VITALS
HEART RATE: 60 BPM | HEIGHT: 71 IN | RESPIRATION RATE: 18 BRPM | WEIGHT: 151.02 LBS | DIASTOLIC BLOOD PRESSURE: 90 MMHG | SYSTOLIC BLOOD PRESSURE: 143 MMHG | OXYGEN SATURATION: 100 % | TEMPERATURE: 98 F

## 2024-11-01 DIAGNOSIS — Z98.890 OTHER SPECIFIED POSTPROCEDURAL STATES: Chronic | ICD-10-CM

## 2024-11-01 DIAGNOSIS — N88.8 OTHER SPECIFIED NONINFLAMMATORY DISORDERS OF CERVIX UTERI: Chronic | ICD-10-CM

## 2024-11-01 DIAGNOSIS — Z01.818 ENCOUNTER FOR OTHER PREPROCEDURAL EXAMINATION: ICD-10-CM

## 2024-11-01 DIAGNOSIS — M20.21 HALLUX RIGIDUS, RIGHT FOOT: ICD-10-CM

## 2024-11-01 DIAGNOSIS — Z98.82 BREAST IMPLANT STATUS: Chronic | ICD-10-CM

## 2024-11-01 DIAGNOSIS — Z90.13 ACQUIRED ABSENCE OF BILATERAL BREASTS AND NIPPLES: Chronic | ICD-10-CM

## 2024-11-01 LAB
ALBUMIN SERPL ELPH-MCNC: 4.3 G/DL — SIGNIFICANT CHANGE UP (ref 3.3–5)
ALP SERPL-CCNC: 107 U/L — SIGNIFICANT CHANGE UP (ref 40–120)
ALT FLD-CCNC: 47 U/L — HIGH (ref 10–45)
ANION GAP SERPL CALC-SCNC: 11 MMOL/L — SIGNIFICANT CHANGE UP (ref 5–17)
AST SERPL-CCNC: 38 U/L — SIGNIFICANT CHANGE UP (ref 10–40)
BILIRUB SERPL-MCNC: 0.2 MG/DL — SIGNIFICANT CHANGE UP (ref 0.2–1.2)
BUN SERPL-MCNC: 12 MG/DL — SIGNIFICANT CHANGE UP (ref 7–23)
CALCIUM SERPL-MCNC: 9.3 MG/DL — SIGNIFICANT CHANGE UP (ref 8.4–10.5)
CHLORIDE SERPL-SCNC: 103 MMOL/L — SIGNIFICANT CHANGE UP (ref 96–108)
CO2 SERPL-SCNC: 25 MMOL/L — SIGNIFICANT CHANGE UP (ref 22–31)
CREAT SERPL-MCNC: 0.51 MG/DL — SIGNIFICANT CHANGE UP (ref 0.5–1.3)
EGFR: 110 ML/MIN/1.73M2 — SIGNIFICANT CHANGE UP
GLUCOSE SERPL-MCNC: 90 MG/DL — SIGNIFICANT CHANGE UP (ref 70–99)
HCT VFR BLD CALC: 40.7 % — SIGNIFICANT CHANGE UP (ref 34.5–45)
HGB BLD-MCNC: 13.1 G/DL — SIGNIFICANT CHANGE UP (ref 11.5–15.5)
MCHC RBC-ENTMCNC: 30.6 PG — SIGNIFICANT CHANGE UP (ref 27–34)
MCHC RBC-ENTMCNC: 32.2 G/DL — SIGNIFICANT CHANGE UP (ref 32–36)
MCV RBC AUTO: 95.1 FL — SIGNIFICANT CHANGE UP (ref 80–100)
NRBC # BLD: 0 /100 WBCS — SIGNIFICANT CHANGE UP (ref 0–0)
PLATELET # BLD AUTO: 223 K/UL — SIGNIFICANT CHANGE UP (ref 150–400)
POTASSIUM SERPL-MCNC: 3.7 MMOL/L — SIGNIFICANT CHANGE UP (ref 3.5–5.3)
POTASSIUM SERPL-SCNC: 3.7 MMOL/L — SIGNIFICANT CHANGE UP (ref 3.5–5.3)
PROT SERPL-MCNC: 7.6 G/DL — SIGNIFICANT CHANGE UP (ref 6–8.3)
RBC # BLD: 4.28 M/UL — SIGNIFICANT CHANGE UP (ref 3.8–5.2)
RBC # FLD: 12.5 % — SIGNIFICANT CHANGE UP (ref 10.3–14.5)
SODIUM SERPL-SCNC: 139 MMOL/L — SIGNIFICANT CHANGE UP (ref 135–145)
WBC # BLD: 5.78 K/UL — SIGNIFICANT CHANGE UP (ref 3.8–10.5)
WBC # FLD AUTO: 5.78 K/UL — SIGNIFICANT CHANGE UP (ref 3.8–10.5)

## 2024-11-01 PROCEDURE — 85027 COMPLETE CBC AUTOMATED: CPT

## 2024-11-01 PROCEDURE — G0463: CPT

## 2024-11-01 PROCEDURE — 80053 COMPREHEN METABOLIC PANEL: CPT

## 2024-11-01 NOTE — H&P PST ADULT - ASSESSMENT
DASI score: 6.45 mets   DASI activity: 30 min walks daily, climb 2 flights, carry groceries, dancing  Loose teeth or denture: no     MP  DASI score: 6.45 mets   DASI activity: 30 min walks daily, climb 2 flights, carry groceries, dancing  Loose teeth or denture: no     MP 2

## 2024-11-01 NOTE — H&P PST ADULT - PROBLEM SELECTOR PLAN 1
Cheilectomy RIGHT Hallux on 11/15/24.   Pre-op instructions provided and questions answered. Pt verbalized understanding.

## 2024-11-01 NOTE — H&P PST ADULT - ADMIT DATE
Cardiac Catheterization Appropriate Use    History of Heart Failure? []  Yes  [x]  No     Newly Diagnosed? []  Yes  [x]  No     NYHA class    [] I  [] II  [] III  [] IV            Stress Test Peformed * Important for AUC criteria []  Yes  [x]  No         If yes, specify test performed and must include risk of ischemia      StressTest Type   Test Result   Risk of Ischemia   [] Standard Exercise ST             (without imaging)  [] Stress Echo  [] ST Nuclear   [] ST with CMR    [] Positive                   []Negative  [] Indeterminate  [] Unavailable [] High   [] Intermediate  [] Low  [] Unavailable        Indication(s) for Cath Lab Visit  (select all that apply)    [] ACS  [x] New onset angina<=2 months  [] Worsening Angina  [] Resuscitated Cardiac Arrest   [] Stable Known CAD  [] Suspected CAD  [] Valvular Disease  [] Pericardial Disease  [] Pre-Operative Evaluation  []  Syncope []Post-Cardiac Transplant  []Cardiac Arrhythmias   [] Cardiomyopathy  [] LV dysfunction  [] Evaluation for Exercise Clearance  [] Other         Chest Pain Symptoms     [x] Typical Angina      []   Atypical                                Angina      [] Non-anginal Chest Pain []Asymptomatic      Cardiovascular Instability  [] Yes  [x]  No         If yes, specify instability type  (select all that apply)    [] Persistent Ischemic                     Symptoms(chest pain)   [] Hemodynamic Instability(not        Cardiogenic shock)  [] Cardiogenic Shock  [] Refractory Cardiogenic Shock   [] Acute Heart Failure Symptoms  [] Ventricular Arrhythmia        Evaluation for Surgery []  Cardiac Surgery        []  Non-Cardiac Surgery      Functional Capacity []<4 METS  []>= 4 METS without symptoms  []  >=4 mets with symptoms    []  Unknown     Surgical Risk []  Low     []  Intermediate    []High Risk Vascular  []  High Risk Non-Vascular                         01-Nov-2024

## 2024-11-01 NOTE — H&P PST ADULT - HISTORY OF PRESENT ILLNESS
53 y/o Female with PMHx significant for Breast Cancer s/p B/L mastectomy and adjuvant Chemo and RT (finished 6/27/24), Anxiety who reports a several year history of right foot pain  53 y/o Female with PMHx significant for Breast Cancer s/p B/L mastectomy and adjuvant Chemo and RT (finished 6/27/24) and Anxiety who reports a several year history of progressively worsened right foot pain. She is now scheduled for Cheilectomy RIGHT Hallux on 11/15/24. She denies CP, palps, SOB, dizziness, fever or chills.  53 y/o Female with PMHx significant for Breast Cancer s/p B/L mastectomy and breast reconstruction surgery, adjuvant Chemo and RT (finished 6/27/24) and Anxiety who reports a several year history of progressively worsened right foot pain (8/10). She is now scheduled for Cheilectomy RIGHT Hallux on 11/15/24. She denies CP, palps, SOB, dizziness, fever or chills.  53 y/o Female with PMHx significant for Breast Cancer s/p B/L mastectomy and multiple breast reconstruction surgeries, adjuvant Chemo and RT (finished 6/27/24) and Anxiety who reports a several year history of progressively worsened right foot pain (8/10). She is now scheduled for Cheilectomy RIGHT Hallux on 11/15/24. She denies CP, palps, SOB, dizziness, fever or chills.

## 2024-11-01 NOTE — H&P PST ADULT - NSICDXPASTSURGICALHX_GEN_ALL_CORE_FT
PAST SURGICAL HISTORY:  Cervical mass removal    H/O bilateral mastectomy     H/O breast implant bilat    S/P lymph node biopsy right     PAST SURGICAL HISTORY:  Cervical mass removal    H/O bilateral mastectomy     H/O breast implant bilat    H/O breast reconstruction     S/P lymph node biopsy right

## 2024-11-01 NOTE — H&P PST ADULT - MUSCULOSKELETAL COMMENTS
Extremity: residual tenderness dorsal aspect first MTP joint R forefoot, stiffness first MTP joint R forefoot (DF 10 degrees) Extremity: tenderness dorsal aspect first MTP joint R forefoot, stiffness first MTP joint R forefoot (DF 10 degrees)
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
## 2024-11-14 ENCOUNTER — NON-APPOINTMENT (OUTPATIENT)
Age: 54
End: 2024-11-14

## 2024-11-15 ENCOUNTER — TRANSCRIPTION ENCOUNTER (OUTPATIENT)
Age: 54
End: 2024-11-15

## 2024-11-15 ENCOUNTER — OUTPATIENT (OUTPATIENT)
Dept: OUTPATIENT SERVICES | Facility: HOSPITAL | Age: 54
LOS: 1 days | End: 2024-11-15
Payer: MEDICAID

## 2024-11-15 ENCOUNTER — APPOINTMENT (OUTPATIENT)
Dept: ORTHOPEDIC SURGERY | Facility: HOSPITAL | Age: 54
End: 2024-11-15

## 2024-11-15 VITALS
RESPIRATION RATE: 16 BRPM | TEMPERATURE: 97 F | HEART RATE: 67 BPM | HEIGHT: 71 IN | DIASTOLIC BLOOD PRESSURE: 82 MMHG | SYSTOLIC BLOOD PRESSURE: 133 MMHG | WEIGHT: 151.02 LBS | OXYGEN SATURATION: 99 %

## 2024-11-15 VITALS
SYSTOLIC BLOOD PRESSURE: 122 MMHG | RESPIRATION RATE: 14 BRPM | OXYGEN SATURATION: 97 % | HEART RATE: 70 BPM | DIASTOLIC BLOOD PRESSURE: 67 MMHG

## 2024-11-15 DIAGNOSIS — M20.21 HALLUX RIGIDUS, RIGHT FOOT: ICD-10-CM

## 2024-11-15 DIAGNOSIS — Z90.13 ACQUIRED ABSENCE OF BILATERAL BREASTS AND NIPPLES: Chronic | ICD-10-CM

## 2024-11-15 DIAGNOSIS — N88.8 OTHER SPECIFIED NONINFLAMMATORY DISORDERS OF CERVIX UTERI: Chronic | ICD-10-CM

## 2024-11-15 DIAGNOSIS — Z98.890 OTHER SPECIFIED POSTPROCEDURAL STATES: Chronic | ICD-10-CM

## 2024-11-15 DIAGNOSIS — Z98.82 BREAST IMPLANT STATUS: Chronic | ICD-10-CM

## 2024-11-15 PROCEDURE — 28289 CORRJ HALUX RIGDUS W/O IMPLT: CPT | Mod: RT

## 2024-11-15 PROCEDURE — 76000 FLUOROSCOPY <1 HR PHYS/QHP: CPT

## 2024-11-15 DEVICE — KWIRE 1.1MMX15.2CM: Type: IMPLANTABLE DEVICE | Site: RIGHT | Status: FUNCTIONAL

## 2024-11-15 DEVICE — KWIRE 1.6MM X 15.2CM: Type: IMPLANTABLE DEVICE | Site: RIGHT | Status: FUNCTIONAL

## 2024-11-15 RX ORDER — CHLORHEXIDINE GLUCONATE 40 MG/ML
1 SOLUTION TOPICAL ONCE
Refills: 0 | Status: COMPLETED | OUTPATIENT
Start: 2024-11-15 | End: 2024-11-15

## 2024-11-15 RX ORDER — SODIUM CHLORIDE 9 MG/ML
3 INJECTION, SOLUTION INTRAMUSCULAR; INTRAVENOUS; SUBCUTANEOUS EVERY 8 HOURS
Refills: 0 | Status: ACTIVE | OUTPATIENT
Start: 2024-11-15 | End: 2025-10-14

## 2024-11-15 RX ORDER — CEFAZOLIN SODIUM 1 G
2000 VIAL (EA) INJECTION ONCE
Refills: 0 | Status: COMPLETED | OUTPATIENT
Start: 2024-11-15 | End: 2024-11-15

## 2024-11-15 RX ORDER — OXYCODONE HYDROCHLORIDE 30 MG/1
5 TABLET ORAL ONCE
Refills: 0 | Status: DISCONTINUED | OUTPATIENT
Start: 2024-11-15 | End: 2024-11-15

## 2024-11-15 RX ORDER — FENTANYL CITRAT/DEXTROSE 5%/PF 1250MCG/50
25 PATIENT CONTROLLED ANALGESIA SYRINGE INTRAVENOUS
Refills: 0 | Status: DISCONTINUED | OUTPATIENT
Start: 2024-11-15 | End: 2024-11-15

## 2024-11-15 RX ORDER — LIDOCAINE HCL 60 MG/3 ML
0.2 SYRINGE (ML) INJECTION ONCE
Refills: 0 | Status: COMPLETED | OUTPATIENT
Start: 2024-11-15 | End: 2024-11-15

## 2024-11-15 RX ORDER — ONDANSETRON HYDROCHLORIDE 2 MG/ML
4 INJECTION, SOLUTION INTRAMUSCULAR; INTRAVENOUS ONCE
Refills: 0 | Status: ACTIVE | OUTPATIENT
Start: 2024-11-15 | End: 2025-10-14

## 2024-11-15 RX ADMIN — Medication 100 MILLILITER(S): at 07:33

## 2024-11-15 RX ADMIN — CHLORHEXIDINE GLUCONATE 1 APPLICATION(S): 40 SOLUTION TOPICAL at 07:34

## 2024-11-15 NOTE — ASU DISCHARGE PLAN (ADULT/PEDIATRIC) - NURSING INSTRUCTIONS
Your first dose of Tylenol was given at _______ 08:55 AM____.  Next dose of Tylenol will be on or after _______2:55 PM____ ,today/tonight and every 6 hours afterwards as needed for pain management, do not take any Tylenol containing products until this time. Do not exceed more than 4000mg of Tylenol in one 24 hour setting. If no contraindications, you may alternate with Ibuprofen  3 hours after dose of Tylenol. Ibuprofen can be taken every 6 hours. Your first dose of Tylenol was given at _______ 08:55 AM____.  Next dose of Tylenol will be on or after _______2:55 PM____ ,today/tonight and every 6 hours afterwards as needed for pain management, do not take any Tylenol containing products until this time. Do not exceed more than 4000mg of Tylenol in one 24 hour setting.

## 2024-11-15 NOTE — BRIEF OPERATIVE NOTE - CONDITION POST OP
PRIOR AUTHORIZATION DENIED    Medication: TRULICITY 0.75 MG/0.5ML pen     Denial Date: 11/9/2021    Denial Rationale: Tier exception denied as there are no lower tier alternatives. Medication is covered on third tier. (Med has been approved for step therapy starting 1/1/22 until 1/1/23).    Appeal Information: This submission was considered an appeal, unsure as to who submitted initial request.  Second level appeals will be managed by the clinic staff and provider.                    Stable

## 2024-11-15 NOTE — ASU DISCHARGE PLAN (ADULT/PEDIATRIC) - NS MD DC FALL RISK RISK
For information on Fall & Injury Prevention, visit: [email or web address] OR  [email or web address]://[email or web address] OR  https://www.cdc.gov/steadi/patient.html

## 2024-11-15 NOTE — ASU PATIENT PROFILE, ADULT - FALL HARM RISK - HARM RISK INTERVENTIONS

## 2024-11-15 NOTE — ASU PATIENT PROFILE, ADULT - NSICDXPASTSURGICALHX_GEN_ALL_CORE_FT
PAST SURGICAL HISTORY:  Cervical mass removal    H/O bilateral mastectomy     H/O breast implant bilat    H/O breast reconstruction     S/P lymph node biopsy right

## 2024-11-15 NOTE — ASU DISCHARGE PLAN (ADULT/PEDIATRIC) - FINANCIAL ASSISTANCE
[facility] provides services at a reduced cost to those who are determined to be eligible through [facility]’s financial assistance program. Information regarding [facility]’s financial assistance program can be found by going to [email or web address] or by calling [phone].

## 2024-11-15 NOTE — ASU DISCHARGE PLAN (ADULT/PEDIATRIC) - ASU DC SPECIAL INSTRUCTIONSFT
FOLLOW UP: Please follow up with your surgeon in 2 weeks, call to make an appt.  DIET: You may resume your regular diet.  BATHING: Keep your surgical area clean and dry, use sponge bath to bathe.  Do not submerge wound underwater.   ACTIVITY: No heavy lifting or straining for 2 weeks. Otherwise, you may return to your usual level of physical activity. If you are taking narcotic pain medication (such as vicodin, oxycodone, or Percocet) DO NOT drive a car, operate machinery or make important decisions.  NOTIFY YOUR SURGEON IF: You have any bleeding that does not stop, any pus draining from your wound(s), any fever (over 100.4 F) or chills, persistent nausea/vomiting, persistent diarrhea, or if your pain is not controlled on your discharge pain medications.  WOUND CARE:  Please keep incisions clean and dry. Please do not scrub or rub incisions. Do not use lotion or powder on incisions.

## 2024-11-15 NOTE — PRE-ANESTHESIA EVALUATION ADULT - NSANTHBMIRD_ENT_A_CORE
Patient here for Covid Testing. Exposure, chills, fever, cough and headache.    Gissell Mon MA on 1/28/2022 at 1:24 PM     No

## 2024-11-15 NOTE — ASU DISCHARGE PLAN (ADULT/PEDIATRIC) - CARE PROVIDER_API CALL
Jason Mejia  Orthopaedic Surgery  [address]  [place] [address]  Phone: [phone]  Fax: [phone]  Follow Up Time: 2 weeks

## 2024-11-21 ENCOUNTER — NON-APPOINTMENT (OUTPATIENT)
Age: 54
End: 2024-11-21

## 2024-11-21 PROBLEM — M20.21 HALLUX RIGIDUS, RIGHT FOOT: Chronic | Status: ACTIVE | Noted: 2024-11-01

## 2024-11-21 RX ORDER — OXYCODONE 5 MG/1
5 TABLET ORAL
Qty: 30 | Refills: 0 | Status: ACTIVE | COMMUNITY
Start: 2024-11-14 | End: 1900-01-01

## 2024-11-22 ENCOUNTER — NON-APPOINTMENT (OUTPATIENT)
Age: 54
End: 2024-11-22

## 2024-11-25 ENCOUNTER — APPOINTMENT (OUTPATIENT)
Dept: PLASTIC SURGERY | Facility: CLINIC | Age: 54
End: 2024-11-25

## 2024-11-25 ENCOUNTER — NON-APPOINTMENT (OUTPATIENT)
Age: 54
End: 2024-11-25

## 2024-12-02 ENCOUNTER — NON-APPOINTMENT (OUTPATIENT)
Age: 54
End: 2024-12-02

## 2024-12-02 ENCOUNTER — APPOINTMENT (OUTPATIENT)
Dept: ORTHOPEDIC SURGERY | Facility: CLINIC | Age: 54
End: 2024-12-02
Payer: MEDICAID

## 2024-12-02 DIAGNOSIS — Z98.890 OTHER SPECIFIED POSTPROCEDURAL STATES: ICD-10-CM

## 2024-12-02 DIAGNOSIS — M20.21 HALLUX RIGIDUS, RIGHT FOOT: ICD-10-CM

## 2024-12-02 PROCEDURE — 99024 POSTOP FOLLOW-UP VISIT: CPT

## 2024-12-06 ENCOUNTER — NON-APPOINTMENT (OUTPATIENT)
Age: 54
End: 2024-12-06

## 2024-12-10 ENCOUNTER — NON-APPOINTMENT (OUTPATIENT)
Age: 54
End: 2024-12-10

## 2024-12-20 ENCOUNTER — NON-APPOINTMENT (OUTPATIENT)
Age: 54
End: 2024-12-20

## 2025-01-07 ENCOUNTER — NON-APPOINTMENT (OUTPATIENT)
Age: 55
End: 2025-01-07

## 2025-01-07 ENCOUNTER — APPOINTMENT (OUTPATIENT)
Dept: HEMATOLOGY ONCOLOGY | Facility: CLINIC | Age: 55
End: 2025-01-07

## 2025-01-09 ENCOUNTER — RESULT REVIEW (OUTPATIENT)
Age: 55
End: 2025-01-09

## 2025-01-09 ENCOUNTER — APPOINTMENT (OUTPATIENT)
Dept: HEMATOLOGY ONCOLOGY | Facility: CLINIC | Age: 55
End: 2025-01-09

## 2025-01-09 VITALS
RESPIRATION RATE: 16 BRPM | TEMPERATURE: 98.7 F | DIASTOLIC BLOOD PRESSURE: 83 MMHG | BODY MASS INDEX: 25.56 KG/M2 | HEART RATE: 66 BPM | OXYGEN SATURATION: 98 % | HEIGHT: 65 IN | SYSTOLIC BLOOD PRESSURE: 136 MMHG | WEIGHT: 153.44 LBS

## 2025-01-09 PROCEDURE — 99214 OFFICE O/P EST MOD 30 MIN: CPT

## 2025-01-10 ENCOUNTER — NON-APPOINTMENT (OUTPATIENT)
Age: 55
End: 2025-01-10

## 2025-01-16 NOTE — PRE-OP CHECKLIST - TAMPON REMOVED
Patient canceled [facility] Med Check for 1/13/2025. Writer GIANFRANCO to call [place] for scheduling assistance.  
n/a

## 2025-01-17 DIAGNOSIS — Z98.890 OTHER SPECIFIED POSTPROCEDURAL STATES: ICD-10-CM

## 2025-01-17 RX ORDER — OXYCODONE AND ACETAMINOPHEN 5; 325 MG/1; MG/1
5-325 TABLET ORAL EVERY 6 HOURS
Qty: 20 | Refills: 0 | Status: ACTIVE | COMMUNITY
Start: 2025-01-17 | End: 1900-01-01

## 2025-01-23 NOTE — ASU PATIENT PROFILE, ADULT - REASON FOR ADMISSION, PROFILE
BILATERAL BREAST RECONSTRUCTION REVISION, BILATERAL BREAST FAT GRAFTING, ABDOMINAL DONOR SITE REVISION, MEDIPORT REMOVAL,
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
## 2025-01-23 NOTE — ASU PATIENT PROFILE, ADULT - FALL HARM RISK - RISK INTERVENTIONS

## 2025-01-23 NOTE — ASU PATIENT PROFILE, ADULT - FALL HARM RISK - HARM RISK INTERVENTIONS
Communicate Risk of Fall with Harm to all staff/Reinforce activity limits and safety measures with patient and family/Tailored Fall Risk Interventions/Visual Cue: Yellow wristband and red socks/Bed in lowest position, wheels locked, appropriate side rails in place/Call bell, personal items and telephone in reach/Instruct patient to call for assistance before getting out of bed or chair/Non-slip footwear when patient is out of bed/[place] to call system/Physically safe environment - no spills, clutter or unnecessary equipment/Purposeful Proactive Rounding/Room/bathroom lighting operational, light cord in reach Communicate Risk of Fall with Harm to all staff/Reinforce activity limits and safety measures with patient and family/Tailored Fall Risk Interventions/Visual Cue: Yellow wristband and red socks/Bed in lowest position, wheels locked, appropriate side rails in place/Call bell, personal items and telephone in reach/Instruct patient to call for assistance before getting out of bed or chair/Non-slip footwear when patient is out of bed/[place] to call system/Physically safe environment - no spills, clutter or unnecessary equipment/Purposeful Proactive Rounding/Room/bathroom lighting operational, light cord in reach

## 2025-01-23 NOTE — ASU PATIENT PROFILE, ADULT - NSALCOHOLAMT_GEN_A_CORE_SD
1-2 drinks Judson Mckeon)  EndocrinologyMetabDiabetes  [address], [address]  [place] [id number]  Phone: [phone]  Fax: [phone]  Follow Up Time:

## 2025-01-24 ENCOUNTER — OUTPATIENT (OUTPATIENT)
Dept: OUTPATIENT SERVICES | Facility: HOSPITAL | Age: 55
LOS: 1 days | Discharge: HOME | End: 2025-01-24
Payer: MEDICAID

## 2025-01-24 ENCOUNTER — RESULT REVIEW (OUTPATIENT)
Age: 55
End: 2025-01-24

## 2025-01-24 ENCOUNTER — TRANSCRIPTION ENCOUNTER (OUTPATIENT)
Age: 55
End: 2025-01-24

## 2025-01-24 VITALS
OXYGEN SATURATION: 99 % | RESPIRATION RATE: 14 BRPM | DIASTOLIC BLOOD PRESSURE: 63 MMHG | HEART RATE: 65 BPM | SYSTOLIC BLOOD PRESSURE: 120 MMHG

## 2025-01-24 VITALS
WEIGHT: 154.1 LBS | TEMPERATURE: 97 F | HEIGHT: 71 IN | HEART RATE: 68 BPM | SYSTOLIC BLOOD PRESSURE: 121 MMHG | OXYGEN SATURATION: 98 % | DIASTOLIC BLOOD PRESSURE: 82 MMHG | RESPIRATION RATE: 16 BRPM

## 2025-01-24 DIAGNOSIS — N88.8 OTHER SPECIFIED NONINFLAMMATORY DISORDERS OF CERVIX UTERI: Chronic | ICD-10-CM

## 2025-01-24 DIAGNOSIS — Z98.890 OTHER SPECIFIED POSTPROCEDURAL STATES: Chronic | ICD-10-CM

## 2025-01-24 DIAGNOSIS — Z98.82 BREAST IMPLANT STATUS: Chronic | ICD-10-CM

## 2025-01-24 DIAGNOSIS — Z90.13 ACQUIRED ABSENCE OF BILATERAL BREASTS AND NIPPLES: Chronic | ICD-10-CM

## 2025-01-24 PROCEDURE — 15772 GRFG AUTOL FAT LIPO EA ADDL: CPT

## 2025-01-24 PROCEDURE — 14301 TIS TRNFR ANY 30.1-60 SQ CM: CPT

## 2025-01-24 PROCEDURE — 19380 REVJ RECONSTRUCTED BREAST: CPT | Mod: 50

## 2025-01-24 PROCEDURE — 36590 REMOVAL TUNNELED CV CATH: CPT

## 2025-01-24 PROCEDURE — 15771 GRFG AUTOL FAT LIPO 50 CC/<: CPT

## 2025-01-24 PROCEDURE — 88300 SURGICAL PATH GROSS: CPT | Mod: 26

## 2025-01-24 RX ORDER — ONDANSETRON 4 MG/1
4 TABLET ORAL ONCE
Refills: 0 | Status: DISCONTINUED | OUTPATIENT
Start: 2025-01-24 | End: 2025-01-24

## 2025-01-24 RX ORDER — ACETAMINOPHEN 80 MG/.8ML
1000 SOLUTION/ DROPS ORAL ONCE
Refills: 0 | Status: COMPLETED | OUTPATIENT
Start: 2025-01-24 | End: 2025-01-24

## 2025-01-24 RX ORDER — SODIUM CHLORIDE 9 MG/ML
1000 INJECTION, SOLUTION INTRAVENOUS
Refills: 0 | Status: DISCONTINUED | OUTPATIENT
Start: 2025-01-24 | End: 2025-01-24

## 2025-01-24 RX ORDER — APREPITANT 40 MG/1
40 CAPSULE ORAL ONCE
Refills: 0 | Status: COMPLETED | OUTPATIENT
Start: 2025-01-24 | End: 2025-01-24

## 2025-01-24 RX ORDER — FENTANYL 75 UG/H
25 PATCH, EXTENDED RELEASE TRANSDERMAL
Refills: 0 | Status: DISCONTINUED | OUTPATIENT
Start: 2025-01-24 | End: 2025-01-24

## 2025-01-24 RX ORDER — GABAPENTIN 300 MG/1
1 CAPSULE ORAL
Refills: 0 | DISCHARGE

## 2025-01-24 RX ORDER — HYDROMORPHONE HCL 4 MG
0.5 TABLET ORAL
Refills: 0 | Status: DISCONTINUED | OUTPATIENT
Start: 2025-01-24 | End: 2025-01-24

## 2025-01-24 RX ADMIN — FENTANYL 25 MICROGRAM(S): 75 PATCH, EXTENDED RELEASE TRANSDERMAL at 11:11

## 2025-01-24 RX ADMIN — ACETAMINOPHEN 1000 MILLIGRAM(S): 80 SOLUTION/ DROPS ORAL at 06:30

## 2025-01-24 RX ADMIN — FENTANYL 25 MICROGRAM(S): 75 PATCH, EXTENDED RELEASE TRANSDERMAL at 11:00

## 2025-01-24 RX ADMIN — FENTANYL 25 MICROGRAM(S): 75 PATCH, EXTENDED RELEASE TRANSDERMAL at 10:58

## 2025-01-24 RX ADMIN — APREPITANT 40 MILLIGRAM(S): 40 CAPSULE ORAL at 06:30

## 2025-01-24 RX ADMIN — FENTANYL 25 MICROGRAM(S): 75 PATCH, EXTENDED RELEASE TRANSDERMAL at 11:25

## 2025-01-24 NOTE — PRE-ANESTHESIA EVALUATION ADULT - NSATTENDATTESTRD_GEN_ALL_CORE
-- DO NOT REPLY / DO NOT REPLY ALL --  -- Message is from Engagement Center Operations (ECO) --    Offered Waitlist if Available for the Visit Type? No    Pediatric Specialty Appointment Request    Name: Tye Diego  : 2018  MRN: [id number]    Caller Information       Type Contact Phone/Fax    11/10/2022 02:56 PM CST Phone (Incoming) ELISSA TREVINO (Mother) [phone] (M)          Appointment requested with: Rosetta RIZVI, Ruy or partner she is ok with that.       Specialty:  Peds Developmental and Behavioral Health     If patient has thoughts to harm self or others/suicidal thoughts - Has the issue/symptom gotten worse since they were seen by PCP?    Not applicable - Not about suicide/harm to self or others    Reason for appointment: Autism Spectrum Disorders (ASD) evaluation , banging his head when he is frustration     Referred by: Altagracia Chase MD    The patient has been on the waiting list for about 6-8 months.     Insurance verified:  Yes    Patient scheduling preference:  ASAP    Patient requests callback: Yes   Callback phone number: [phone]        Can a detailed message be left? Yes    Message Turnaround:     [place]:    Please give this turnaround time to the caller:   \"This message will be sent to [state Provider's name]. The clinical team will fulfill your request as soon as they review your message.\"   The patient has been re-examined and I agree with the above assessment or I updated with my findings.

## 2025-01-24 NOTE — ASU DISCHARGE PLAN (ADULT/PEDIATRIC) - ASU DC SPECIAL INSTRUCTIONSFT
Please follow instructions provided by Dr. Barry and take all prescribed medications as directed.    Please follow up with Dr. Barry next Monday (2/3).

## 2025-01-24 NOTE — ASU DISCHARGE PLAN (ADULT/PEDIATRIC) - NS MD DC FALL RISK RISK
For information on Fall & Injury Prevention, visit: [email or web address].[email or web address]/[email or web address]-[email or web address]-mobility OR  https://www.[facility]/[email or web address]-tips-to-avoid-injury OR  https://www.cdc.gov/steadi/patient.html

## 2025-01-24 NOTE — PRE-ANESTHESIA EVALUATION ADULT - NSANTHAPLANRD_GEN_ALL_CORE
Caller: SYED HAYS    Relationship to patient: Emergency Contact    Best call back number: [phone]    Patient is needing: PATIENT'S FRIEND CALLED IN AND SAID PATIENT WOULD LIKE TO HAVE HER LAB ORDERS SENT TO [facility] PHONE NUMBER [phone] AND FAX NUMBER [phone]. HE STATED THAT SHE IS SUPPOSED TO HAVE AN ORDER FOR CMP, LIPID PANEL, HGA1C, CBC, VITAMIN D 25 HYDROXY, HEP C ANTIBODY AND URINE MICROALBUMEN . PLEASE CALL AND ADVISE. PATIENT'S FRIEND SAID CLINIC IS NEAR TO HER HOME.          
Juan Manuel is aware. This has been faxed via right fax.  
general

## 2025-01-24 NOTE — ASU DISCHARGE PLAN (ADULT/PEDIATRIC) - FINANCIAL ASSISTANCE
[facility] provides services at a reduced cost to those who are determined to be eligible through [facility]’s financial assistance program. Information regarding [facility]’s financial assistance program can be found by going to [email or web address]://[email or web address].[facility]/assistance or by calling [phone].

## 2025-01-24 NOTE — ASU DISCHARGE PLAN (ADULT/PEDIATRIC) - CARE PROVIDER_API CALL
Jose R Barry  Plastic Surgery  [address], [address]  [place] [address]  Phone: [phone]  Fax: [phone]  Follow Up Time: 1 week

## 2025-01-24 NOTE — BRIEF OPERATIVE NOTE - OPERATION/FINDINGS
Bilateral breast reconstruction revision, liposuction from back/flanks, fat grafting to bilateral breasts, mediport removal, breast and abdominal scar revisions

## 2025-01-29 RX ORDER — IBUPROFEN 600 MG/1
600 TABLET, FILM COATED ORAL EVERY 6 HOURS
Qty: 24 | Refills: 0 | Status: ACTIVE | COMMUNITY
Start: 2025-01-29 | End: 1900-01-01

## 2025-02-03 ENCOUNTER — APPOINTMENT (OUTPATIENT)
Dept: PLASTIC SURGERY | Facility: CLINIC | Age: 55
End: 2025-02-03
Payer: MEDICAID

## 2025-02-03 DIAGNOSIS — N65.0 DEFORMITY OF RECONSTRUCTED BREAST: ICD-10-CM

## 2025-02-03 PROBLEM — Z87.2 PERSONAL HISTORY OF DISEASES OF THE SKIN AND SUBCUTANEOUS TISSUE: Chronic | Status: ACTIVE | Noted: 2025-01-23

## 2025-02-03 PROBLEM — F32.9 MAJOR DEPRESSIVE DISORDER, SINGLE EPISODE, UNSPECIFIED: Chronic | Status: ACTIVE | Noted: 2025-01-23

## 2025-02-03 PROCEDURE — 99024 POSTOP FOLLOW-UP VISIT: CPT

## 2025-02-06 ENCOUNTER — NON-APPOINTMENT (OUTPATIENT)
Age: 55
End: 2025-02-06

## 2025-02-07 ENCOUNTER — NON-APPOINTMENT (OUTPATIENT)
Age: 55
End: 2025-02-07

## 2025-02-10 ENCOUNTER — NON-APPOINTMENT (OUTPATIENT)
Age: 55
End: 2025-02-10

## 2025-02-11 ENCOUNTER — NON-APPOINTMENT (OUTPATIENT)
Age: 55
End: 2025-02-11

## 2025-02-12 ENCOUNTER — NON-APPOINTMENT (OUTPATIENT)
Age: 55
End: 2025-02-12

## 2025-02-21 ENCOUNTER — NON-APPOINTMENT (OUTPATIENT)
Age: 55
End: 2025-02-21

## 2025-02-26 ENCOUNTER — NON-APPOINTMENT (OUTPATIENT)
Age: 55
End: 2025-02-26

## 2025-02-27 NOTE — ASU PATIENT PROFILE, ADULT - FALL HARM RISK - TYPE OF ASSESSMENT
Admission Patient requests all Lab, Cardiology, and Radiology Results on their Discharge Instructions

## 2025-02-28 ENCOUNTER — NON-APPOINTMENT (OUTPATIENT)
Age: 55
End: 2025-02-28

## 2025-03-07 ENCOUNTER — NON-APPOINTMENT (OUTPATIENT)
Age: 55
End: 2025-03-07

## 2025-03-14 ENCOUNTER — NON-APPOINTMENT (OUTPATIENT)
Age: 55
End: 2025-03-14

## 2025-03-24 ENCOUNTER — NON-APPOINTMENT (OUTPATIENT)
Age: 55
End: 2025-03-24

## 2025-03-24 ENCOUNTER — APPOINTMENT (OUTPATIENT)
Dept: ORTHOPEDIC SURGERY | Facility: CLINIC | Age: 55
End: 2025-03-24
Payer: MEDICAID

## 2025-03-24 DIAGNOSIS — M20.21 HALLUX RIGIDUS, RIGHT FOOT: ICD-10-CM

## 2025-03-24 DIAGNOSIS — Z98.890 OTHER SPECIFIED POSTPROCEDURAL STATES: ICD-10-CM

## 2025-03-24 PROCEDURE — 99213 OFFICE O/P EST LOW 20 MIN: CPT

## 2025-03-25 ENCOUNTER — NON-APPOINTMENT (OUTPATIENT)
Age: 55
End: 2025-03-25

## 2025-03-25 ENCOUNTER — APPOINTMENT (OUTPATIENT)
Dept: BREAST CENTER | Facility: CLINIC | Age: 55
End: 2025-03-25
Payer: MEDICAID

## 2025-03-25 VITALS — WEIGHT: 152 LBS | BODY MASS INDEX: 25.33 KG/M2 | HEIGHT: 65 IN

## 2025-03-25 DIAGNOSIS — Z90.13 ACQUIRED ABSENCE OF BILATERAL BREASTS AND NIPPLES: ICD-10-CM

## 2025-03-25 DIAGNOSIS — Z85.3 PERSONAL HISTORY OF MALIGNANT NEOPLASM OF BREAST: ICD-10-CM

## 2025-03-25 DIAGNOSIS — Z92.3 PERSONAL HISTORY OF IRRADIATION: ICD-10-CM

## 2025-03-25 DIAGNOSIS — R92.8 OTHER ABNORMAL AND INCONCLUSIVE FINDINGS ON DIAGNOSTIC IMAGING OF BREAST: ICD-10-CM

## 2025-03-25 PROCEDURE — 99214 OFFICE O/P EST MOD 30 MIN: CPT

## 2025-03-26 ENCOUNTER — APPOINTMENT (OUTPATIENT)
Dept: PHYSICAL MEDICINE AND REHAB | Facility: CLINIC | Age: 55
End: 2025-03-26
Payer: MEDICAID

## 2025-03-26 VITALS — SYSTOLIC BLOOD PRESSURE: 129 MMHG | HEART RATE: 68 BPM | DIASTOLIC BLOOD PRESSURE: 76 MMHG

## 2025-03-26 DIAGNOSIS — G89.28 OTHER CHRONIC POSTPROCEDURAL PAIN: ICD-10-CM

## 2025-03-26 PROCEDURE — 99205 OFFICE O/P NEW HI 60 MIN: CPT

## 2025-03-26 RX ORDER — GABAPENTIN 300 MG/1
300 CAPSULE ORAL
Qty: 30 | Refills: 2 | Status: ACTIVE | COMMUNITY
Start: 2025-03-26 | End: 1900-01-01

## 2025-03-26 RX ORDER — LIDOCAINE 40 MG/G
4 PATCH TOPICAL
Qty: 1 | Refills: 0 | Status: ACTIVE | COMMUNITY
Start: 2025-03-26 | End: 1900-01-01

## 2025-03-27 ENCOUNTER — NON-APPOINTMENT (OUTPATIENT)
Age: 55
End: 2025-03-27

## 2025-04-03 ENCOUNTER — APPOINTMENT (OUTPATIENT)
Dept: PSYCHIATRY | Facility: CLINIC | Age: 55
End: 2025-04-03

## 2025-04-04 ENCOUNTER — APPOINTMENT (OUTPATIENT)
Dept: HEMATOLOGY ONCOLOGY | Facility: CLINIC | Age: 55
End: 2025-04-04

## 2025-04-07 ENCOUNTER — APPOINTMENT (OUTPATIENT)
Dept: PLASTIC SURGERY | Facility: CLINIC | Age: 55
End: 2025-04-07
Payer: MEDICAID

## 2025-04-07 DIAGNOSIS — N65.0 DEFORMITY OF RECONSTRUCTED BREAST: ICD-10-CM

## 2025-04-07 PROCEDURE — 99024 POSTOP FOLLOW-UP VISIT: CPT

## 2025-04-10 ENCOUNTER — APPOINTMENT (OUTPATIENT)
Dept: PSYCHIATRY | Facility: CLINIC | Age: 55
End: 2025-04-10
Payer: MEDICAID

## 2025-04-10 PROBLEM — F43.22 ADJUSTMENT DISORDER WITH ANXIOUS MOOD: Status: ACTIVE | Noted: 2025-04-10

## 2025-04-10 PROCEDURE — 90791 PSYCH DIAGNOSTIC EVALUATION: CPT | Mod: 95

## 2025-04-11 ENCOUNTER — NON-APPOINTMENT (OUTPATIENT)
Age: 55
End: 2025-04-11

## 2025-04-14 ENCOUNTER — RESULT REVIEW (OUTPATIENT)
Age: 55
End: 2025-04-14

## 2025-04-14 ENCOUNTER — APPOINTMENT (OUTPATIENT)
Dept: HEMATOLOGY ONCOLOGY | Facility: CLINIC | Age: 55
End: 2025-04-14

## 2025-04-14 VITALS
OXYGEN SATURATION: 98 % | HEIGHT: 65 IN | RESPIRATION RATE: 16 BRPM | DIASTOLIC BLOOD PRESSURE: 88 MMHG | WEIGHT: 153 LBS | TEMPERATURE: 97.6 F | BODY MASS INDEX: 25.49 KG/M2 | SYSTOLIC BLOOD PRESSURE: 136 MMHG | HEART RATE: 62 BPM

## 2025-04-17 ENCOUNTER — APPOINTMENT (OUTPATIENT)
Dept: PSYCHIATRY | Facility: CLINIC | Age: 55
End: 2025-04-17
Payer: MEDICAID

## 2025-04-17 ENCOUNTER — NON-APPOINTMENT (OUTPATIENT)
Age: 55
End: 2025-04-17

## 2025-04-17 PROCEDURE — 90837 PSYTX W PT 60 MINUTES: CPT | Mod: 95

## 2025-04-18 ENCOUNTER — NON-APPOINTMENT (OUTPATIENT)
Age: 55
End: 2025-04-18

## 2025-04-24 ENCOUNTER — APPOINTMENT (OUTPATIENT)
Dept: PSYCHIATRY | Facility: CLINIC | Age: 55
End: 2025-04-24

## 2025-04-24 PROCEDURE — 90834 PSYTX W PT 45 MINUTES: CPT | Mod: 95

## 2025-05-01 ENCOUNTER — APPOINTMENT (OUTPATIENT)
Dept: PSYCHIATRY | Facility: CLINIC | Age: 55
End: 2025-05-01
Payer: MEDICAID

## 2025-05-01 DIAGNOSIS — F43.22 ADJUSTMENT DISORDER WITH ANXIETY: ICD-10-CM

## 2025-05-01 PROCEDURE — 90832 PSYTX W PT 30 MINUTES: CPT | Mod: 95

## 2025-05-07 ENCOUNTER — NON-APPOINTMENT (OUTPATIENT)
Age: 55
End: 2025-05-07

## 2025-05-08 ENCOUNTER — APPOINTMENT (OUTPATIENT)
Dept: PSYCHIATRY | Facility: CLINIC | Age: 55
End: 2025-05-08

## 2025-05-09 ENCOUNTER — NON-APPOINTMENT (OUTPATIENT)
Age: 55
End: 2025-05-09

## 2025-05-15 ENCOUNTER — APPOINTMENT (OUTPATIENT)
Dept: PSYCHIATRY | Facility: CLINIC | Age: 55
End: 2025-05-15
Payer: MEDICAID

## 2025-05-15 PROCEDURE — 90837 PSYTX W PT 60 MINUTES: CPT | Mod: 95

## 2025-05-16 ENCOUNTER — NON-APPOINTMENT (OUTPATIENT)
Age: 55
End: 2025-05-16

## 2025-05-19 ENCOUNTER — NON-APPOINTMENT (OUTPATIENT)
Age: 55
End: 2025-05-19

## 2025-05-20 ENCOUNTER — APPOINTMENT (OUTPATIENT)
Dept: ORTHOPEDIC SURGERY | Facility: CLINIC | Age: 55
End: 2025-05-20
Payer: MEDICAID

## 2025-05-20 DIAGNOSIS — Z98.890 OTHER SPECIFIED POSTPROCEDURAL STATES: ICD-10-CM

## 2025-05-20 DIAGNOSIS — M79.671 PAIN IN RIGHT FOOT: ICD-10-CM

## 2025-05-20 DIAGNOSIS — S92.511A DISPLACED FRACTURE OF PROXIMAL PHALANX OF RIGHT LESSER TOE(S), INITIAL ENCOUNTER FOR CLOSED FRACTURE: ICD-10-CM

## 2025-05-20 PROCEDURE — 99213 OFFICE O/P EST LOW 20 MIN: CPT

## 2025-05-20 PROCEDURE — 73630 X-RAY EXAM OF FOOT: CPT | Mod: RT

## 2025-05-22 ENCOUNTER — APPOINTMENT (OUTPATIENT)
Dept: PSYCHIATRY | Facility: CLINIC | Age: 55
End: 2025-05-22

## 2025-05-22 DIAGNOSIS — F43.22 ADJUSTMENT DISORDER WITH ANXIETY: ICD-10-CM

## 2025-05-22 PROCEDURE — 90834 PSYTX W PT 45 MINUTES: CPT | Mod: 95

## 2025-05-26 PROBLEM — S92.511A CLOSED DISPLACED FRACTURE OF PROXIMAL PHALANX OF LESSER TOE OF RIGHT FOOT, INITIAL ENCOUNTER: Status: ACTIVE | Noted: 2025-05-26

## 2025-05-29 ENCOUNTER — APPOINTMENT (OUTPATIENT)
Dept: PSYCHIATRY | Facility: CLINIC | Age: 55
End: 2025-05-29

## 2025-05-30 ENCOUNTER — NON-APPOINTMENT (OUTPATIENT)
Age: 55
End: 2025-05-30

## 2025-06-05 ENCOUNTER — APPOINTMENT (OUTPATIENT)
Dept: PSYCHIATRY | Facility: CLINIC | Age: 55
End: 2025-06-05

## 2025-06-05 PROCEDURE — 90834 PSYTX W PT 45 MINUTES: CPT | Mod: 95

## 2025-06-09 NOTE — DISCHARGE INSTRUCTIONS: GENERAL THERAPY - NSFACILITYCONTAFTRHOUR_HEME_A_AMB
Informed consent was obtained and documented in this chart by MD. I am currently awaiting blood to be prepared by blood bank.    [facility] ([phone])

## 2025-06-12 ENCOUNTER — APPOINTMENT (OUTPATIENT)
Dept: PSYCHIATRY | Facility: CLINIC | Age: 55
End: 2025-06-12
Payer: MEDICAID

## 2025-06-12 PROCEDURE — 90837 PSYTX W PT 60 MINUTES: CPT | Mod: 95

## 2025-06-25 ENCOUNTER — NON-APPOINTMENT (OUTPATIENT)
Age: 55
End: 2025-06-25

## 2025-06-25 ENCOUNTER — APPOINTMENT (OUTPATIENT)
Dept: PHYSICAL MEDICINE AND REHAB | Facility: CLINIC | Age: 55
End: 2025-06-25
Payer: MEDICAID

## 2025-06-25 VITALS — HEART RATE: 77 BPM | SYSTOLIC BLOOD PRESSURE: 121 MMHG | DIASTOLIC BLOOD PRESSURE: 83 MMHG

## 2025-06-25 PROCEDURE — 99215 OFFICE O/P EST HI 40 MIN: CPT

## 2025-06-27 ENCOUNTER — NON-APPOINTMENT (OUTPATIENT)
Age: 55
End: 2025-06-27

## 2025-06-30 ENCOUNTER — RX RENEWAL (OUTPATIENT)
Age: 55
End: 2025-06-30

## 2025-07-01 ENCOUNTER — APPOINTMENT (OUTPATIENT)
Dept: ORTHOPEDIC SURGERY | Facility: CLINIC | Age: 55
End: 2025-07-01
Payer: MEDICAID

## 2025-07-01 PROBLEM — S92.511D CLOSED DISPLACED FRACTURE OF PROXIMAL PHALANX OF LESSER TOE OF RIGHT FOOT WITH ROUTINE HEALING, SUBSEQUENT ENCOUNTER: Status: ACTIVE | Noted: 2025-07-01

## 2025-07-01 PROCEDURE — 73630 X-RAY EXAM OF FOOT: CPT | Mod: RT

## 2025-07-01 PROCEDURE — 99213 OFFICE O/P EST LOW 20 MIN: CPT

## 2025-07-03 ENCOUNTER — APPOINTMENT (OUTPATIENT)
Dept: PSYCHIATRY | Facility: CLINIC | Age: 55
End: 2025-07-03

## 2025-07-10 ENCOUNTER — APPOINTMENT (OUTPATIENT)
Dept: PSYCHIATRY | Facility: CLINIC | Age: 55
End: 2025-07-10
Payer: MEDICAID

## 2025-07-10 PROCEDURE — 90834 PSYTX W PT 45 MINUTES: CPT | Mod: 95

## 2025-07-17 ENCOUNTER — APPOINTMENT (OUTPATIENT)
Dept: PSYCHIATRY | Facility: CLINIC | Age: 55
End: 2025-07-17

## 2025-07-17 PROCEDURE — 90834 PSYTX W PT 45 MINUTES: CPT | Mod: 95

## 2025-07-22 ENCOUNTER — NON-APPOINTMENT (OUTPATIENT)
Age: 55
End: 2025-07-22

## 2025-07-24 ENCOUNTER — APPOINTMENT (OUTPATIENT)
Dept: PSYCHIATRY | Facility: CLINIC | Age: 55
End: 2025-07-24

## 2025-07-24 ENCOUNTER — RESULT REVIEW (OUTPATIENT)
Age: 55
End: 2025-07-24

## 2025-07-24 ENCOUNTER — APPOINTMENT (OUTPATIENT)
Dept: HEMATOLOGY ONCOLOGY | Facility: CLINIC | Age: 55
End: 2025-07-24
Payer: MEDICAID

## 2025-07-24 ENCOUNTER — NON-APPOINTMENT (OUTPATIENT)
Age: 55
End: 2025-07-24

## 2025-07-24 VITALS
HEART RATE: 55 BPM | HEIGHT: 65 IN | WEIGHT: 152 LBS | OXYGEN SATURATION: 98 % | BODY MASS INDEX: 25.33 KG/M2 | SYSTOLIC BLOOD PRESSURE: 141 MMHG | RESPIRATION RATE: 16 BRPM | DIASTOLIC BLOOD PRESSURE: 55 MMHG | TEMPERATURE: 97.3 F

## 2025-07-24 DIAGNOSIS — R26.89 OTHER ABNORMALITIES OF GAIT AND MOBILITY: ICD-10-CM

## 2025-07-24 DIAGNOSIS — C50.912 MALIGNANT NEOPLASM OF UNSPECIFIED SITE OF LEFT FEMALE BREAST: ICD-10-CM

## 2025-07-24 DIAGNOSIS — C77.9 SECONDARY AND UNSPECIFIED MALIGNANT NEOPLASM OF LYMPH NODE, UNSPECIFIED: ICD-10-CM

## 2025-07-24 DIAGNOSIS — Z17.32 MALIGNANT NEOPLASM OF UNSPECIFIED SITE OF LEFT FEMALE BREAST: ICD-10-CM

## 2025-07-24 DIAGNOSIS — G62.0 DRUG-INDUCED POLYNEUROPATHY: ICD-10-CM

## 2025-07-24 DIAGNOSIS — C77.3 MALIGNANT NEOPLASM OF UNSPECIFIED SITE OF LEFT FEMALE BREAST: ICD-10-CM

## 2025-07-24 DIAGNOSIS — S92.511A DISPLACED FRACTURE OF PROXIMAL PHALANX OF RIGHT LESSER TOE(S), INITIAL ENCOUNTER FOR CLOSED FRACTURE: ICD-10-CM

## 2025-07-24 DIAGNOSIS — F41.9 ANXIETY DISORDER, UNSPECIFIED: ICD-10-CM

## 2025-07-24 DIAGNOSIS — T45.1X5A DRUG-INDUCED POLYNEUROPATHY: ICD-10-CM

## 2025-07-24 DIAGNOSIS — C50.919 MALIGNANT NEOPLASM OF UNSPECIFIED SITE OF UNSPECIFIED FEMALE BREAST: ICD-10-CM

## 2025-07-24 PROCEDURE — 99213 OFFICE O/P EST LOW 20 MIN: CPT

## 2025-07-31 ENCOUNTER — APPOINTMENT (OUTPATIENT)
Dept: PSYCHIATRY | Facility: CLINIC | Age: 55
End: 2025-07-31

## 2025-08-18 ENCOUNTER — APPOINTMENT (OUTPATIENT)
Dept: PLASTIC SURGERY | Facility: CLINIC | Age: 55
End: 2025-08-18
Payer: MEDICAID

## 2025-08-18 DIAGNOSIS — N65.0 DEFORMITY OF RECONSTRUCTED BREAST: ICD-10-CM

## 2025-08-18 PROCEDURE — 99214 OFFICE O/P EST MOD 30 MIN: CPT

## 2025-09-10 ENCOUNTER — NON-APPOINTMENT (OUTPATIENT)
Age: 55
End: 2025-09-10

## 2025-09-11 ENCOUNTER — NON-APPOINTMENT (OUTPATIENT)
Age: 55
End: 2025-09-11

## 2025-09-15 RX ORDER — OXYCODONE AND ACETAMINOPHEN 5; 325 MG/1; MG/1
5-325 TABLET ORAL EVERY 6 HOURS
Qty: 12 | Refills: 0 | Status: ACTIVE | COMMUNITY
Start: 2025-09-15 | End: 1900-01-01

## 2025-09-19 ENCOUNTER — TRANSCRIPTION ENCOUNTER (OUTPATIENT)
Age: 55
End: 2025-09-19

## 2025-09-19 ENCOUNTER — NON-APPOINTMENT (OUTPATIENT)
Age: 55
End: 2025-09-19

## (undated) DEVICE — DRAPE TOP SHEET 53" X 101"

## (undated) DEVICE — DRSG KERLIX ROLL 4.5"

## (undated) DEVICE — WARMING BLANKET UPPER ADULT

## (undated) DEVICE — PACK BREAST RECONSTRUCTION

## (undated) DEVICE — SHOE  POSTOP SOFTIE DARCO M-SM

## (undated) DEVICE — SUT SILK 3-0 30" SH

## (undated) DEVICE — SOL IRR POUR NS 0.9% 500ML

## (undated) DEVICE — Device

## (undated) DEVICE — SPONGE SURGICAL STRIP 1" X 6"

## (undated) DEVICE — NDL HYPO SAFE 25G X 1.5" (ORANGE)

## (undated) DEVICE — MARKING PEN W RULER

## (undated) DEVICE — DRSG DERMABOND PRINEO 22CM

## (undated) DEVICE — DRAIN RESERVOIR FOR JACKSON PRATT 100CC CARDINAL

## (undated) DEVICE — DRAPE TOWEL BLUE 17" X 24"

## (undated) DEVICE — CANNULA MICROAIRE FLARED MERCEDES 4MM 30CM

## (undated) DEVICE — DRSG XEROFORM 1 X 8"

## (undated) DEVICE — DRAPE MAYO STAND 23"

## (undated) DEVICE — DRAPE MAYO STAND 30"

## (undated) DEVICE — TUBING MICROAIRE ASPIRATION SET 12FT

## (undated) DEVICE — DRSG KLING 3"

## (undated) DEVICE — GLV 8 PROTEXIS (WHITE)

## (undated) DEVICE — BAG SPONGE COUNTER EZ

## (undated) DEVICE — SUT VICRYL 2-0 27" CT-1 UNDYED

## (undated) DEVICE — PREP BETADINE SPONGE STICKS

## (undated) DEVICE — BLADE SURGICAL #10 CARBON

## (undated) DEVICE — BLADE SURGICAL #15 CARBON

## (undated) DEVICE — FUNNEL STRL 6 IND PK

## (undated) DEVICE — SUT SILK 2-0 30" PSL

## (undated) DEVICE — DRAPE LIGHT HANDLE COVER (BLUE)

## (undated) DEVICE — BLADE SCALPEL SAFETY #15 WITH PLASTIC GREEN HANDLE

## (undated) DEVICE — CLAMP DBL VENOUS SM 20G/MM2

## (undated) DEVICE — SLV COMPRESSION KNEE MED

## (undated) DEVICE — DRAIN JACKSON PRATT 15FR ROUND END W TROCAR

## (undated) DEVICE — SUT MONOCRYL 3-0 18" PS-1

## (undated) DEVICE — DRSG DERMABOND PRINEO 60CM

## (undated) DEVICE — SUT STRATAFIX SPIRAL MONOCRYL PLUS 4-0 14CM PS-2 UNDYED

## (undated) DEVICE — SAW BLADE STRYKER MED AGGRESSIVE 9X25X0.38MM

## (undated) DEVICE — PREP CHLORAPREP HI-LITE ORANGE 26ML

## (undated) DEVICE — SYR LUER LOK 30CC

## (undated) DEVICE — FRAZIER SUCTION TIP 10FR

## (undated) DEVICE — ELCTR PROBE GAMMA TRUNODE

## (undated) DEVICE — DRAPE 1/2 SHEET 40X57"

## (undated) DEVICE — WARMING BLANKET FULL UNDERBODY

## (undated) DEVICE — DRAPE SURGICAL #1010

## (undated) DEVICE — DRSG XEROFORM 5 X 9"

## (undated) DEVICE — CATARACT KIT

## (undated) DEVICE — SUT POLYSORB 3-0 18" P-12 UNDYED

## (undated) DEVICE — SYR ASEPTO

## (undated) DEVICE — GEL AQUSNC PACKET 20GR

## (undated) DEVICE — INVUITY ILLUMINATOR EIGR WAVEGUIDE, WIDE/FLAT DISP

## (undated) DEVICE — SUT PROLENE 4-0 18" PS-2

## (undated) DEVICE — BIPOLAR FORCEP KIRWAN JEWELERS STR 4" X 0.4MM W 12FT CORD (GREEN)

## (undated) DEVICE — DRSG BREAST BINDER PINK FLORAL MED

## (undated) DEVICE — SYR LUER LOK 5CC

## (undated) DEVICE — SUT PROLENE 2-0 36" SH

## (undated) DEVICE — DRSG COMBINE 5X9"

## (undated) DEVICE — CATH URETH SELF PED 8FR

## (undated) DEVICE — PACK MINOR

## (undated) DEVICE — SUT VICRYL 3-0 27" CT-1

## (undated) DEVICE — DRAPE PROBE COVER 5" X 96"

## (undated) DEVICE — SUT MONOCRYL 5-0 18" P-3 UNDYED

## (undated) DEVICE — SAW BLADE STRYKER MED NARROW 18X5.5MM

## (undated) DEVICE — SUT VICRYL 4-0 27" SH

## (undated) DEVICE — MERCIAN VISABILITY BACKROUND GREEN

## (undated) DEVICE — NDL HYPO SAFE 18G X 1.5" (PINK)

## (undated) DEVICE — TOURNIQUET CUFF 18" DUAL PORT DUAL BLADDER W PLC (BLACK)

## (undated) DEVICE — SUT VICRYL 2-0 27" SH UNDYED

## (undated) DEVICE — VENODYNE/SCD SLEEVE CALF MEDIUM

## (undated) DEVICE — BLADE PHOTON 7.5IN / 10.5IN

## (undated) DEVICE — DRSG ACE BANDAGE 3"

## (undated) DEVICE — DRAPE INSTRUMENT POUCH 6.75" X 11"

## (undated) DEVICE — ELCTR PENCIL SMOKE EVACUATOR COATED PUSH BUTTON 70MM

## (undated) DEVICE — DRSG TELFA 3 X 8

## (undated) DEVICE — DRAPE SPLIT SHEET 77" X 108"

## (undated) DEVICE — DRSG DERMABOND 0.7ML

## (undated) DEVICE — BUR STRYKER EGG 4MM

## (undated) DEVICE — SUT MONOSOF 4-0 18" C-13

## (undated) DEVICE — TONGUE DEPRESSOR

## (undated) DEVICE — DRSG STERISTRIPS 0.5 X 4"

## (undated) DEVICE — NDL NERVE STIM 22GA X 2IN 30 DEG

## (undated) DEVICE — WARMING BLANKET LOWER ADULT

## (undated) DEVICE — LAP PAD 18 X 18"

## (undated) DEVICE — GLV 7 PROTEXIS (WHITE)

## (undated) DEVICE — BLADE SCALPEL SAFETYLOCK #15

## (undated) DEVICE — GLV 8 PROTEXIS (CREAM) MICRO

## (undated) DEVICE — SYS RESOLVE FAT PROCESSING 1 UNIT

## (undated) DEVICE — ELCTR BOVIE TIP BLADE MEGADYNE E-Z CLEAN 4" EXTENDED

## (undated) DEVICE — CANNULA MICROAIRE FLARED MERCEDES 5MM 30CM

## (undated) DEVICE — ONETRAC LIGHTED RETRACTOR 135 X 30MM DISP

## (undated) DEVICE — DOPPLER PROBE  CABLE

## (undated) DEVICE — POSITIONER FOAM EGG CRATE ULNAR 2PCS (PINK)

## (undated) DEVICE — ELCTR BOVIE TIP NEEDLE INSULATED 2.8" EDGE

## (undated) DEVICE — ELCTR BOVIE TIP BLADE INSULATED 2.75" EDGE

## (undated) DEVICE — SUT PLAIN GUT FAST ABSORBING 5-0 PC-1

## (undated) DEVICE — CLAMP MICROVASCULAR SINGLE  1-2MM

## (undated) DEVICE — STAPLER SKIN PROXIMATE

## (undated) DEVICE — TOURNIQUET ESMARK 4"

## (undated) DEVICE — SYR TOOMEY 50ML

## (undated) DEVICE — GLV 6.5 PROTEXIS (WHITE)

## (undated) DEVICE — SUT STRATAFIX SPIRAL MONOCRYL PLUS 3-0 30CM PS-2 UNDYED

## (undated) DEVICE — DRSG STOCKINETTE CLOTH 6 X 72"

## (undated) DEVICE — SUT NYLON 9-0 5" DRM5

## (undated) DEVICE — ELCTR BOVIE PENCIL HANDPIECE ROCKER SWITCH 15FT

## (undated) DEVICE — SUCTION YANKAUER BULBOUS TIP W VENT

## (undated) DEVICE — SUT NYLON 9-0 5" HSV6

## (undated) DEVICE — DRSG TEGADERM 4X4.75"

## (undated) DEVICE — DRSG CURITY GAUZE SPONGE 4 X 4" 12-PLY

## (undated) DEVICE — SUT NYLON 8-0 5" DRM6

## (undated) DEVICE — SPEAR SURG EYE WECK-CELL CELOS

## (undated) DEVICE — SHOE  POSTOP SOFTIE DARCO M-M

## (undated) DEVICE — SYR LUER LOK 10CC

## (undated) DEVICE — SUT POLYSORB 2-0 30" V-20 UNDYED

## (undated) DEVICE — FOLEY TRAY 16FR 5CC LF UMETER CLOSED

## (undated) DEVICE — SHOE  POSTOP SOFTIE DARCO M-LG

## (undated) DEVICE — SOL ANTI FOG

## (undated) DEVICE — CANISTER SPECIMEN CONVERTOR PLASTIC

## (undated) DEVICE — SOL IRR POUR H2O 500ML

## (undated) DEVICE — SPECIMEN CONTAINER 100ML

## (undated) DEVICE — PACK UPPER BODY

## (undated) DEVICE — SUT POLYSORB 3-0 30" V-20 UNDYED

## (undated) DEVICE — LONE STAR ELASTIC STAY HOOK 12MM BLUNT

## (undated) DEVICE — SUT MONOCRYL 3-0 27" PS-2 UNDYED

## (undated) DEVICE — CANNULA ANT CHMBR 27GX22MM

## (undated) DEVICE — ELCTR STRYKER BLADE COATED INSULATED 165MM

## (undated) DEVICE — SYR LUER LOK 3CC

## (undated) DEVICE — GLV 7.5 PROTEXIS (WHITE)

## (undated) DEVICE — SUT MONOCRYL 4-0 18" P-3 UNDYED

## (undated) DEVICE — ELCTR STRYKER NEPTUNE BLADE COATED, INSULATED 70MM

## (undated) DEVICE — DRSG WEBRIL 4"

## (undated) DEVICE — SUT VICRYL 2-0 27" CT-1